# Patient Record
Sex: MALE | Race: WHITE | NOT HISPANIC OR LATINO | Employment: OTHER | URBAN - METROPOLITAN AREA
[De-identification: names, ages, dates, MRNs, and addresses within clinical notes are randomized per-mention and may not be internally consistent; named-entity substitution may affect disease eponyms.]

---

## 2017-06-07 ENCOUNTER — TRANSCRIBE ORDERS (OUTPATIENT)
Dept: ADMINISTRATIVE | Age: 67
End: 2017-06-07

## 2017-06-07 ENCOUNTER — APPOINTMENT (OUTPATIENT)
Dept: LAB | Age: 67
End: 2017-06-07
Payer: MEDICARE

## 2017-06-07 ENCOUNTER — ALLSCRIPTS OFFICE VISIT (OUTPATIENT)
Dept: OTHER | Facility: OTHER | Age: 67
End: 2017-06-07

## 2017-06-07 DIAGNOSIS — Z12.5 ENCOUNTER FOR SCREENING FOR MALIGNANT NEOPLASM OF PROSTATE: ICD-10-CM

## 2017-06-07 DIAGNOSIS — N39.0 URINARY TRACT INFECTION: ICD-10-CM

## 2017-06-07 LAB
BILIRUB UR QL STRIP: NEGATIVE
CLARITY UR: CLEAR
COLOR UR: YELLOW
GLUCOSE UR STRIP-MCNC: ABNORMAL MG/DL
HGB UR QL STRIP.AUTO: NEGATIVE
KETONES UR STRIP-MCNC: NEGATIVE MG/DL
LEUKOCYTE ESTERASE UR QL STRIP: NEGATIVE
NITRITE UR QL STRIP: NEGATIVE
PH UR STRIP.AUTO: 6 [PH] (ref 4.5–8)
PROT UR STRIP-MCNC: NEGATIVE MG/DL
PSA SERPL-MCNC: 0.4 NG/ML (ref 0–4)
SP GR UR STRIP.AUTO: 1.03 (ref 1–1.03)
UROBILINOGEN UR QL STRIP.AUTO: 0.2 E.U./DL

## 2017-06-07 PROCEDURE — 81003 URINALYSIS AUTO W/O SCOPE: CPT

## 2017-06-07 PROCEDURE — G0103 PSA SCREENING: HCPCS

## 2017-06-07 PROCEDURE — 36415 COLL VENOUS BLD VENIPUNCTURE: CPT

## 2017-06-07 PROCEDURE — 87086 URINE CULTURE/COLONY COUNT: CPT

## 2017-06-08 LAB — BACTERIA UR CULT: NORMAL

## 2017-06-20 ENCOUNTER — TRANSCRIBE ORDERS (OUTPATIENT)
Dept: ADMINISTRATIVE | Age: 67
End: 2017-06-20

## 2017-06-20 ENCOUNTER — APPOINTMENT (OUTPATIENT)
Dept: LAB | Age: 67
End: 2017-06-20
Payer: MEDICARE

## 2017-06-20 DIAGNOSIS — I10 ESSENTIAL HYPERTENSION, MALIGNANT: ICD-10-CM

## 2017-06-20 DIAGNOSIS — E08.8 DIABETES MELLITUS DUE TO UNDERLYING CONDITION WITH COMPLICATION, UNSPECIFIED LONG TERM INSULIN USE STATUS: ICD-10-CM

## 2017-06-20 DIAGNOSIS — R53.83 FATIGUE, UNSPECIFIED TYPE: Primary | ICD-10-CM

## 2017-06-20 DIAGNOSIS — R53.83 FATIGUE, UNSPECIFIED TYPE: ICD-10-CM

## 2017-06-20 LAB
ALBUMIN SERPL BCP-MCNC: 4.1 G/DL (ref 3.5–5)
ALP SERPL-CCNC: 57 U/L (ref 46–116)
ALT SERPL W P-5'-P-CCNC: 29 U/L (ref 12–78)
ANION GAP SERPL CALCULATED.3IONS-SCNC: 6 MMOL/L (ref 4–13)
AST SERPL W P-5'-P-CCNC: 21 U/L (ref 5–45)
BASOPHILS # BLD AUTO: 0.01 THOUSANDS/ΜL (ref 0–0.1)
BASOPHILS NFR BLD AUTO: 0 % (ref 0–1)
BILIRUB SERPL-MCNC: 0.66 MG/DL (ref 0.2–1)
BILIRUB UR QL STRIP: NEGATIVE
BUN SERPL-MCNC: 19 MG/DL (ref 5–25)
CALCIUM SERPL-MCNC: 9.2 MG/DL (ref 8.3–10.1)
CHLORIDE SERPL-SCNC: 109 MMOL/L (ref 100–108)
CHOLEST SERPL-MCNC: 138 MG/DL (ref 50–200)
CLARITY UR: CLEAR
CO2 SERPL-SCNC: 27 MMOL/L (ref 21–32)
COLOR UR: YELLOW
CREAT SERPL-MCNC: 1.25 MG/DL (ref 0.6–1.3)
CREAT UR-MCNC: 197 MG/DL
EOSINOPHIL # BLD AUTO: 0.17 THOUSAND/ΜL (ref 0–0.61)
EOSINOPHIL NFR BLD AUTO: 3 % (ref 0–6)
ERYTHROCYTE [DISTWIDTH] IN BLOOD BY AUTOMATED COUNT: 13 % (ref 11.6–15.1)
EST. AVERAGE GLUCOSE BLD GHB EST-MCNC: 120 MG/DL
GFR SERPL CREATININE-BSD FRML MDRD: 57.6 ML/MIN/1.73SQ M
GLUCOSE P FAST SERPL-MCNC: 133 MG/DL (ref 65–99)
GLUCOSE UR STRIP-MCNC: ABNORMAL MG/DL
HBA1C MFR BLD: 5.8 % (ref 4.2–6.3)
HCT VFR BLD AUTO: 43.4 % (ref 36.5–49.3)
HDLC SERPL-MCNC: 30 MG/DL (ref 40–60)
HGB BLD-MCNC: 15.2 G/DL (ref 12–17)
HGB UR QL STRIP.AUTO: NEGATIVE
KETONES UR STRIP-MCNC: NEGATIVE MG/DL
LDLC SERPL CALC-MCNC: 80 MG/DL (ref 0–100)
LEUKOCYTE ESTERASE UR QL STRIP: NEGATIVE
LYMPHOCYTES # BLD AUTO: 1.75 THOUSANDS/ΜL (ref 0.6–4.47)
LYMPHOCYTES NFR BLD AUTO: 30 % (ref 14–44)
MCH RBC QN AUTO: 34.4 PG (ref 26.8–34.3)
MCHC RBC AUTO-ENTMCNC: 35 G/DL (ref 31.4–37.4)
MCV RBC AUTO: 98 FL (ref 82–98)
MICROALBUMIN UR-MCNC: 17.4 MG/L (ref 0–20)
MICROALBUMIN/CREAT 24H UR: 9 MG/G CREATININE (ref 0–30)
MONOCYTES # BLD AUTO: 0.44 THOUSAND/ΜL (ref 0.17–1.22)
MONOCYTES NFR BLD AUTO: 8 % (ref 4–12)
NEUTROPHILS # BLD AUTO: 3.35 THOUSANDS/ΜL (ref 1.85–7.62)
NEUTS SEG NFR BLD AUTO: 59 % (ref 43–75)
NITRITE UR QL STRIP: NEGATIVE
NRBC BLD AUTO-RTO: 0 /100 WBCS
PH UR STRIP.AUTO: 6.5 [PH] (ref 4.5–8)
PLATELET # BLD AUTO: 199 THOUSANDS/UL (ref 149–390)
PMV BLD AUTO: 11.4 FL (ref 8.9–12.7)
POTASSIUM SERPL-SCNC: 4.4 MMOL/L (ref 3.5–5.3)
PROT SERPL-MCNC: 7.1 G/DL (ref 6.4–8.2)
PROT UR STRIP-MCNC: NEGATIVE MG/DL
RBC # BLD AUTO: 4.42 MILLION/UL (ref 3.88–5.62)
SODIUM SERPL-SCNC: 142 MMOL/L (ref 136–145)
SP GR UR STRIP.AUTO: 1.03 (ref 1–1.03)
T3 SERPL-MCNC: 0.8 NG/ML (ref 0.6–1.8)
T4 FREE SERPL-MCNC: 0.91 NG/DL (ref 0.76–1.46)
TRIGL SERPL-MCNC: 142 MG/DL
TSH SERPL DL<=0.05 MIU/L-ACNC: 2.9 UIU/ML (ref 0.36–3.74)
UROBILINOGEN UR QL STRIP.AUTO: 1 E.U./DL
WBC # BLD AUTO: 5.77 THOUSAND/UL (ref 4.31–10.16)

## 2017-06-20 PROCEDURE — 80053 COMPREHEN METABOLIC PANEL: CPT

## 2017-06-20 PROCEDURE — 82043 UR ALBUMIN QUANTITATIVE: CPT | Performed by: INTERNAL MEDICINE

## 2017-06-20 PROCEDURE — 84439 ASSAY OF FREE THYROXINE: CPT

## 2017-06-20 PROCEDURE — 83036 HEMOGLOBIN GLYCOSYLATED A1C: CPT

## 2017-06-20 PROCEDURE — 82570 ASSAY OF URINE CREATININE: CPT | Performed by: INTERNAL MEDICINE

## 2017-06-20 PROCEDURE — 36415 COLL VENOUS BLD VENIPUNCTURE: CPT

## 2017-06-20 PROCEDURE — 85025 COMPLETE CBC W/AUTO DIFF WBC: CPT

## 2017-06-20 PROCEDURE — 84443 ASSAY THYROID STIM HORMONE: CPT

## 2017-06-20 PROCEDURE — 80061 LIPID PANEL: CPT

## 2017-06-20 PROCEDURE — 81003 URINALYSIS AUTO W/O SCOPE: CPT | Performed by: INTERNAL MEDICINE

## 2017-06-20 PROCEDURE — 84480 ASSAY TRIIODOTHYRONINE (T3): CPT

## 2017-07-21 ENCOUNTER — TRANSCRIBE ORDERS (OUTPATIENT)
Dept: ADMINISTRATIVE | Age: 67
End: 2017-07-21

## 2017-07-21 ENCOUNTER — APPOINTMENT (OUTPATIENT)
Dept: LAB | Age: 67
End: 2017-07-21
Payer: MEDICARE

## 2017-07-21 DIAGNOSIS — A69.20 LYME DISEASE, UNSPECIFIED: Primary | ICD-10-CM

## 2017-07-21 DIAGNOSIS — A69.20 LYME DISEASE, UNSPECIFIED: ICD-10-CM

## 2017-07-21 PROCEDURE — 36415 COLL VENOUS BLD VENIPUNCTURE: CPT

## 2017-07-21 PROCEDURE — 87476 LYME DIS DNA AMP PROBE: CPT

## 2017-07-25 LAB — B BURGDOR DNA SPEC QL NAA+PROBE: NEGATIVE

## 2017-10-17 ENCOUNTER — TRANSCRIBE ORDERS (OUTPATIENT)
Dept: ADMINISTRATIVE | Age: 67
End: 2017-10-17

## 2017-10-17 ENCOUNTER — APPOINTMENT (OUTPATIENT)
Dept: LAB | Age: 67
End: 2017-10-17
Payer: MEDICARE

## 2017-10-17 DIAGNOSIS — E13.8 OTHER SPECIFIED DIABETES MELLITUS WITH COMPLICATION, WITHOUT LONG-TERM CURRENT USE OF INSULIN: ICD-10-CM

## 2017-10-17 DIAGNOSIS — E78.49 FAMILIAL COMBINED HYPERLIPIDEMIA: ICD-10-CM

## 2017-10-17 DIAGNOSIS — E13.8 OTHER SPECIFIED DIABETES MELLITUS WITH COMPLICATION, WITHOUT LONG-TERM CURRENT USE OF INSULIN: Primary | ICD-10-CM

## 2017-10-17 LAB
ALBUMIN SERPL BCP-MCNC: 3.8 G/DL (ref 3.5–5)
ALP SERPL-CCNC: 49 U/L (ref 46–116)
ALT SERPL W P-5'-P-CCNC: 31 U/L (ref 12–78)
ANION GAP SERPL CALCULATED.3IONS-SCNC: 7 MMOL/L (ref 4–13)
AST SERPL W P-5'-P-CCNC: 23 U/L (ref 5–45)
BILIRUB SERPL-MCNC: 0.72 MG/DL (ref 0.2–1)
BUN SERPL-MCNC: 23 MG/DL (ref 5–25)
CALCIUM SERPL-MCNC: 9.1 MG/DL (ref 8.3–10.1)
CHLORIDE SERPL-SCNC: 107 MMOL/L (ref 100–108)
CO2 SERPL-SCNC: 25 MMOL/L (ref 21–32)
CREAT SERPL-MCNC: 1.28 MG/DL (ref 0.6–1.3)
EST. AVERAGE GLUCOSE BLD GHB EST-MCNC: 140 MG/DL
GFR SERPL CREATININE-BSD FRML MDRD: 57 ML/MIN/1.73SQ M
GLUCOSE P FAST SERPL-MCNC: 147 MG/DL (ref 65–99)
HBA1C MFR BLD: 6.5 % (ref 4.2–6.3)
POTASSIUM SERPL-SCNC: 4.7 MMOL/L (ref 3.5–5.3)
PROT SERPL-MCNC: 7.3 G/DL (ref 6.4–8.2)
SODIUM SERPL-SCNC: 139 MMOL/L (ref 136–145)
T3 SERPL-MCNC: 0.9 NG/ML (ref 0.6–1.8)
T4 FREE SERPL-MCNC: 0.92 NG/DL (ref 0.76–1.46)
TSH SERPL DL<=0.05 MIU/L-ACNC: 2.55 UIU/ML (ref 0.36–3.74)
URATE SERPL-MCNC: 3.8 MG/DL (ref 4.2–8)

## 2017-10-17 PROCEDURE — 84439 ASSAY OF FREE THYROXINE: CPT

## 2017-10-17 PROCEDURE — 84550 ASSAY OF BLOOD/URIC ACID: CPT

## 2017-10-17 PROCEDURE — 84480 ASSAY TRIIODOTHYRONINE (T3): CPT

## 2017-10-17 PROCEDURE — 80053 COMPREHEN METABOLIC PANEL: CPT

## 2017-10-17 PROCEDURE — 36415 COLL VENOUS BLD VENIPUNCTURE: CPT

## 2017-10-17 PROCEDURE — 83036 HEMOGLOBIN GLYCOSYLATED A1C: CPT

## 2017-10-17 PROCEDURE — 84443 ASSAY THYROID STIM HORMONE: CPT

## 2018-01-14 VITALS
SYSTOLIC BLOOD PRESSURE: 122 MMHG | HEART RATE: 62 BPM | WEIGHT: 178 LBS | DIASTOLIC BLOOD PRESSURE: 68 MMHG | BODY MASS INDEX: 29.66 KG/M2 | HEIGHT: 65 IN

## 2018-02-21 ENCOUNTER — APPOINTMENT (OUTPATIENT)
Dept: LAB | Age: 68
End: 2018-02-21
Payer: MEDICARE

## 2018-02-21 ENCOUNTER — TRANSCRIBE ORDERS (OUTPATIENT)
Dept: ADMINISTRATIVE | Age: 68
End: 2018-02-21

## 2018-02-21 DIAGNOSIS — T73.2XXA FATIGUE DUE TO EXPOSURE, INITIAL ENCOUNTER: ICD-10-CM

## 2018-02-21 DIAGNOSIS — E78.49 FAMILIAL COMBINED HYPERLIPIDEMIA: ICD-10-CM

## 2018-02-21 DIAGNOSIS — E13.8 DIABETES MELLITUS OF OTHER TYPE WITH COMPLICATION, UNSPECIFIED LONG TERM INSULIN USE STATUS: ICD-10-CM

## 2018-02-21 DIAGNOSIS — E13.8 DIABETES MELLITUS OF OTHER TYPE WITH COMPLICATION, UNSPECIFIED LONG TERM INSULIN USE STATUS: Primary | ICD-10-CM

## 2018-02-21 LAB
ALBUMIN SERPL BCP-MCNC: 3.8 G/DL (ref 3.5–5)
ALP SERPL-CCNC: 43 U/L (ref 46–116)
ALT SERPL W P-5'-P-CCNC: 31 U/L (ref 12–78)
ANION GAP SERPL CALCULATED.3IONS-SCNC: 7 MMOL/L (ref 4–13)
AST SERPL W P-5'-P-CCNC: 25 U/L (ref 5–45)
BASOPHILS # BLD AUTO: 0.01 THOUSANDS/ΜL (ref 0–0.1)
BASOPHILS NFR BLD AUTO: 0 % (ref 0–1)
BILIRUB SERPL-MCNC: 0.81 MG/DL (ref 0.2–1)
BUN SERPL-MCNC: 19 MG/DL (ref 5–25)
CALCIUM SERPL-MCNC: 8.7 MG/DL (ref 8.3–10.1)
CHLORIDE SERPL-SCNC: 107 MMOL/L (ref 100–108)
CHOLEST SERPL-MCNC: 111 MG/DL (ref 50–200)
CO2 SERPL-SCNC: 27 MMOL/L (ref 21–32)
CREAT SERPL-MCNC: 1.32 MG/DL (ref 0.6–1.3)
EOSINOPHIL # BLD AUTO: 0.17 THOUSAND/ΜL (ref 0–0.61)
EOSINOPHIL NFR BLD AUTO: 3 % (ref 0–6)
ERYTHROCYTE [DISTWIDTH] IN BLOOD BY AUTOMATED COUNT: 12.5 % (ref 11.6–15.1)
EST. AVERAGE GLUCOSE BLD GHB EST-MCNC: 137 MG/DL
GFR SERPL CREATININE-BSD FRML MDRD: 55 ML/MIN/1.73SQ M
GLUCOSE P FAST SERPL-MCNC: 140 MG/DL (ref 65–99)
HBA1C MFR BLD: 6.4 % (ref 4.2–6.3)
HCT VFR BLD AUTO: 40.9 % (ref 36.5–49.3)
HDLC SERPL-MCNC: 28 MG/DL (ref 40–60)
HGB BLD-MCNC: 14.3 G/DL (ref 12–17)
LDLC SERPL CALC-MCNC: 55 MG/DL (ref 0–100)
LYMPHOCYTES # BLD AUTO: 1.53 THOUSANDS/ΜL (ref 0.6–4.47)
LYMPHOCYTES NFR BLD AUTO: 28 % (ref 14–44)
MCH RBC QN AUTO: 34.2 PG (ref 26.8–34.3)
MCHC RBC AUTO-ENTMCNC: 35 G/DL (ref 31.4–37.4)
MCV RBC AUTO: 98 FL (ref 82–98)
MONOCYTES # BLD AUTO: 0.45 THOUSAND/ΜL (ref 0.17–1.22)
MONOCYTES NFR BLD AUTO: 8 % (ref 4–12)
NEUTROPHILS # BLD AUTO: 3.16 THOUSANDS/ΜL (ref 1.85–7.62)
NEUTS SEG NFR BLD AUTO: 61 % (ref 43–75)
NRBC BLD AUTO-RTO: 0 /100 WBCS
PLATELET # BLD AUTO: 167 THOUSANDS/UL (ref 149–390)
PMV BLD AUTO: 11 FL (ref 8.9–12.7)
POTASSIUM SERPL-SCNC: 4.3 MMOL/L (ref 3.5–5.3)
PROT SERPL-MCNC: 6.7 G/DL (ref 6.4–8.2)
RBC # BLD AUTO: 4.18 MILLION/UL (ref 3.88–5.62)
SODIUM SERPL-SCNC: 141 MMOL/L (ref 136–145)
T3 SERPL-MCNC: 0.9 NG/ML (ref 0.6–1.8)
T4 FREE SERPL-MCNC: 0.87 NG/DL (ref 0.76–1.46)
TRIGL SERPL-MCNC: 138 MG/DL
TSH SERPL DL<=0.05 MIU/L-ACNC: 3.59 UIU/ML (ref 0.36–3.74)
WBC # BLD AUTO: 5.38 THOUSAND/UL (ref 4.31–10.16)

## 2018-02-21 PROCEDURE — 84480 ASSAY TRIIODOTHYRONINE (T3): CPT

## 2018-02-21 PROCEDURE — 80053 COMPREHEN METABOLIC PANEL: CPT

## 2018-02-21 PROCEDURE — 84439 ASSAY OF FREE THYROXINE: CPT

## 2018-02-21 PROCEDURE — 36415 COLL VENOUS BLD VENIPUNCTURE: CPT

## 2018-02-21 PROCEDURE — 84443 ASSAY THYROID STIM HORMONE: CPT

## 2018-02-21 PROCEDURE — 83036 HEMOGLOBIN GLYCOSYLATED A1C: CPT

## 2018-02-21 PROCEDURE — 85025 COMPLETE CBC W/AUTO DIFF WBC: CPT

## 2018-02-21 PROCEDURE — 80061 LIPID PANEL: CPT

## 2018-06-07 DIAGNOSIS — Z12.5 ENCOUNTER FOR SCREENING FOR MALIGNANT NEOPLASM OF PROSTATE: ICD-10-CM

## 2018-06-25 ENCOUNTER — TRANSCRIBE ORDERS (OUTPATIENT)
Dept: ADMINISTRATIVE | Age: 68
End: 2018-06-25

## 2018-06-25 ENCOUNTER — APPOINTMENT (OUTPATIENT)
Dept: LAB | Age: 68
End: 2018-06-25
Payer: MEDICARE

## 2018-06-25 DIAGNOSIS — R53.83 TIREDNESS: ICD-10-CM

## 2018-06-25 DIAGNOSIS — E11.9 DIABETES MELLITUS, STABLE (HCC): ICD-10-CM

## 2018-06-25 DIAGNOSIS — E11.9 DIABETES MELLITUS, STABLE (HCC): Primary | ICD-10-CM

## 2018-06-25 DIAGNOSIS — I10 HYPERTENSION, ESSENTIAL: ICD-10-CM

## 2018-06-25 LAB
ALBUMIN SERPL BCP-MCNC: 3.9 G/DL (ref 3.5–5)
ALP SERPL-CCNC: 48 U/L (ref 46–116)
ALT SERPL W P-5'-P-CCNC: 31 U/L (ref 12–78)
ANION GAP SERPL CALCULATED.3IONS-SCNC: 8 MMOL/L (ref 4–13)
AST SERPL W P-5'-P-CCNC: 25 U/L (ref 5–45)
BASOPHILS # BLD AUTO: 0.03 THOUSANDS/ΜL (ref 0–0.1)
BASOPHILS NFR BLD AUTO: 1 % (ref 0–1)
BILIRUB SERPL-MCNC: 0.66 MG/DL (ref 0.2–1)
BUN SERPL-MCNC: 20 MG/DL (ref 5–25)
CALCIUM SERPL-MCNC: 8.9 MG/DL (ref 8.3–10.1)
CHLORIDE SERPL-SCNC: 107 MMOL/L (ref 100–108)
CHOLEST SERPL-MCNC: 110 MG/DL (ref 50–200)
CO2 SERPL-SCNC: 25 MMOL/L (ref 21–32)
CREAT SERPL-MCNC: 1.22 MG/DL (ref 0.6–1.3)
CREAT UR-MCNC: 152 MG/DL
EOSINOPHIL # BLD AUTO: 0.16 THOUSAND/ΜL (ref 0–0.61)
EOSINOPHIL NFR BLD AUTO: 3 % (ref 0–6)
ERYTHROCYTE [DISTWIDTH] IN BLOOD BY AUTOMATED COUNT: 12 % (ref 11.6–15.1)
EST. AVERAGE GLUCOSE BLD GHB EST-MCNC: 134 MG/DL
GFR SERPL CREATININE-BSD FRML MDRD: 61 ML/MIN/1.73SQ M
GLUCOSE P FAST SERPL-MCNC: 148 MG/DL (ref 65–99)
HBA1C MFR BLD: 6.3 % (ref 4.2–6.3)
HCT VFR BLD AUTO: 41.8 % (ref 36.5–49.3)
HDLC SERPL-MCNC: 29 MG/DL (ref 40–60)
HGB BLD-MCNC: 14.5 G/DL (ref 12–17)
IMM GRANULOCYTES # BLD AUTO: 0.05 THOUSAND/UL (ref 0–0.2)
IMM GRANULOCYTES NFR BLD AUTO: 1 % (ref 0–2)
LDLC SERPL CALC-MCNC: 47 MG/DL (ref 0–100)
LDLC SERPL DIRECT ASSAY-MCNC: 69 MG/DL (ref 0–100)
LYMPHOCYTES # BLD AUTO: 1.43 THOUSANDS/ΜL (ref 0.6–4.47)
LYMPHOCYTES NFR BLD AUTO: 30 % (ref 14–44)
MCH RBC QN AUTO: 34.3 PG (ref 26.8–34.3)
MCHC RBC AUTO-ENTMCNC: 34.7 G/DL (ref 31.4–37.4)
MCV RBC AUTO: 99 FL (ref 82–98)
MICROALBUMIN UR-MCNC: 11.4 MG/L (ref 0–20)
MICROALBUMIN/CREAT 24H UR: 8 MG/G CREATININE (ref 0–30)
MONOCYTES # BLD AUTO: 0.4 THOUSAND/ΜL (ref 0.17–1.22)
MONOCYTES NFR BLD AUTO: 9 % (ref 4–12)
NEUTROPHILS # BLD AUTO: 2.63 THOUSANDS/ΜL (ref 1.85–7.62)
NEUTS SEG NFR BLD AUTO: 56 % (ref 43–75)
NONHDLC SERPL-MCNC: 81 MG/DL
NRBC BLD AUTO-RTO: 0 /100 WBCS
PLATELET # BLD AUTO: 169 THOUSANDS/UL (ref 149–390)
PMV BLD AUTO: 11.3 FL (ref 8.9–12.7)
POTASSIUM SERPL-SCNC: 3.9 MMOL/L (ref 3.5–5.3)
PROT SERPL-MCNC: 6.9 G/DL (ref 6.4–8.2)
RBC # BLD AUTO: 4.23 MILLION/UL (ref 3.88–5.62)
SODIUM SERPL-SCNC: 140 MMOL/L (ref 136–145)
T3 SERPL-MCNC: 0.9 NG/ML (ref 0.6–1.8)
T4 FREE SERPL-MCNC: 0.89 NG/DL (ref 0.76–1.46)
T4 SERPL-MCNC: 7.5 UG/DL (ref 4.7–13.3)
TRIGL SERPL-MCNC: 169 MG/DL
TSH SERPL DL<=0.05 MIU/L-ACNC: 2.83 UIU/ML (ref 0.36–3.74)
WBC # BLD AUTO: 4.7 THOUSAND/UL (ref 4.31–10.16)

## 2018-06-25 PROCEDURE — 84439 ASSAY OF FREE THYROXINE: CPT

## 2018-06-25 PROCEDURE — 83036 HEMOGLOBIN GLYCOSYLATED A1C: CPT

## 2018-06-25 PROCEDURE — 80061 LIPID PANEL: CPT

## 2018-06-25 PROCEDURE — 85025 COMPLETE CBC W/AUTO DIFF WBC: CPT

## 2018-06-25 PROCEDURE — 82570 ASSAY OF URINE CREATININE: CPT | Performed by: INTERNAL MEDICINE

## 2018-06-25 PROCEDURE — 80053 COMPREHEN METABOLIC PANEL: CPT

## 2018-06-25 PROCEDURE — 84443 ASSAY THYROID STIM HORMONE: CPT

## 2018-06-25 PROCEDURE — 82043 UR ALBUMIN QUANTITATIVE: CPT | Performed by: INTERNAL MEDICINE

## 2018-06-25 PROCEDURE — 36415 COLL VENOUS BLD VENIPUNCTURE: CPT

## 2018-06-25 PROCEDURE — 84480 ASSAY TRIIODOTHYRONINE (T3): CPT

## 2019-01-08 ENCOUNTER — TRANSCRIBE ORDERS (OUTPATIENT)
Dept: ADMINISTRATIVE | Facility: HOSPITAL | Age: 69
End: 2019-01-08

## 2019-01-08 DIAGNOSIS — G47.30 SLEEP APNEA, UNSPECIFIED TYPE: Primary | ICD-10-CM

## 2019-02-05 ENCOUNTER — OFFICE VISIT (OUTPATIENT)
Dept: SLEEP CENTER | Facility: CLINIC | Age: 69
End: 2019-02-05
Payer: MEDICARE

## 2019-02-05 VITALS
SYSTOLIC BLOOD PRESSURE: 120 MMHG | DIASTOLIC BLOOD PRESSURE: 80 MMHG | HEIGHT: 66 IN | HEART RATE: 56 BPM | WEIGHT: 184 LBS | BODY MASS INDEX: 29.57 KG/M2

## 2019-02-05 DIAGNOSIS — G47.30 SLEEP APNEA, UNSPECIFIED TYPE: ICD-10-CM

## 2019-02-05 PROCEDURE — 99204 OFFICE O/P NEW MOD 45 MIN: CPT | Performed by: INTERNAL MEDICINE

## 2019-02-05 RX ORDER — LISINOPRIL 5 MG/1
10 TABLET ORAL DAILY
COMMUNITY

## 2019-02-05 RX ORDER — METOPROLOL TARTRATE 50 MG/1
25 TABLET, FILM COATED ORAL EVERY 12 HOURS SCHEDULED
COMMUNITY

## 2019-02-05 RX ORDER — ATORVASTATIN CALCIUM 40 MG/1
40 TABLET, FILM COATED ORAL DAILY
COMMUNITY
End: 2022-05-13 | Stop reason: ALTCHOICE

## 2019-02-05 RX ORDER — RANOLAZINE 500 MG/1
500 TABLET, EXTENDED RELEASE ORAL 2 TIMES DAILY
COMMUNITY

## 2019-02-05 RX ORDER — FENOFIBRIC ACID 135 MG/1
CAPSULE, DELAYED RELEASE ORAL
COMMUNITY

## 2019-02-05 RX ORDER — GLIMEPIRIDE 2 MG/1
TABLET ORAL
Refills: 0 | COMMUNITY
Start: 2019-01-07

## 2019-02-05 RX ORDER — DEXLANSOPRAZOLE 60 MG/1
60 CAPSULE, DELAYED RELEASE ORAL DAILY
COMMUNITY

## 2019-02-05 NOTE — PROGRESS NOTES
Consultation - [address] Eisenberg : 1950  MRN: [id number]      Assessment:  The patient had testing performed on a SNAP device, which is inaccurate with regards to diagnosing sleep apnea and in particular identifying central sleep apnea  The patient was placed on CPAP of 7 cm, based on a nonvalidated calculation  It would be helpful to identify whether not the patient has central events and to identify the severity of his sleep apnea  At that point, the patient could be placed on either AutoPAP with a range of 4 to 20 cm or return for a titration study  The patient does have a reduction in ejection fraction due to a prior myocardial infarction (52%)  Plan:  Diagnostic polysomnography to determine the presence and severity of sleep apnea  I spoke with Jina Iglesias from [facility], who will provide the patient with an [facility], which I hope will not leak  Follow up: After testing    History of Present Illness:   70 y o male with a history of myocardial infarction and hypertension who underwent home sleep test (SNAP), which reported moderate obstructive sleep apnea with frequent central events  The patient also had overnight oximetry which showed frequent saw tooth desaturation  CPAP was calculated at a pressure of 7cm  The patient awakens with shortness of breath  He is having difficulty tolerating his CPAP, both because the pressure feels uncomfortable and the mask leaks  He has a history of asthma, but denies sustained shortness of breath at night  He also has a history of gastroesophageal reflux for which he is on Dexilant  His asthma is only problematic when he has an upper respiratory infection  He complains of difficulty breathing through his nose  He attributes that to a deviated septum        Review of Systems:      Genitourinary none   Cardiology none   Gastrointestinal frequent heartburn/acid reflux   Neurology need to move extremities, forgetfulness, poor concentration or confusion,  and difficulty with memory   Constitutional claustrophobia and fatigue   Integumentary none   Psychiatry none   Musculoskeletal joint pain   Pulmonary shortness of breath with activity and wheezing   ENT ringing in ears   Endocrine none   Hematological none         I have reviewed and updated the review of systems as necessary    Historical Information    Past Medical History:  Hypertension, gastroesophageal reflux, asthma, diabetes, hypercholesterolemia    Family History: Numerous family members have obstructive sleep apnea or snoring      Sleep Schedule: unremarkable    Snoring:    Yes      Witnessed Apnea:    No    Medications/Allergies:    Current Outpatient Prescriptions:     ASPIRIN 81 PO, Take by mouth, Disp: , Rfl:     atorvastatin (LIPITOR) 40 mg tablet, Take 40 mg by mouth daily, Disp: , Rfl:     Choline Fenofibrate (FENOFIBRIC ACID) 135 MG CPDR, Take by mouth, Disp: , Rfl:     dexlansoprazole (DEXILANT) 60 MG capsule, Take 60 mg by mouth daily, Disp: , Rfl:     glimepiride (AMARYL) 2 mg tablet, , Disp: , Rfl: 0    lisinopril (ZESTRIL) 5 mg tablet, Take 5 mg by mouth daily, Disp: , Rfl:     metoprolol tartrate (LOPRESSOR) 50 mg tablet, Take 25 mg by mouth every 12 (twelve) hours, Disp: , Rfl:     ranolazine (RANEXA) 500 mg 12 hr tablet, Take 500 mg by mouth 2 (two) times a day, Disp: , Rfl:     SITagliptin-MetFORMIN HCl ER (JANUMET XR) 100-1000 MG TB24, Take 1 tablet by mouth daily with dinner, Disp: , Rfl:         No notes on file                  Objective:    Vital Signs:   Vitals:    02/05/19 1200   BP: 120/80   Pulse: 56   Weight: 83 5 kg (184 lb)   Height: 5' 5 5" (1 664 m)     Neck Circumference: 40      [place] Sleepiness Scale:  Total score: 15    Physical Exam:    General: Alert, appropriate, cooperative, slightly overweight    Head: NC/AT, no retrognathia    Nose:  Moderate septal deviation, nares partially obstructed, mucosa normal    Throat: Airway lumen narrowed, tongue base thickened, no tonsils visualized    Neck: Normal, no thyromegaly or lymphadenopathy, no JVD    Heart: RR, normal S1 and S2, 2+ murmur left upper sternal border    Chest: Clear bilaterally    Extremity: No clubbing, cyanosis, trace edema    Skin: Warm, dry    Neuro: No motor abnormalities, cranial nerves appear intact    Sleep Study Results:   Await results of polysomnography  PAP Pressure: Currently set at 7 cm  DME Provider:  [address] [place] [address] / Coordination of Care  A description of the counseling / coordination of care: We discussed the pathophysiology of obstructive sleep apnea as well as the possible treatment options  We also discussed the rationale for positive airway pressure therapy              Board Certified Sleep Specialist

## 2019-02-06 ENCOUNTER — TRANSCRIBE ORDERS (OUTPATIENT)
Dept: ADMINISTRATIVE | Age: 69
End: 2019-02-06

## 2019-02-06 ENCOUNTER — APPOINTMENT (OUTPATIENT)
Dept: LAB | Age: 69
End: 2019-02-06
Payer: MEDICARE

## 2019-02-06 DIAGNOSIS — E13.8 DIABETES MELLITUS OF OTHER TYPE WITH COMPLICATION, UNSPECIFIED WHETHER LONG TERM INSULIN USE: ICD-10-CM

## 2019-02-06 DIAGNOSIS — R35.0 URINARY FREQUENCY: Primary | ICD-10-CM

## 2019-02-06 DIAGNOSIS — I10 ESSENTIAL HYPERTENSION, MALIGNANT: ICD-10-CM

## 2019-02-06 DIAGNOSIS — E78.41 ELEVATED LIPOPROTEIN A LEVEL: ICD-10-CM

## 2019-02-06 LAB
ALBUMIN SERPL BCP-MCNC: 3.9 G/DL (ref 3.5–5)
ALP SERPL-CCNC: 52 U/L (ref 46–116)
ALT SERPL W P-5'-P-CCNC: 29 U/L (ref 12–78)
ANION GAP SERPL CALCULATED.3IONS-SCNC: 6 MMOL/L (ref 4–13)
AST SERPL W P-5'-P-CCNC: 25 U/L (ref 5–45)
BACTERIA UR QL AUTO: ABNORMAL /HPF
BASOPHILS # BLD AUTO: 0.03 THOUSANDS/ΜL (ref 0–0.1)
BASOPHILS NFR BLD AUTO: 1 % (ref 0–1)
BILIRUB SERPL-MCNC: 0.74 MG/DL (ref 0.2–1)
BILIRUB UR QL STRIP: ABNORMAL
BUN SERPL-MCNC: 21 MG/DL (ref 5–25)
CALCIUM SERPL-MCNC: 8.9 MG/DL (ref 8.3–10.1)
CHLORIDE SERPL-SCNC: 109 MMOL/L (ref 100–108)
CHOLEST SERPL-MCNC: 109 MG/DL (ref 50–200)
CLARITY UR: CLEAR
CO2 SERPL-SCNC: 27 MMOL/L (ref 21–32)
COLOR UR: ABNORMAL
CREAT SERPL-MCNC: 1.3 MG/DL (ref 0.6–1.3)
CREAT UR-MCNC: 221 MG/DL
EOSINOPHIL # BLD AUTO: 0.16 THOUSAND/ΜL (ref 0–0.61)
EOSINOPHIL NFR BLD AUTO: 3 % (ref 0–6)
ERYTHROCYTE [DISTWIDTH] IN BLOOD BY AUTOMATED COUNT: 12.2 % (ref 11.6–15.1)
EST. AVERAGE GLUCOSE BLD GHB EST-MCNC: 143 MG/DL
GFR SERPL CREATININE-BSD FRML MDRD: 56 ML/MIN/1.73SQ M
GLUCOSE P FAST SERPL-MCNC: 139 MG/DL (ref 65–99)
GLUCOSE UR STRIP-MCNC: ABNORMAL MG/DL
HBA1C MFR BLD: 6.6 % (ref 4.2–6.3)
HCT VFR BLD AUTO: 43.1 % (ref 36.5–49.3)
HDLC SERPL-MCNC: 28 MG/DL (ref 40–60)
HGB BLD-MCNC: 14.6 G/DL (ref 12–17)
HGB UR QL STRIP.AUTO: NEGATIVE
HYALINE CASTS #/AREA URNS LPF: ABNORMAL /LPF
IMM GRANULOCYTES # BLD AUTO: 0.04 THOUSAND/UL (ref 0–0.2)
IMM GRANULOCYTES NFR BLD AUTO: 1 % (ref 0–2)
KETONES UR STRIP-MCNC: NEGATIVE MG/DL
LDLC SERPL CALC-MCNC: 51 MG/DL (ref 0–100)
LEUKOCYTE ESTERASE UR QL STRIP: ABNORMAL
LYMPHOCYTES # BLD AUTO: 1.54 THOUSANDS/ΜL (ref 0.6–4.47)
LYMPHOCYTES NFR BLD AUTO: 30 % (ref 14–44)
MCH RBC QN AUTO: 33.6 PG (ref 26.8–34.3)
MCHC RBC AUTO-ENTMCNC: 33.9 G/DL (ref 31.4–37.4)
MCV RBC AUTO: 99 FL (ref 82–98)
MICROALBUMIN UR-MCNC: 24.4 MG/L (ref 0–20)
MICROALBUMIN/CREAT 24H UR: 11 MG/G CREATININE (ref 0–30)
MONOCYTES # BLD AUTO: 0.45 THOUSAND/ΜL (ref 0.17–1.22)
MONOCYTES NFR BLD AUTO: 9 % (ref 4–12)
NEUTROPHILS # BLD AUTO: 2.89 THOUSANDS/ΜL (ref 1.85–7.62)
NEUTS SEG NFR BLD AUTO: 56 % (ref 43–75)
NITRITE UR QL STRIP: NEGATIVE
NON-SQ EPI CELLS URNS QL MICRO: ABNORMAL /HPF
NONHDLC SERPL-MCNC: 81 MG/DL
NRBC BLD AUTO-RTO: 0 /100 WBCS
PH UR STRIP.AUTO: 6 [PH] (ref 4.5–8)
PLATELET # BLD AUTO: 166 THOUSANDS/UL (ref 149–390)
PMV BLD AUTO: 11 FL (ref 8.9–12.7)
POTASSIUM SERPL-SCNC: 4.4 MMOL/L (ref 3.5–5.3)
PROT SERPL-MCNC: 6.9 G/DL (ref 6.4–8.2)
PROT UR STRIP-MCNC: ABNORMAL MG/DL
RBC # BLD AUTO: 4.34 MILLION/UL (ref 3.88–5.62)
RBC #/AREA URNS AUTO: ABNORMAL /HPF
SODIUM SERPL-SCNC: 142 MMOL/L (ref 136–145)
SP GR UR STRIP.AUTO: 1.03 (ref 1–1.03)
T3 SERPL-MCNC: 0.7 NG/ML (ref 0.6–1.8)
T4 FREE SERPL-MCNC: 0.93 NG/DL (ref 0.76–1.46)
TRIGL SERPL-MCNC: 148 MG/DL
TSH SERPL DL<=0.05 MIU/L-ACNC: 3.8 UIU/ML (ref 0.36–3.74)
UROBILINOGEN UR QL STRIP.AUTO: 1 E.U./DL
WBC # BLD AUTO: 5.11 THOUSAND/UL (ref 4.31–10.16)
WBC #/AREA URNS AUTO: ABNORMAL /HPF

## 2019-02-06 PROCEDURE — 85025 COMPLETE CBC W/AUTO DIFF WBC: CPT

## 2019-02-06 PROCEDURE — 82043 UR ALBUMIN QUANTITATIVE: CPT | Performed by: INTERNAL MEDICINE

## 2019-02-06 PROCEDURE — 84439 ASSAY OF FREE THYROXINE: CPT

## 2019-02-06 PROCEDURE — 83036 HEMOGLOBIN GLYCOSYLATED A1C: CPT

## 2019-02-06 PROCEDURE — 82570 ASSAY OF URINE CREATININE: CPT | Performed by: INTERNAL MEDICINE

## 2019-02-06 PROCEDURE — 80061 LIPID PANEL: CPT

## 2019-02-06 PROCEDURE — 84443 ASSAY THYROID STIM HORMONE: CPT

## 2019-02-06 PROCEDURE — 81001 URINALYSIS AUTO W/SCOPE: CPT | Performed by: INTERNAL MEDICINE

## 2019-02-06 PROCEDURE — 80053 COMPREHEN METABOLIC PANEL: CPT

## 2019-02-06 PROCEDURE — 84480 ASSAY TRIIODOTHYRONINE (T3): CPT

## 2019-02-06 PROCEDURE — 36415 COLL VENOUS BLD VENIPUNCTURE: CPT

## 2019-02-20 ENCOUNTER — APPOINTMENT (OUTPATIENT)
Dept: LAB | Age: 69
End: 2019-02-20
Payer: MEDICARE

## 2019-02-20 DIAGNOSIS — R35.0 URINARY FREQUENCY: ICD-10-CM

## 2019-02-20 LAB
BILIRUB UR QL STRIP: NEGATIVE
CLARITY UR: CLEAR
COLOR UR: YELLOW
GLUCOSE UR STRIP-MCNC: ABNORMAL MG/DL
HGB UR QL STRIP.AUTO: NEGATIVE
KETONES UR STRIP-MCNC: NEGATIVE MG/DL
LEUKOCYTE ESTERASE UR QL STRIP: NEGATIVE
NITRITE UR QL STRIP: NEGATIVE
PH UR STRIP.AUTO: 6.5 [PH] (ref 4.5–8)
PROT UR STRIP-MCNC: NEGATIVE MG/DL
SP GR UR STRIP.AUTO: 1.03 (ref 1–1.03)
UROBILINOGEN UR QL STRIP.AUTO: 1 E.U./DL

## 2019-02-20 PROCEDURE — 81003 URINALYSIS AUTO W/O SCOPE: CPT | Performed by: INTERNAL MEDICINE

## 2019-02-20 PROCEDURE — 87086 URINE CULTURE/COLONY COUNT: CPT

## 2019-02-21 LAB — BACTERIA UR CULT: NORMAL

## 2019-03-13 ENCOUNTER — HOSPITAL ENCOUNTER (OUTPATIENT)
Dept: SLEEP CENTER | Facility: CLINIC | Age: 69
Discharge: HOME | End: 2019-03-13
Payer: MEDICARE

## 2019-03-13 DIAGNOSIS — G47.30 SLEEP APNEA, UNSPECIFIED TYPE: ICD-10-CM

## 2019-03-13 PROCEDURE — 95810 POLYSOM 6/> YRS 4/> PARAM: CPT

## 2019-03-14 DIAGNOSIS — G47.33 OSA (OBSTRUCTIVE SLEEP APNEA): Primary | ICD-10-CM

## 2019-03-14 NOTE — PROGRESS NOTES
Sleep Study Documentation    Pre-Sleep Study       Sleep testing procedure explained to patient:YES    Patient napped prior to study:YES- less than 30 minutes  Napped after 2PM: yes    Caffeine:Dayshift worker after 12PM   Caffeine use:NO    Alcohol:Dayshift workers after 5PM: Alcohol use:NO    Typical day for patient:YES       Study Documentation    Sleep Study Indications: Previously diagnosed MOHIT, hypertension     Diagnostic   Snore:Mild  Supplemental O2: no    Minimum SaO2 80%  Baseline SaO2 96%      EKG abnormalities: yes:  EPOCH example and comments: ARTEMIO's - 272, 530     EEG abnormalities: no    Sleep Study Recorded < 2 hours: N/A    Sleep Study Recorded > 2 hours but incomplete study: N/A    Sleep Study Recorded 6 hours but no sleep obtained: NO    Patient classification: retired       Post-Sleep Study    Medication used at bedtime or during sleep study:NO    Patient reports time it took to fall asleep:greater than 60 minutes    Patient reports waking up during study:3 or more times  Patient reports returning to sleep in greater than 30 minutes  Patient reports sleeping 2 to 4 hours without dreaming  Patient reports sleep during study:worse than usual    Patient rated sleepiness: Somewhat sleepy or tired    PAP treatment:no

## 2019-03-18 ENCOUNTER — TELEPHONE (OUTPATIENT)
Dept: SLEEP CENTER | Facility: CLINIC | Age: 69
End: 2019-03-18

## 2019-03-18 NOTE — TELEPHONE ENCOUNTER
Spoke with patient regarding mild obstructive sleep apnea on study and APAP  He currently had a CPAP thru [facility] that is renting  Rx and data faxed for pressure change  Compliance follow up scheduled 5/8

## 2019-05-08 ENCOUNTER — OFFICE VISIT (OUTPATIENT)
Dept: SLEEP CENTER | Facility: CLINIC | Age: 69
End: 2019-05-08
Payer: MEDICARE

## 2019-05-08 VITALS
BODY MASS INDEX: 30.16 KG/M2 | HEART RATE: 60 BPM | SYSTOLIC BLOOD PRESSURE: 122 MMHG | DIASTOLIC BLOOD PRESSURE: 78 MMHG | WEIGHT: 181 LBS | HEIGHT: 65 IN

## 2019-05-08 DIAGNOSIS — G47.33 OSA (OBSTRUCTIVE SLEEP APNEA): Primary | ICD-10-CM

## 2019-05-08 PROCEDURE — 99213 OFFICE O/P EST LOW 20 MIN: CPT | Performed by: INTERNAL MEDICINE

## 2019-05-22 ENCOUNTER — APPOINTMENT (OUTPATIENT)
Dept: LAB | Age: 69
End: 2019-05-22
Payer: MEDICARE

## 2019-05-22 ENCOUNTER — TRANSCRIBE ORDERS (OUTPATIENT)
Dept: ADMINISTRATIVE | Age: 69
End: 2019-05-22

## 2019-05-22 DIAGNOSIS — E78.41 ELEVATED LIPOPROTEIN A LEVEL: Primary | ICD-10-CM

## 2019-05-22 DIAGNOSIS — E78.41 ELEVATED LIPOPROTEIN A LEVEL: ICD-10-CM

## 2019-05-22 DIAGNOSIS — E13.8 DIABETES MELLITUS OF OTHER TYPE WITH COMPLICATION, UNSPECIFIED WHETHER LONG TERM INSULIN USE: ICD-10-CM

## 2019-05-22 LAB
ALBUMIN SERPL BCP-MCNC: 3.8 G/DL (ref 3.5–5)
ALP SERPL-CCNC: 41 U/L (ref 46–116)
ALT SERPL W P-5'-P-CCNC: 24 U/L (ref 12–78)
ANION GAP SERPL CALCULATED.3IONS-SCNC: 5 MMOL/L (ref 4–13)
AST SERPL W P-5'-P-CCNC: 22 U/L (ref 5–45)
BILIRUB SERPL-MCNC: 0.87 MG/DL (ref 0.2–1)
BUN SERPL-MCNC: 20 MG/DL (ref 5–25)
CALCIUM SERPL-MCNC: 8.4 MG/DL (ref 8.3–10.1)
CHLORIDE SERPL-SCNC: 108 MMOL/L (ref 100–108)
CO2 SERPL-SCNC: 27 MMOL/L (ref 21–32)
CREAT SERPL-MCNC: 1.38 MG/DL (ref 0.6–1.3)
EST. AVERAGE GLUCOSE BLD GHB EST-MCNC: 134 MG/DL
GFR SERPL CREATININE-BSD FRML MDRD: 52 ML/MIN/1.73SQ M
GLUCOSE P FAST SERPL-MCNC: 92 MG/DL (ref 65–99)
HBA1C MFR BLD: 6.3 % (ref 4.2–6.3)
POTASSIUM SERPL-SCNC: 4 MMOL/L (ref 3.5–5.3)
PROT SERPL-MCNC: 6.9 G/DL (ref 6.4–8.2)
SODIUM SERPL-SCNC: 140 MMOL/L (ref 136–145)
T3 SERPL-MCNC: 0.8 NG/ML (ref 0.6–1.8)
T4 FREE SERPL-MCNC: 0.85 NG/DL (ref 0.76–1.46)
TSH SERPL DL<=0.05 MIU/L-ACNC: 3.58 UIU/ML (ref 0.36–3.74)

## 2019-05-22 PROCEDURE — 84480 ASSAY TRIIODOTHYRONINE (T3): CPT

## 2019-05-22 PROCEDURE — 36415 COLL VENOUS BLD VENIPUNCTURE: CPT

## 2019-05-22 PROCEDURE — 84443 ASSAY THYROID STIM HORMONE: CPT

## 2019-05-22 PROCEDURE — 83036 HEMOGLOBIN GLYCOSYLATED A1C: CPT

## 2019-05-22 PROCEDURE — 80053 COMPREHEN METABOLIC PANEL: CPT

## 2019-05-22 PROCEDURE — 84439 ASSAY OF FREE THYROXINE: CPT

## 2019-10-04 ENCOUNTER — TRANSCRIBE ORDERS (OUTPATIENT)
Dept: ADMINISTRATIVE | Age: 69
End: 2019-10-04

## 2019-10-04 ENCOUNTER — APPOINTMENT (OUTPATIENT)
Dept: LAB | Age: 69
End: 2019-10-04
Payer: MEDICARE

## 2019-10-04 DIAGNOSIS — R35.0 URINARY FREQUENCY: ICD-10-CM

## 2019-10-04 DIAGNOSIS — R35.0 URINARY FREQUENCY: Primary | ICD-10-CM

## 2019-10-04 DIAGNOSIS — E08.00 DIABETES MELLITUS DUE TO UNDERLYING CONDITION WITH HYPEROSMOLARITY WITHOUT COMA, UNSPECIFIED WHETHER LONG TERM INSULIN USE (HCC): ICD-10-CM

## 2019-10-04 LAB
ALBUMIN SERPL BCP-MCNC: 3.7 G/DL (ref 3.5–5)
ALP SERPL-CCNC: 44 U/L (ref 46–116)
ALT SERPL W P-5'-P-CCNC: 27 U/L (ref 12–78)
ANION GAP SERPL CALCULATED.3IONS-SCNC: 9 MMOL/L (ref 4–13)
AST SERPL W P-5'-P-CCNC: 25 U/L (ref 5–45)
BILIRUB SERPL-MCNC: 0.75 MG/DL (ref 0.2–1)
BUN SERPL-MCNC: 18 MG/DL (ref 5–25)
CALCIUM SERPL-MCNC: 9.3 MG/DL (ref 8.3–10.1)
CHLORIDE SERPL-SCNC: 109 MMOL/L (ref 100–108)
CO2 SERPL-SCNC: 25 MMOL/L (ref 21–32)
CREAT SERPL-MCNC: 1.25 MG/DL (ref 0.6–1.3)
EST. AVERAGE GLUCOSE BLD GHB EST-MCNC: 137 MG/DL
GFR SERPL CREATININE-BSD FRML MDRD: 58 ML/MIN/1.73SQ M
GLUCOSE P FAST SERPL-MCNC: 105 MG/DL (ref 65–99)
HBA1C MFR BLD: 6.4 % (ref 4.2–6.3)
POTASSIUM SERPL-SCNC: 4.1 MMOL/L (ref 3.5–5.3)
PROT SERPL-MCNC: 6.9 G/DL (ref 6.4–8.2)
PSA SERPL-MCNC: 0.6 NG/ML (ref 0–4)
SODIUM SERPL-SCNC: 143 MMOL/L (ref 136–145)

## 2019-10-04 PROCEDURE — 84153 ASSAY OF PSA TOTAL: CPT

## 2019-10-04 PROCEDURE — 80053 COMPREHEN METABOLIC PANEL: CPT

## 2019-10-04 PROCEDURE — 36415 COLL VENOUS BLD VENIPUNCTURE: CPT

## 2019-10-04 PROCEDURE — 83036 HEMOGLOBIN GLYCOSYLATED A1C: CPT

## 2019-10-23 ENCOUNTER — APPOINTMENT (OUTPATIENT)
Dept: LAB | Age: 69
End: 2019-10-23
Payer: MEDICARE

## 2019-10-23 ENCOUNTER — TRANSCRIBE ORDERS (OUTPATIENT)
Dept: ADMINISTRATIVE | Age: 69
End: 2019-10-23

## 2019-10-23 DIAGNOSIS — I25.118 CORONARY ARTERY DISEASE INVOLVING NATIVE CORONARY ARTERY OF NATIVE HEART WITH OTHER FORM OF ANGINA PECTORIS (HCC): ICD-10-CM

## 2019-10-23 DIAGNOSIS — E78.2 MIXED HYPERLIPIDEMIA: ICD-10-CM

## 2019-10-23 DIAGNOSIS — Z95.5 HISTORY OF HEART ARTERY STENT: ICD-10-CM

## 2019-10-23 DIAGNOSIS — I25.118 CORONARY ARTERY DISEASE INVOLVING NATIVE CORONARY ARTERY OF NATIVE HEART WITH OTHER FORM OF ANGINA PECTORIS (HCC): Primary | ICD-10-CM

## 2019-10-23 LAB
CHOLEST SERPL-MCNC: 128 MG/DL (ref 50–200)
HDLC SERPL-MCNC: 27 MG/DL
LDLC SERPL CALC-MCNC: 71 MG/DL (ref 0–100)
NONHDLC SERPL-MCNC: 101 MG/DL
TRIGL SERPL-MCNC: 148 MG/DL

## 2019-10-23 PROCEDURE — 36415 COLL VENOUS BLD VENIPUNCTURE: CPT

## 2019-10-23 PROCEDURE — 80061 LIPID PANEL: CPT

## 2020-02-04 ENCOUNTER — APPOINTMENT (OUTPATIENT)
Dept: LAB | Age: 70
End: 2020-02-04
Payer: MEDICARE

## 2020-02-04 ENCOUNTER — TRANSCRIBE ORDERS (OUTPATIENT)
Dept: ADMINISTRATIVE | Age: 70
End: 2020-02-04

## 2020-02-04 DIAGNOSIS — Z79.01 LONG TERM (CURRENT) USE OF ANTICOAGULANTS: ICD-10-CM

## 2020-02-04 DIAGNOSIS — E13.69 OTHER SPECIFIED DIABETES MELLITUS WITH OTHER SPECIFIED COMPLICATION, UNSPECIFIED WHETHER LONG TERM INSULIN USE (HCC): ICD-10-CM

## 2020-02-04 DIAGNOSIS — Z12.5 SPECIAL SCREENING FOR MALIGNANT NEOPLASM OF PROSTATE: ICD-10-CM

## 2020-02-04 DIAGNOSIS — E78.41 ELEVATED LIPOPROTEIN A LEVEL: ICD-10-CM

## 2020-02-04 DIAGNOSIS — Z79.01 LONG TERM (CURRENT) USE OF ANTICOAGULANTS: Primary | ICD-10-CM

## 2020-02-04 DIAGNOSIS — I10 ESSENTIAL HYPERTENSION, MALIGNANT: ICD-10-CM

## 2020-02-04 LAB
ALBUMIN SERPL BCP-MCNC: 3.7 G/DL (ref 3.5–5)
ALP SERPL-CCNC: 43 U/L (ref 46–116)
ALT SERPL W P-5'-P-CCNC: 34 U/L (ref 12–78)
ANION GAP SERPL CALCULATED.3IONS-SCNC: 4 MMOL/L (ref 4–13)
AST SERPL W P-5'-P-CCNC: 28 U/L (ref 5–45)
BASOPHILS # BLD AUTO: 0.04 THOUSANDS/ΜL (ref 0–0.1)
BASOPHILS NFR BLD AUTO: 1 % (ref 0–1)
BILIRUB SERPL-MCNC: 0.87 MG/DL (ref 0.2–1)
BUN SERPL-MCNC: 19 MG/DL (ref 5–25)
CALCIUM SERPL-MCNC: 9 MG/DL (ref 8.3–10.1)
CHLORIDE SERPL-SCNC: 110 MMOL/L (ref 100–108)
CHOLEST SERPL-MCNC: 121 MG/DL (ref 50–200)
CO2 SERPL-SCNC: 24 MMOL/L (ref 21–32)
CREAT SERPL-MCNC: 1.22 MG/DL (ref 0.6–1.3)
EOSINOPHIL # BLD AUTO: 0.13 THOUSAND/ΜL (ref 0–0.61)
EOSINOPHIL NFR BLD AUTO: 2 % (ref 0–6)
ERYTHROCYTE [DISTWIDTH] IN BLOOD BY AUTOMATED COUNT: 12.2 % (ref 11.6–15.1)
GFR SERPL CREATININE-BSD FRML MDRD: 60 ML/MIN/1.73SQ M
GLUCOSE P FAST SERPL-MCNC: 169 MG/DL (ref 65–99)
HCT VFR BLD AUTO: 42.1 % (ref 36.5–49.3)
HDLC SERPL-MCNC: 27 MG/DL
HGB BLD-MCNC: 14.2 G/DL (ref 12–17)
IMM GRANULOCYTES # BLD AUTO: 0.06 THOUSAND/UL (ref 0–0.2)
IMM GRANULOCYTES NFR BLD AUTO: 1 % (ref 0–2)
LDLC SERPL CALC-MCNC: 61 MG/DL (ref 0–100)
LYMPHOCYTES # BLD AUTO: 1.49 THOUSANDS/ΜL (ref 0.6–4.47)
LYMPHOCYTES NFR BLD AUTO: 28 % (ref 14–44)
MCH RBC QN AUTO: 33.6 PG (ref 26.8–34.3)
MCHC RBC AUTO-ENTMCNC: 33.7 G/DL (ref 31.4–37.4)
MCV RBC AUTO: 100 FL (ref 82–98)
MONOCYTES # BLD AUTO: 0.4 THOUSAND/ΜL (ref 0.17–1.22)
MONOCYTES NFR BLD AUTO: 8 % (ref 4–12)
NEUTROPHILS # BLD AUTO: 3.21 THOUSANDS/ΜL (ref 1.85–7.62)
NEUTS SEG NFR BLD AUTO: 60 % (ref 43–75)
NONHDLC SERPL-MCNC: 94 MG/DL
NRBC BLD AUTO-RTO: 0 /100 WBCS
PLATELET # BLD AUTO: 170 THOUSANDS/UL (ref 149–390)
PMV BLD AUTO: 11.8 FL (ref 8.9–12.7)
POTASSIUM SERPL-SCNC: 4.3 MMOL/L (ref 3.5–5.3)
PROT SERPL-MCNC: 6.9 G/DL (ref 6.4–8.2)
RBC # BLD AUTO: 4.22 MILLION/UL (ref 3.88–5.62)
SODIUM SERPL-SCNC: 138 MMOL/L (ref 136–145)
T3 SERPL-MCNC: 1 NG/ML (ref 0.6–1.8)
TRIGL SERPL-MCNC: 167 MG/DL
TSH SERPL DL<=0.05 MIU/L-ACNC: 2.57 UIU/ML (ref 0.36–3.74)
WBC # BLD AUTO: 5.33 THOUSAND/UL (ref 4.31–10.16)

## 2020-02-04 PROCEDURE — 80061 LIPID PANEL: CPT

## 2020-02-04 PROCEDURE — 84480 ASSAY TRIIODOTHYRONINE (T3): CPT

## 2020-02-04 PROCEDURE — 80053 COMPREHEN METABOLIC PANEL: CPT

## 2020-02-04 PROCEDURE — 36415 COLL VENOUS BLD VENIPUNCTURE: CPT

## 2020-02-04 PROCEDURE — 84443 ASSAY THYROID STIM HORMONE: CPT

## 2020-02-04 PROCEDURE — 85025 COMPLETE CBC W/AUTO DIFF WBC: CPT

## 2020-02-11 ENCOUNTER — TRANSCRIBE ORDERS (OUTPATIENT)
Dept: ADMINISTRATIVE | Age: 70
End: 2020-02-11

## 2020-02-11 ENCOUNTER — TRANSCRIBE ORDERS (OUTPATIENT)
Dept: LAB | Facility: HOSPITAL | Age: 70
End: 2020-02-11

## 2020-02-11 ENCOUNTER — APPOINTMENT (OUTPATIENT)
Dept: LAB | Age: 70
End: 2020-02-11
Payer: MEDICARE

## 2020-02-11 DIAGNOSIS — E13.69 OTHER SPECIFIED DIABETES MELLITUS WITH OTHER SPECIFIED COMPLICATION, UNSPECIFIED WHETHER LONG TERM INSULIN USE (HCC): ICD-10-CM

## 2020-02-11 DIAGNOSIS — E11.9 TYPE 2 DIABETES MELLITUS WITHOUT COMPLICATION, WITHOUT LONG-TERM CURRENT USE OF INSULIN (HCC): Primary | ICD-10-CM

## 2020-02-11 DIAGNOSIS — E11.9 TYPE 2 DIABETES MELLITUS WITHOUT COMPLICATION, WITHOUT LONG-TERM CURRENT USE OF INSULIN (HCC): ICD-10-CM

## 2020-02-11 DIAGNOSIS — E13.69 OTHER SPECIFIED DIABETES MELLITUS WITH OTHER SPECIFIED COMPLICATION, UNSPECIFIED WHETHER LONG TERM INSULIN USE (HCC): Primary | ICD-10-CM

## 2020-02-11 LAB
EST. AVERAGE GLUCOSE BLD GHB EST-MCNC: 148 MG/DL
HBA1C MFR BLD: 6.8 %

## 2020-02-11 PROCEDURE — 36415 COLL VENOUS BLD VENIPUNCTURE: CPT

## 2020-02-11 PROCEDURE — 83036 HEMOGLOBIN GLYCOSYLATED A1C: CPT

## 2020-05-12 ENCOUNTER — TELEMEDICINE (OUTPATIENT)
Dept: SLEEP CENTER | Facility: CLINIC | Age: 70
End: 2020-05-12
Payer: MEDICARE

## 2020-05-12 DIAGNOSIS — G47.33 OSA (OBSTRUCTIVE SLEEP APNEA): Primary | ICD-10-CM

## 2020-05-12 PROCEDURE — 99441 PR PHYS/QHP TELEPHONE EVALUATION 5-10 MIN: CPT | Performed by: INTERNAL MEDICINE

## 2020-05-13 ENCOUNTER — TELEPHONE (OUTPATIENT)
Dept: SLEEP CENTER | Facility: CLINIC | Age: 70
End: 2020-05-13

## 2020-06-12 ENCOUNTER — APPOINTMENT (OUTPATIENT)
Dept: LAB | Age: 70
End: 2020-06-12
Payer: MEDICARE

## 2020-06-12 ENCOUNTER — TRANSCRIBE ORDERS (OUTPATIENT)
Dept: ADMINISTRATIVE | Age: 70
End: 2020-06-12

## 2020-06-12 DIAGNOSIS — F32.A FATIGUE DUE TO DEPRESSION: ICD-10-CM

## 2020-06-12 DIAGNOSIS — R53.83 FATIGUE DUE TO DEPRESSION: ICD-10-CM

## 2020-06-12 DIAGNOSIS — R05.9 COUGH: ICD-10-CM

## 2020-06-12 DIAGNOSIS — E08.00 DIABETES MELLITUS DUE TO UNDERLYING CONDITION WITH HYPEROSMOLARITY WITHOUT COMA, WITHOUT LONG-TERM CURRENT USE OF INSULIN (HCC): Primary | ICD-10-CM

## 2020-06-12 DIAGNOSIS — E08.00 DIABETES MELLITUS DUE TO UNDERLYING CONDITION WITH HYPEROSMOLARITY WITHOUT COMA, WITHOUT LONG-TERM CURRENT USE OF INSULIN (HCC): ICD-10-CM

## 2020-06-12 LAB
ALBUMIN SERPL BCP-MCNC: 3.9 G/DL (ref 3.5–5)
ALP SERPL-CCNC: 61 U/L (ref 46–116)
ALT SERPL W P-5'-P-CCNC: 31 U/L (ref 12–78)
ANION GAP SERPL CALCULATED.3IONS-SCNC: 4 MMOL/L (ref 4–13)
AST SERPL W P-5'-P-CCNC: 21 U/L (ref 5–45)
BASOPHILS # BLD AUTO: 0.02 THOUSANDS/ΜL (ref 0–0.1)
BASOPHILS NFR BLD AUTO: 1 % (ref 0–1)
BILIRUB SERPL-MCNC: 0.58 MG/DL (ref 0.2–1)
BUN SERPL-MCNC: 25 MG/DL (ref 5–25)
CALCIUM SERPL-MCNC: 9.3 MG/DL (ref 8.3–10.1)
CHLORIDE SERPL-SCNC: 109 MMOL/L (ref 100–108)
CO2 SERPL-SCNC: 25 MMOL/L (ref 21–32)
CREAT SERPL-MCNC: 1.33 MG/DL (ref 0.6–1.3)
EOSINOPHIL # BLD AUTO: 0.12 THOUSAND/ΜL (ref 0–0.61)
EOSINOPHIL NFR BLD AUTO: 3 % (ref 0–6)
ERYTHROCYTE [DISTWIDTH] IN BLOOD BY AUTOMATED COUNT: 12.5 % (ref 11.6–15.1)
EST. AVERAGE GLUCOSE BLD GHB EST-MCNC: 166 MG/DL
GFR SERPL CREATININE-BSD FRML MDRD: 54 ML/MIN/1.73SQ M
GLUCOSE P FAST SERPL-MCNC: 152 MG/DL (ref 65–99)
HBA1C MFR BLD: 7.4 %
HCT VFR BLD AUTO: 38.5 % (ref 36.5–49.3)
HGB BLD-MCNC: 13.4 G/DL (ref 12–17)
IMM GRANULOCYTES # BLD AUTO: 0.06 THOUSAND/UL (ref 0–0.2)
IMM GRANULOCYTES NFR BLD AUTO: 1 % (ref 0–2)
LYMPHOCYTES # BLD AUTO: 1.32 THOUSANDS/ΜL (ref 0.6–4.47)
LYMPHOCYTES NFR BLD AUTO: 30 % (ref 14–44)
MCH RBC QN AUTO: 34.4 PG (ref 26.8–34.3)
MCHC RBC AUTO-ENTMCNC: 34.8 G/DL (ref 31.4–37.4)
MCV RBC AUTO: 99 FL (ref 82–98)
MONOCYTES # BLD AUTO: 0.36 THOUSAND/ΜL (ref 0.17–1.22)
MONOCYTES NFR BLD AUTO: 8 % (ref 4–12)
NEUTROPHILS # BLD AUTO: 2.5 THOUSANDS/ΜL (ref 1.85–7.62)
NEUTS SEG NFR BLD AUTO: 57 % (ref 43–75)
NRBC BLD AUTO-RTO: 0 /100 WBCS
PLATELET # BLD AUTO: 171 THOUSANDS/UL (ref 149–390)
PMV BLD AUTO: 11 FL (ref 8.9–12.7)
POTASSIUM SERPL-SCNC: 4.4 MMOL/L (ref 3.5–5.3)
PROT SERPL-MCNC: 6.8 G/DL (ref 6.4–8.2)
RBC # BLD AUTO: 3.9 MILLION/UL (ref 3.88–5.62)
SODIUM SERPL-SCNC: 138 MMOL/L (ref 136–145)
WBC # BLD AUTO: 4.38 THOUSAND/UL (ref 4.31–10.16)

## 2020-06-12 PROCEDURE — 83036 HEMOGLOBIN GLYCOSYLATED A1C: CPT

## 2020-06-12 PROCEDURE — 85025 COMPLETE CBC W/AUTO DIFF WBC: CPT

## 2020-06-12 PROCEDURE — 36415 COLL VENOUS BLD VENIPUNCTURE: CPT

## 2020-06-12 PROCEDURE — 80053 COMPREHEN METABOLIC PANEL: CPT

## 2020-06-22 ENCOUNTER — APPOINTMENT (OUTPATIENT)
Dept: LAB | Age: 70
End: 2020-06-22
Payer: MEDICARE

## 2020-06-22 DIAGNOSIS — R05.9 COUGH: ICD-10-CM

## 2020-06-22 PROCEDURE — 86769 SARS-COV-2 COVID-19 ANTIBODY: CPT

## 2020-06-22 PROCEDURE — 36415 COLL VENOUS BLD VENIPUNCTURE: CPT

## 2020-06-24 LAB — SARS-COV-2 IGG SERPL QL IA: NEGATIVE

## 2020-08-31 DIAGNOSIS — J45.20 INTERMITTENT ASTHMA WITHOUT COMPLICATION, UNSPECIFIED ASTHMA SEVERITY: Primary | ICD-10-CM

## 2020-09-16 ENCOUNTER — TRANSCRIBE ORDERS (OUTPATIENT)
Dept: ADMINISTRATIVE | Age: 70
End: 2020-09-16

## 2020-09-16 ENCOUNTER — APPOINTMENT (OUTPATIENT)
Dept: LAB | Age: 70
End: 2020-09-16
Payer: MEDICARE

## 2020-09-16 DIAGNOSIS — E78.41 ELEVATED LIPOPROTEIN A LEVEL: ICD-10-CM

## 2020-09-16 DIAGNOSIS — I10 ESSENTIAL HYPERTENSION, MALIGNANT: ICD-10-CM

## 2020-09-16 DIAGNOSIS — E11.9 TYPE 2 DIABETES MELLITUS WITHOUT COMPLICATION, WITHOUT LONG-TERM CURRENT USE OF INSULIN (HCC): Primary | ICD-10-CM

## 2020-09-16 DIAGNOSIS — E11.9 TYPE 2 DIABETES MELLITUS WITHOUT COMPLICATION, WITHOUT LONG-TERM CURRENT USE OF INSULIN (HCC): ICD-10-CM

## 2020-09-16 LAB
ALBUMIN SERPL BCP-MCNC: 3.8 G/DL (ref 3.5–5)
ALP SERPL-CCNC: 45 U/L (ref 46–116)
ALT SERPL W P-5'-P-CCNC: 30 U/L (ref 12–78)
ANION GAP SERPL CALCULATED.3IONS-SCNC: 7 MMOL/L (ref 4–13)
AST SERPL W P-5'-P-CCNC: 26 U/L (ref 5–45)
BASOPHILS # BLD AUTO: 0.04 THOUSANDS/ΜL (ref 0–0.1)
BASOPHILS NFR BLD AUTO: 1 % (ref 0–1)
BILIRUB SERPL-MCNC: 0.83 MG/DL (ref 0.2–1)
BUN SERPL-MCNC: 19 MG/DL (ref 5–25)
CALCIUM SERPL-MCNC: 8.9 MG/DL (ref 8.3–10.1)
CHLORIDE SERPL-SCNC: 110 MMOL/L (ref 100–108)
CHOLEST SERPL-MCNC: 122 MG/DL (ref 50–200)
CO2 SERPL-SCNC: 24 MMOL/L (ref 21–32)
CREAT SERPL-MCNC: 1.25 MG/DL (ref 0.6–1.3)
CREAT UR-MCNC: 102 MG/DL
EOSINOPHIL # BLD AUTO: 0.16 THOUSAND/ΜL (ref 0–0.61)
EOSINOPHIL NFR BLD AUTO: 3 % (ref 0–6)
ERYTHROCYTE [DISTWIDTH] IN BLOOD BY AUTOMATED COUNT: 12 % (ref 11.6–15.1)
EST. AVERAGE GLUCOSE BLD GHB EST-MCNC: 143 MG/DL
GFR SERPL CREATININE-BSD FRML MDRD: 58 ML/MIN/1.73SQ M
GLUCOSE P FAST SERPL-MCNC: 124 MG/DL (ref 65–99)
HBA1C MFR BLD: 6.6 %
HCT VFR BLD AUTO: 41.1 % (ref 36.5–49.3)
HDLC SERPL-MCNC: 27 MG/DL
HGB BLD-MCNC: 14.4 G/DL (ref 12–17)
IMM GRANULOCYTES # BLD AUTO: 0.07 THOUSAND/UL (ref 0–0.2)
IMM GRANULOCYTES NFR BLD AUTO: 1 % (ref 0–2)
LDLC SERPL CALC-MCNC: 57 MG/DL (ref 0–100)
LYMPHOCYTES # BLD AUTO: 1.52 THOUSANDS/ΜL (ref 0.6–4.47)
LYMPHOCYTES NFR BLD AUTO: 29 % (ref 14–44)
MCH RBC QN AUTO: 35 PG (ref 26.8–34.3)
MCHC RBC AUTO-ENTMCNC: 35 G/DL (ref 31.4–37.4)
MCV RBC AUTO: 100 FL (ref 82–98)
MICROALBUMIN UR-MCNC: 8.4 MG/L (ref 0–20)
MICROALBUMIN/CREAT 24H UR: 8 MG/G CREATININE (ref 0–30)
MONOCYTES # BLD AUTO: 0.45 THOUSAND/ΜL (ref 0.17–1.22)
MONOCYTES NFR BLD AUTO: 9 % (ref 4–12)
NEUTROPHILS # BLD AUTO: 2.95 THOUSANDS/ΜL (ref 1.85–7.62)
NEUTS SEG NFR BLD AUTO: 57 % (ref 43–75)
NONHDLC SERPL-MCNC: 95 MG/DL
NRBC BLD AUTO-RTO: 0 /100 WBCS
PLATELET # BLD AUTO: 190 THOUSANDS/UL (ref 149–390)
PMV BLD AUTO: 10.8 FL (ref 8.9–12.7)
POTASSIUM SERPL-SCNC: 4.2 MMOL/L (ref 3.5–5.3)
PROT SERPL-MCNC: 6.9 G/DL (ref 6.4–8.2)
RBC # BLD AUTO: 4.12 MILLION/UL (ref 3.88–5.62)
SODIUM SERPL-SCNC: 141 MMOL/L (ref 136–145)
T3 SERPL-MCNC: 1 NG/ML (ref 0.6–1.8)
T4 FREE SERPL-MCNC: 0.91 NG/DL (ref 0.76–1.46)
TRIGL SERPL-MCNC: 192 MG/DL
TSH SERPL DL<=0.05 MIU/L-ACNC: 4.96 UIU/ML (ref 0.36–3.74)
WBC # BLD AUTO: 5.19 THOUSAND/UL (ref 4.31–10.16)

## 2020-09-16 PROCEDURE — 82570 ASSAY OF URINE CREATININE: CPT | Performed by: INTERNAL MEDICINE

## 2020-09-16 PROCEDURE — 80053 COMPREHEN METABOLIC PANEL: CPT

## 2020-09-16 PROCEDURE — 84439 ASSAY OF FREE THYROXINE: CPT

## 2020-09-16 PROCEDURE — 82043 UR ALBUMIN QUANTITATIVE: CPT | Performed by: INTERNAL MEDICINE

## 2020-09-16 PROCEDURE — 80061 LIPID PANEL: CPT

## 2020-09-16 PROCEDURE — 36415 COLL VENOUS BLD VENIPUNCTURE: CPT

## 2020-09-16 PROCEDURE — 84443 ASSAY THYROID STIM HORMONE: CPT

## 2020-09-16 PROCEDURE — 84480 ASSAY TRIIODOTHYRONINE (T3): CPT

## 2020-09-16 PROCEDURE — 85025 COMPLETE CBC W/AUTO DIFF WBC: CPT

## 2020-09-16 PROCEDURE — 83036 HEMOGLOBIN GLYCOSYLATED A1C: CPT

## 2021-01-06 ENCOUNTER — TRANSCRIBE ORDERS (OUTPATIENT)
Dept: ADMINISTRATIVE | Age: 71
End: 2021-01-06

## 2021-01-06 ENCOUNTER — LAB (OUTPATIENT)
Dept: LAB | Age: 71
End: 2021-01-06
Payer: MEDICARE

## 2021-01-06 DIAGNOSIS — E11.9 TYPE 2 DIABETES MELLITUS WITHOUT COMPLICATION, WITHOUT LONG-TERM CURRENT USE OF INSULIN (HCC): ICD-10-CM

## 2021-01-06 DIAGNOSIS — I10 ESSENTIAL HYPERTENSION, MALIGNANT: ICD-10-CM

## 2021-01-06 DIAGNOSIS — I10 ESSENTIAL HYPERTENSION, MALIGNANT: Primary | ICD-10-CM

## 2021-01-06 LAB
ALBUMIN SERPL BCP-MCNC: 4 G/DL (ref 3.5–5)
ALP SERPL-CCNC: 64 U/L (ref 46–116)
ALT SERPL W P-5'-P-CCNC: 42 U/L (ref 12–78)
ANION GAP SERPL CALCULATED.3IONS-SCNC: 1 MMOL/L (ref 4–13)
AST SERPL W P-5'-P-CCNC: 30 U/L (ref 5–45)
BASOPHILS # BLD AUTO: 0.03 THOUSANDS/ΜL (ref 0–0.1)
BASOPHILS NFR BLD AUTO: 1 % (ref 0–1)
BILIRUB SERPL-MCNC: 0.78 MG/DL (ref 0.2–1)
BUN SERPL-MCNC: 18 MG/DL (ref 5–25)
CALCIUM SERPL-MCNC: 9.7 MG/DL (ref 8.3–10.1)
CHLORIDE SERPL-SCNC: 110 MMOL/L (ref 100–108)
CO2 SERPL-SCNC: 30 MMOL/L (ref 21–32)
CREAT SERPL-MCNC: 1.3 MG/DL (ref 0.6–1.3)
EOSINOPHIL # BLD AUTO: 0.18 THOUSAND/ΜL (ref 0–0.61)
EOSINOPHIL NFR BLD AUTO: 4 % (ref 0–6)
ERYTHROCYTE [DISTWIDTH] IN BLOOD BY AUTOMATED COUNT: 12.2 % (ref 11.6–15.1)
EST. AVERAGE GLUCOSE BLD GHB EST-MCNC: 154 MG/DL
GFR SERPL CREATININE-BSD FRML MDRD: 55 ML/MIN/1.73SQ M
GLUCOSE P FAST SERPL-MCNC: 152 MG/DL (ref 65–99)
HBA1C MFR BLD: 7 %
HCT VFR BLD AUTO: 42.8 % (ref 36.5–49.3)
HGB BLD-MCNC: 14.8 G/DL (ref 12–17)
IMM GRANULOCYTES # BLD AUTO: 0.06 THOUSAND/UL (ref 0–0.2)
IMM GRANULOCYTES NFR BLD AUTO: 1 % (ref 0–2)
LYMPHOCYTES # BLD AUTO: 1.53 THOUSANDS/ΜL (ref 0.6–4.47)
LYMPHOCYTES NFR BLD AUTO: 30 % (ref 14–44)
MCH RBC QN AUTO: 34.1 PG (ref 26.8–34.3)
MCHC RBC AUTO-ENTMCNC: 34.6 G/DL (ref 31.4–37.4)
MCV RBC AUTO: 99 FL (ref 82–98)
MONOCYTES # BLD AUTO: 0.4 THOUSAND/ΜL (ref 0.17–1.22)
MONOCYTES NFR BLD AUTO: 8 % (ref 4–12)
NEUTROPHILS # BLD AUTO: 2.86 THOUSANDS/ΜL (ref 1.85–7.62)
NEUTS SEG NFR BLD AUTO: 56 % (ref 43–75)
NRBC BLD AUTO-RTO: 0 /100 WBCS
PLATELET # BLD AUTO: 175 THOUSANDS/UL (ref 149–390)
PMV BLD AUTO: 11.2 FL (ref 8.9–12.7)
POTASSIUM SERPL-SCNC: 4.1 MMOL/L (ref 3.5–5.3)
PROT SERPL-MCNC: 7.2 G/DL (ref 6.4–8.2)
PSA SERPL-MCNC: 0.5 NG/ML (ref 0–4)
RBC # BLD AUTO: 4.34 MILLION/UL (ref 3.88–5.62)
SODIUM SERPL-SCNC: 141 MMOL/L (ref 136–145)
T3 SERPL-MCNC: 0.7 NG/ML (ref 0.6–1.8)
T4 FREE SERPL-MCNC: 0.97 NG/DL (ref 0.76–1.46)
TSH SERPL DL<=0.05 MIU/L-ACNC: 5.86 UIU/ML (ref 0.36–3.74)
WBC # BLD AUTO: 5.06 THOUSAND/UL (ref 4.31–10.16)

## 2021-01-06 PROCEDURE — 84439 ASSAY OF FREE THYROXINE: CPT

## 2021-01-06 PROCEDURE — 83036 HEMOGLOBIN GLYCOSYLATED A1C: CPT

## 2021-01-06 PROCEDURE — 80053 COMPREHEN METABOLIC PANEL: CPT

## 2021-01-06 PROCEDURE — 84153 ASSAY OF PSA TOTAL: CPT

## 2021-01-06 PROCEDURE — 84443 ASSAY THYROID STIM HORMONE: CPT

## 2021-01-06 PROCEDURE — 36415 COLL VENOUS BLD VENIPUNCTURE: CPT

## 2021-01-06 PROCEDURE — 84480 ASSAY TRIIODOTHYRONINE (T3): CPT

## 2021-01-06 PROCEDURE — 85025 COMPLETE CBC W/AUTO DIFF WBC: CPT

## 2021-01-17 ENCOUNTER — IMMUNIZATIONS (OUTPATIENT)
Dept: FAMILY MEDICINE CLINIC | Facility: HOSPITAL | Age: 71
End: 2021-01-17

## 2021-01-17 DIAGNOSIS — Z23 ENCOUNTER FOR IMMUNIZATION: Primary | ICD-10-CM

## 2021-01-17 PROCEDURE — 0001A SARS-COV-2 / COVID-19 MRNA VACCINE (PFIZER-BIONTECH) 30 MCG: CPT

## 2021-01-17 PROCEDURE — 91300 SARS-COV-2 / COVID-19 MRNA VACCINE (PFIZER-BIONTECH) 30 MCG: CPT

## 2021-02-05 ENCOUNTER — IMMUNIZATIONS (OUTPATIENT)
Dept: FAMILY MEDICINE CLINIC | Facility: HOSPITAL | Age: 71
End: 2021-02-05

## 2021-02-05 DIAGNOSIS — Z23 ENCOUNTER FOR IMMUNIZATION: Primary | ICD-10-CM

## 2021-02-05 PROCEDURE — 91300 SARS-COV-2 / COVID-19 MRNA VACCINE (PFIZER-BIONTECH) 30 MCG: CPT

## 2021-02-05 PROCEDURE — 0002A SARS-COV-2 / COVID-19 MRNA VACCINE (PFIZER-BIONTECH) 30 MCG: CPT

## 2021-05-05 ENCOUNTER — TRANSCRIBE ORDERS (OUTPATIENT)
Dept: ADMINISTRATIVE | Age: 71
End: 2021-05-05

## 2021-05-05 ENCOUNTER — APPOINTMENT (OUTPATIENT)
Dept: LAB | Age: 71
End: 2021-05-05
Payer: MEDICARE

## 2021-05-05 DIAGNOSIS — R53.83 FATIGUE, UNSPECIFIED TYPE: ICD-10-CM

## 2021-05-05 DIAGNOSIS — E13.69 OTHER SPECIFIED DIABETES MELLITUS WITH OTHER SPECIFIED COMPLICATION, UNSPECIFIED WHETHER LONG TERM INSULIN USE (HCC): Primary | ICD-10-CM

## 2021-05-05 DIAGNOSIS — E78.5 HYPERLIPIDEMIA, UNSPECIFIED HYPERLIPIDEMIA TYPE: ICD-10-CM

## 2021-05-05 DIAGNOSIS — I10 ESSENTIAL HYPERTENSION, MALIGNANT: ICD-10-CM

## 2021-05-05 DIAGNOSIS — E13.69 OTHER SPECIFIED DIABETES MELLITUS WITH OTHER SPECIFIED COMPLICATION, UNSPECIFIED WHETHER LONG TERM INSULIN USE (HCC): ICD-10-CM

## 2021-05-05 LAB
ALBUMIN SERPL BCP-MCNC: 3.8 G/DL (ref 3.5–5)
ALP SERPL-CCNC: 48 U/L (ref 46–116)
ALT SERPL W P-5'-P-CCNC: 31 U/L (ref 12–78)
ANION GAP SERPL CALCULATED.3IONS-SCNC: 5 MMOL/L (ref 4–13)
AST SERPL W P-5'-P-CCNC: 27 U/L (ref 5–45)
BASOPHILS # BLD AUTO: 0.03 THOUSANDS/ΜL (ref 0–0.1)
BASOPHILS NFR BLD AUTO: 1 % (ref 0–1)
BILIRUB SERPL-MCNC: 0.7 MG/DL (ref 0.2–1)
BUN SERPL-MCNC: 21 MG/DL (ref 5–25)
CALCIUM SERPL-MCNC: 9.7 MG/DL (ref 8.3–10.1)
CHLORIDE SERPL-SCNC: 108 MMOL/L (ref 100–108)
CHOLEST SERPL-MCNC: 106 MG/DL (ref 50–200)
CO2 SERPL-SCNC: 28 MMOL/L (ref 21–32)
CREAT SERPL-MCNC: 1.26 MG/DL (ref 0.6–1.3)
CREAT UR-MCNC: 185 MG/DL
EOSINOPHIL # BLD AUTO: 0.12 THOUSAND/ΜL (ref 0–0.61)
EOSINOPHIL NFR BLD AUTO: 3 % (ref 0–6)
ERYTHROCYTE [DISTWIDTH] IN BLOOD BY AUTOMATED COUNT: 12.3 % (ref 11.6–15.1)
EST. AVERAGE GLUCOSE BLD GHB EST-MCNC: 137 MG/DL
GFR SERPL CREATININE-BSD FRML MDRD: 57 ML/MIN/1.73SQ M
GLUCOSE P FAST SERPL-MCNC: 127 MG/DL (ref 65–99)
HBA1C MFR BLD: 6.4 %
HCT VFR BLD AUTO: 40.8 % (ref 36.5–49.3)
HDLC SERPL-MCNC: 27 MG/DL
HGB BLD-MCNC: 14 G/DL (ref 12–17)
IMM GRANULOCYTES # BLD AUTO: 0.05 THOUSAND/UL (ref 0–0.2)
IMM GRANULOCYTES NFR BLD AUTO: 1 % (ref 0–2)
LDLC SERPL CALC-MCNC: 48 MG/DL (ref 0–100)
LYMPHOCYTES # BLD AUTO: 1.54 THOUSANDS/ΜL (ref 0.6–4.47)
LYMPHOCYTES NFR BLD AUTO: 34 % (ref 14–44)
MCH RBC QN AUTO: 34.3 PG (ref 26.8–34.3)
MCHC RBC AUTO-ENTMCNC: 34.3 G/DL (ref 31.4–37.4)
MCV RBC AUTO: 100 FL (ref 82–98)
MICROALBUMIN UR-MCNC: 14.9 MG/L (ref 0–20)
MICROALBUMIN/CREAT 24H UR: 8 MG/G CREATININE (ref 0–30)
MONOCYTES # BLD AUTO: 0.45 THOUSAND/ΜL (ref 0.17–1.22)
MONOCYTES NFR BLD AUTO: 10 % (ref 4–12)
NEUTROPHILS # BLD AUTO: 2.4 THOUSANDS/ΜL (ref 1.85–7.62)
NEUTS SEG NFR BLD AUTO: 51 % (ref 43–75)
NONHDLC SERPL-MCNC: 79 MG/DL
NRBC BLD AUTO-RTO: 0 /100 WBCS
PLATELET # BLD AUTO: 173 THOUSANDS/UL (ref 149–390)
PMV BLD AUTO: 11.3 FL (ref 8.9–12.7)
POTASSIUM SERPL-SCNC: 4.3 MMOL/L (ref 3.5–5.3)
PROT SERPL-MCNC: 6.7 G/DL (ref 6.4–8.2)
RBC # BLD AUTO: 4.08 MILLION/UL (ref 3.88–5.62)
SODIUM SERPL-SCNC: 141 MMOL/L (ref 136–145)
T3 SERPL-MCNC: 1 NG/ML (ref 0.6–1.8)
T4 FREE SERPL-MCNC: 0.91 NG/DL (ref 0.76–1.46)
TRIGL SERPL-MCNC: 155 MG/DL
TSH SERPL DL<=0.05 MIU/L-ACNC: 3.71 UIU/ML (ref 0.36–3.74)
WBC # BLD AUTO: 4.59 THOUSAND/UL (ref 4.31–10.16)

## 2021-05-05 PROCEDURE — 36415 COLL VENOUS BLD VENIPUNCTURE: CPT

## 2021-05-05 PROCEDURE — 85025 COMPLETE CBC W/AUTO DIFF WBC: CPT

## 2021-05-05 PROCEDURE — 83036 HEMOGLOBIN GLYCOSYLATED A1C: CPT

## 2021-05-05 PROCEDURE — 84439 ASSAY OF FREE THYROXINE: CPT

## 2021-05-05 PROCEDURE — 84480 ASSAY TRIIODOTHYRONINE (T3): CPT

## 2021-05-05 PROCEDURE — 80061 LIPID PANEL: CPT

## 2021-05-05 PROCEDURE — 80053 COMPREHEN METABOLIC PANEL: CPT

## 2021-05-05 PROCEDURE — 84443 ASSAY THYROID STIM HORMONE: CPT

## 2021-05-05 PROCEDURE — 82043 UR ALBUMIN QUANTITATIVE: CPT | Performed by: INTERNAL MEDICINE

## 2021-05-05 PROCEDURE — 82570 ASSAY OF URINE CREATININE: CPT | Performed by: INTERNAL MEDICINE

## 2021-05-11 ENCOUNTER — OFFICE VISIT (OUTPATIENT)
Dept: SLEEP CENTER | Facility: CLINIC | Age: 71
End: 2021-05-11
Payer: MEDICARE

## 2021-05-11 VITALS
BODY MASS INDEX: 30.32 KG/M2 | WEIGHT: 182 LBS | SYSTOLIC BLOOD PRESSURE: 120 MMHG | HEIGHT: 65 IN | DIASTOLIC BLOOD PRESSURE: 80 MMHG

## 2021-05-11 DIAGNOSIS — G47.33 OSA (OBSTRUCTIVE SLEEP APNEA): Primary | ICD-10-CM

## 2021-05-11 PROCEDURE — 99213 OFFICE O/P EST LOW 20 MIN: CPT | Performed by: INTERNAL MEDICINE

## 2021-05-11 RX ORDER — BLOOD SUGAR DIAGNOSTIC
STRIP MISCELLANEOUS
COMMUNITY
Start: 2021-05-07

## 2021-05-11 RX ORDER — AMLODIPINE BESYLATE 2.5 MG/1
TABLET ORAL
COMMUNITY
Start: 2021-01-27

## 2021-05-11 RX ORDER — LISINOPRIL 10 MG/1
TABLET ORAL
COMMUNITY
Start: 2021-03-27

## 2021-05-11 NOTE — PROGRESS NOTES
Progress Note - Sleep Center   Thuy Styles LRL:[birth year] MRN: [id number]      Reason for Visit:  69 y o male here for annual follow-up    Assessment:  Doing well on current therapy of APAP 4 to 20 cm for  Previously diagnosed obstructive sleep apnea  The patient requested that his ramp be discontinued  Otherwise everything seems satisfactory  Plan:  Continue same    Follow up: One year    History of Present Illness:  History of MOHIT on PAP therapy  Fully compliant and deriving benefit  Review of Systems      Genitourinary none   Cardiology none   Gastrointestinal none   Neurology forgetfulness, poor concentration or confusion,  and difficulty with memory   Constitutional none   Integumentary none   Psychiatry none   Musculoskeletal none   Pulmonary shortness of breath with activity   ENT none   Endocrine none   Hematological none           I have reviewed and updated the review of systems as necessary      Historical Information    Past Medical History: No past medical history on file  Past Surgical History: No past surgical history on file      Social History:   Social History     Socioeconomic History    Marital status: /Civil Union     Spouse name: None    Number of children: None    Years of education: None    Highest education level: None   Occupational History    None   Social Needs    Financial resource strain: None    Food insecurity     Worry: None     Inability: None    Transportation needs     Medical: None     Non-medical: None   Tobacco Use    Smoking status: Never Smoker    Smokeless tobacco: Never Used   Substance and Sexual Activity    Alcohol use: Yes     Comment: occasional    Drug use: No    Sexual activity: None   Lifestyle    Physical activity     Days per week: None     Minutes per session: None    Stress: None   Relationships    Social connections     Talks on phone: None     Gets together: None     Attends [employer] service: None     Active member of club or organization: None     Attends meetings of clubs or organizations: None     Relationship status: None    Intimate partner violence     Fear of current or ex partner: None     Emotionally abused: None     Physically abused: None     Forced sexual activity: None   Other Topics Concern    None   Social History Narrative    None       Family History: No family history on file  Medications/Allergies:      Current Outpatient Medications:     Accu-Chek Emily Plus test strip, , Disp: , Rfl:     amLODIPine (NORVASC) 2 5 mg tablet, take 1 tablet by mouth twice a day, Disp: , Rfl:     ASPIRIN 81 PO, Take by mouth, Disp: , Rfl:     atorvastatin (LIPITOR) 40 mg tablet, Take 40 mg by mouth daily, Disp: , Rfl:     Choline Fenofibrate (FENOFIBRIC ACID) 135 MG CPDR, Take by mouth, Disp: , Rfl:     dexlansoprazole (DEXILANT) 60 MG capsule, Take 60 mg by mouth daily, Disp: , Rfl:     glimepiride (AMARYL) 2 mg tablet, , Disp: , Rfl: 0    lisinopril (ZESTRIL) 10 mg tablet, , Disp: , Rfl:     lisinopril (ZESTRIL) 5 mg tablet, Take 10 mg by mouth daily , Disp: , Rfl:     metoprolol tartrate (LOPRESSOR) 50 mg tablet, Take 25 mg by mouth every 12 (twelve) hours, Disp: , Rfl:     ranolazine (RANEXA) 500 mg 12 hr tablet, Take 500 mg by mouth 2 (two) times a day, Disp: , Rfl:     SITagliptin-MetFORMIN HCl ER (JANUMET XR) 100-1000 MG TB24, Take 1 tablet by mouth daily with dinner, Disp: , Rfl:           Objective      Vital Signs:   Vitals:    05/11/21 1343   BP: 120/80     [place] Sleepiness Scale:  Total score: 14        Physical Exam:    General: Alert, appropriate, cooperative, overweight    Head: NC/AT    Skin: Warm, dry    Neuro: No motor abnormalities, cranial nerves appear intact    Extremity: No clubbing, cyanosis      DME Provider:  [address] / Coordination of Care   I have spent  15 minutes with the patient today in which greater than 50% of this time was spent in counseling/coordination of care regarding: equipment and compliance  Board Certified Sleep Specialist    Portions of the record may have been created with voice recognition software  Occasional wrong word or "sound a like" substitutions may have occurred due to the inherent limitations of voice recognition software  Read the chart carefully and recognize, using context, where substitutions have occurred

## 2021-05-12 ENCOUNTER — TELEPHONE (OUTPATIENT)
Dept: SLEEP CENTER | Facility: CLINIC | Age: 71
End: 2021-05-12

## 2021-11-01 ENCOUNTER — APPOINTMENT (OUTPATIENT)
Dept: LAB | Age: 71
End: 2021-11-01
Payer: MEDICARE

## 2021-11-01 DIAGNOSIS — E11.9 TYPE 2 DIABETES MELLITUS WITHOUT COMPLICATION, UNSPECIFIED WHETHER LONG TERM INSULIN USE (HCC): ICD-10-CM

## 2021-11-01 DIAGNOSIS — E78.5 HYPERLIPIDEMIA, UNSPECIFIED HYPERLIPIDEMIA TYPE: ICD-10-CM

## 2021-11-01 DIAGNOSIS — R53.83 FATIGUE, UNSPECIFIED TYPE: ICD-10-CM

## 2021-11-01 LAB
ALBUMIN SERPL BCP-MCNC: 3.7 G/DL (ref 3.5–5)
ALP SERPL-CCNC: 58 U/L (ref 46–116)
ALT SERPL W P-5'-P-CCNC: 28 U/L (ref 12–78)
ANION GAP SERPL CALCULATED.3IONS-SCNC: 1 MMOL/L (ref 4–13)
AST SERPL W P-5'-P-CCNC: 23 U/L (ref 5–45)
BASOPHILS # BLD AUTO: 0.03 THOUSANDS/ΜL (ref 0–0.1)
BASOPHILS NFR BLD AUTO: 1 % (ref 0–1)
BILIRUB SERPL-MCNC: 0.6 MG/DL (ref 0.2–1)
BUN SERPL-MCNC: 18 MG/DL (ref 5–25)
CALCIUM SERPL-MCNC: 9.9 MG/DL (ref 8.3–10.1)
CHLORIDE SERPL-SCNC: 108 MMOL/L (ref 100–108)
CO2 SERPL-SCNC: 29 MMOL/L (ref 21–32)
CREAT SERPL-MCNC: 1.36 MG/DL (ref 0.6–1.3)
EOSINOPHIL # BLD AUTO: 0.14 THOUSAND/ΜL (ref 0–0.61)
EOSINOPHIL NFR BLD AUTO: 3 % (ref 0–6)
ERYTHROCYTE [DISTWIDTH] IN BLOOD BY AUTOMATED COUNT: 12.3 % (ref 11.6–15.1)
EST. AVERAGE GLUCOSE BLD GHB EST-MCNC: 123 MG/DL
GFR SERPL CREATININE-BSD FRML MDRD: 52 ML/MIN/1.73SQ M
GLUCOSE P FAST SERPL-MCNC: 137 MG/DL (ref 65–99)
HBA1C MFR BLD: 5.9 %
HCT VFR BLD AUTO: 42.3 % (ref 36.5–49.3)
HGB BLD-MCNC: 14.2 G/DL (ref 12–17)
IMM GRANULOCYTES # BLD AUTO: 0.04 THOUSAND/UL (ref 0–0.2)
IMM GRANULOCYTES NFR BLD AUTO: 1 % (ref 0–2)
LYMPHOCYTES # BLD AUTO: 1.61 THOUSANDS/ΜL (ref 0.6–4.47)
LYMPHOCYTES NFR BLD AUTO: 29 % (ref 14–44)
MCH RBC QN AUTO: 34.1 PG (ref 26.8–34.3)
MCHC RBC AUTO-ENTMCNC: 33.6 G/DL (ref 31.4–37.4)
MCV RBC AUTO: 101 FL (ref 82–98)
MONOCYTES # BLD AUTO: 0.47 THOUSAND/ΜL (ref 0.17–1.22)
MONOCYTES NFR BLD AUTO: 8 % (ref 4–12)
NEUTROPHILS # BLD AUTO: 3.3 THOUSANDS/ΜL (ref 1.85–7.62)
NEUTS SEG NFR BLD AUTO: 58 % (ref 43–75)
NRBC BLD AUTO-RTO: 0 /100 WBCS
PLATELET # BLD AUTO: 177 THOUSANDS/UL (ref 149–390)
PMV BLD AUTO: 11.5 FL (ref 8.9–12.7)
POTASSIUM SERPL-SCNC: 4.2 MMOL/L (ref 3.5–5.3)
PROT SERPL-MCNC: 7.1 G/DL (ref 6.4–8.2)
RBC # BLD AUTO: 4.17 MILLION/UL (ref 3.88–5.62)
SODIUM SERPL-SCNC: 138 MMOL/L (ref 136–145)
WBC # BLD AUTO: 5.59 THOUSAND/UL (ref 4.31–10.16)

## 2021-11-01 PROCEDURE — 83036 HEMOGLOBIN GLYCOSYLATED A1C: CPT

## 2021-11-01 PROCEDURE — 36415 COLL VENOUS BLD VENIPUNCTURE: CPT

## 2021-11-01 PROCEDURE — 80053 COMPREHEN METABOLIC PANEL: CPT

## 2021-11-01 PROCEDURE — 85025 COMPLETE CBC W/AUTO DIFF WBC: CPT

## 2022-03-23 ENCOUNTER — TELEPHONE (OUTPATIENT)
Dept: UROLOGY | Facility: MEDICAL CENTER | Age: 72
End: 2022-03-23

## 2022-03-23 NOTE — TELEPHONE ENCOUNTER
Received call from patient   Patient states he is having trouble with urine flow for the last three months  Patient states that he does not feel like he is emptying  Patient states he had a procedure with dr Julee Cadena in 2007  Green light laser fulgeration of the prostate  Patient states he was following urological health with pcp  30 minute appt 4/15/22 given to reestablish care with practice  Requested patient to contact pcp for psa   Last one done on 1/6/21

## 2022-03-24 ENCOUNTER — APPOINTMENT (OUTPATIENT)
Dept: LAB | Age: 72
End: 2022-03-24
Payer: MEDICARE

## 2022-03-24 DIAGNOSIS — R35.0 URINARY FREQUENCY: ICD-10-CM

## 2022-03-24 LAB — PSA SERPL-MCNC: 0.6 NG/ML (ref 0–4)

## 2022-03-24 PROCEDURE — 84153 ASSAY OF PSA TOTAL: CPT

## 2022-04-02 ENCOUNTER — APPOINTMENT (OUTPATIENT)
Dept: LAB | Age: 72
End: 2022-04-02
Payer: MEDICARE

## 2022-04-02 DIAGNOSIS — Z95.5 STENTED CORONARY ARTERY: ICD-10-CM

## 2022-04-02 DIAGNOSIS — E78.2 MIXED HYPERLIPIDEMIA: ICD-10-CM

## 2022-04-02 DIAGNOSIS — I10 ESSENTIAL HYPERTENSION, MALIGNANT: ICD-10-CM

## 2022-04-02 DIAGNOSIS — I25.2 OLD MYOCARDIAL INFARCTION: ICD-10-CM

## 2022-04-02 LAB
25(OH)D3 SERPL-MCNC: 64.3 NG/ML (ref 30–100)
ALBUMIN SERPL BCP-MCNC: 3.9 G/DL (ref 3.5–5)
ALP SERPL-CCNC: 64 U/L (ref 46–116)
ALT SERPL W P-5'-P-CCNC: 36 U/L (ref 12–78)
ANION GAP SERPL CALCULATED.3IONS-SCNC: 4 MMOL/L (ref 4–13)
AST SERPL W P-5'-P-CCNC: 33 U/L (ref 5–45)
BILIRUB SERPL-MCNC: 0.7 MG/DL (ref 0.2–1)
BUN SERPL-MCNC: 18 MG/DL (ref 5–25)
CALCIUM SERPL-MCNC: 9.3 MG/DL (ref 8.3–10.1)
CHLORIDE SERPL-SCNC: 109 MMOL/L (ref 100–108)
CHOLEST SERPL-MCNC: 119 MG/DL
CO2 SERPL-SCNC: 27 MMOL/L (ref 21–32)
CREAT SERPL-MCNC: 1.35 MG/DL (ref 0.6–1.3)
GFR SERPL CREATININE-BSD FRML MDRD: 52 ML/MIN/1.73SQ M
GLUCOSE P FAST SERPL-MCNC: 184 MG/DL (ref 65–99)
HDLC SERPL-MCNC: 26 MG/DL
LDLC SERPL CALC-MCNC: 51 MG/DL (ref 0–100)
NONHDLC SERPL-MCNC: 93 MG/DL
POTASSIUM SERPL-SCNC: 4.3 MMOL/L (ref 3.5–5.3)
PROT SERPL-MCNC: 6.8 G/DL (ref 6.4–8.2)
SODIUM SERPL-SCNC: 140 MMOL/L (ref 136–145)
TRIGL SERPL-MCNC: 209 MG/DL

## 2022-04-02 PROCEDURE — 36415 COLL VENOUS BLD VENIPUNCTURE: CPT

## 2022-04-02 PROCEDURE — 82306 VITAMIN D 25 HYDROXY: CPT

## 2022-04-02 PROCEDURE — 80061 LIPID PANEL: CPT

## 2022-04-02 PROCEDURE — 80053 COMPREHEN METABOLIC PANEL: CPT

## 2022-04-15 ENCOUNTER — OFFICE VISIT (OUTPATIENT)
Dept: UROLOGY | Facility: AMBULATORY SURGERY CENTER | Age: 72
End: 2022-04-15
Payer: MEDICARE

## 2022-04-15 VITALS
SYSTOLIC BLOOD PRESSURE: 120 MMHG | OXYGEN SATURATION: 97 % | BODY MASS INDEX: 30.99 KG/M2 | WEIGHT: 186 LBS | DIASTOLIC BLOOD PRESSURE: 86 MMHG | HEART RATE: 48 BPM | HEIGHT: 65 IN

## 2022-04-15 DIAGNOSIS — R39.12 WEAK URINARY STREAM: Primary | ICD-10-CM

## 2022-04-15 LAB — POST-VOID RESIDUAL VOLUME, ML POC: 370 ML

## 2022-04-15 PROCEDURE — 99203 OFFICE O/P NEW LOW 30 MIN: CPT | Performed by: NURSE PRACTITIONER

## 2022-04-15 PROCEDURE — 51798 US URINE CAPACITY MEASURE: CPT | Performed by: NURSE PRACTITIONER

## 2022-04-15 RX ORDER — TAMSULOSIN HYDROCHLORIDE 0.4 MG/1
0.4 CAPSULE ORAL
Qty: 30 CAPSULE | Refills: 3 | Status: SHIPPED | OUTPATIENT
Start: 2022-04-15

## 2022-04-15 NOTE — PROGRESS NOTES
04/15/22    Renata [facility]   1950   [id number]     Assessment  1 BPH with LUTS s/p green light laser (2007)    Discussion/Plan  1 BPH with LUTS s/p green light laser (2007)        Patient did not wish to double void for repeat PVR    Refused catheterization in the office    AUA 12   Trial Tamsulosin 0 4 mg daily - mechanism of action and side effects reviewed   Double void, encouraged hydration with water 48-64 oz daily    Consider cystoscopy for surgical discussion     Patient will start Tamsulosin and return in 6 weeks to review efficacy with AUA/PVR  Michelle Jimenez is a 66year old male who presents in follow up to re-establish care  He was previously known to Dr Pushpa Merchant and last seen in 2017  He has history of BPH s/p green light laser photo vaporization in 2007 by Dr Bela Arriola  He was experiencing frequent UTI and high PVR  Since that time he has been doing relatively well with minimal lower urinary tract symptoms  He states that he voids with an adequate urinary stream and he empties to completion  He states his last PSA is from April 2016 and he reports it 0 3  He was recently referred by his family physician after 3 lower urinary tract infections after starting Januvia for his diabetes  He has since discontinued the Januvia and he is maintained on Janumet XR and glimepiride  No family history of prostate, kidney or bladder cancer  He denies dysuria, gross hematuria or incontinence  He does not report sensation of incomplete bladder emptying       Component      Latest Ref Rng & Units 6/7/2017 10/4/2019 1/6/2021 3/24/2022          10:37 AM  8:02 AM  8:43 AM 10:35 AM   PSA, Total      0 0 - 4 0 ng/mL 0 4 0 6 0 5 0 6     Review of Systems - History obtained from chart review and the patient  General ROS: negative  Psychological ROS: negative  Endocrine ROS: negative  Respiratory ROS: no cough, shortness of breath, or wheezing  Cardiovascular ROS: no chest pain or dyspnea on exertion  Gastrointestinal ROS: no abdominal pain, change in bowel habits, or black or bloody stools  Genito-Urinary ROS: positive for - change in urinary stream  Musculoskeletal ROS: negative  Neurological ROS: no TIA or stroke symptoms  Dermatological ROS: negative     Objective  Physical Exam  Vitals and nursing note reviewed  Constitutional:       General: He is awake  He is not in acute distress  Appearance: Normal appearance  He is well-developed, well-groomed and normal weight  He is not ill-appearing, toxic-appearing or diaphoretic  Pulmonary:      Effort: Pulmonary effort is normal    Abdominal:      Tenderness: There is no right CVA tenderness or left CVA tenderness  Musculoskeletal:         General: Normal range of motion  Cervical back: Normal range of motion and neck supple  Skin:     General: Skin is warm  Neurological:      General: No focal deficit present  Mental Status: He is alert and oriented to person, place, and time  Mental status is at baseline  Psychiatric:         Attention and Perception: Attention normal          Mood and Affect: Mood normal          Speech: Speech normal          Behavior: Behavior normal  Behavior is cooperative  Thought Content: Thought content normal          Cognition and Memory: Cognition normal          Judgment: Judgment normal        AUA SYMPTOM SCORE      Most Recent Value   AUA SYMPTOM SCORE    How often have you had a sensation of not emptying your bladder completely after you finished urinating? 0   How often have you had to urinate again less than two hours after you finished urinating? 2   How often have you found you stopped and started again several times when you urinate? 3   How often have you found it difficult to postpone urination? 0   How often have you had a weak urinary stream? 5   How often have you had to push or strain to begin urination?  2   How many times did you most typically get up to urinate from the time you went to bed at night until the time you got up in the morning? 0   Quality of Life: If you were to spend the rest of your life with your urinary condition just the way it is now, how would you feel about that? 5   AUA SYMPTOM SCORE 12              SAMY Wright     I have spent 15 minutes with Patient  today in which greater than 50% of this time was spent in counseling/coordination of care regarding Risks and benefits of tx options, Intructions for management, Patient and family education and Importance of tx compliance

## 2022-05-05 ENCOUNTER — APPOINTMENT (OUTPATIENT)
Dept: LAB | Age: 72
End: 2022-05-05
Payer: MEDICARE

## 2022-05-05 DIAGNOSIS — R35.0 URINARY FREQUENCY: ICD-10-CM

## 2022-05-05 DIAGNOSIS — E13.69 OTHER SPECIFIED DIABETES MELLITUS WITH OTHER SPECIFIED COMPLICATION, UNSPECIFIED WHETHER LONG TERM INSULIN USE (HCC): ICD-10-CM

## 2022-05-05 DIAGNOSIS — R39.0 EXTRAVASATION OF URINE: ICD-10-CM

## 2022-05-05 LAB
ALBUMIN SERPL BCP-MCNC: 3.7 G/DL (ref 3.5–5)
ALP SERPL-CCNC: 58 U/L (ref 46–116)
ALT SERPL W P-5'-P-CCNC: 30 U/L (ref 12–78)
ANION GAP SERPL CALCULATED.3IONS-SCNC: 6 MMOL/L (ref 4–13)
AST SERPL W P-5'-P-CCNC: 36 U/L (ref 5–45)
BASOPHILS # BLD AUTO: 0.03 THOUSANDS/ΜL (ref 0–0.1)
BASOPHILS NFR BLD AUTO: 1 % (ref 0–1)
BILIRUB SERPL-MCNC: 0.61 MG/DL (ref 0.2–1)
BUN SERPL-MCNC: 16 MG/DL (ref 5–25)
CALCIUM SERPL-MCNC: 9 MG/DL (ref 8.3–10.1)
CHLORIDE SERPL-SCNC: 109 MMOL/L (ref 100–108)
CHOLEST SERPL-MCNC: 111 MG/DL
CO2 SERPL-SCNC: 25 MMOL/L (ref 21–32)
CREAT SERPL-MCNC: 1.22 MG/DL (ref 0.6–1.3)
EOSINOPHIL # BLD AUTO: 0.12 THOUSAND/ΜL (ref 0–0.61)
EOSINOPHIL NFR BLD AUTO: 3 % (ref 0–6)
ERYTHROCYTE [DISTWIDTH] IN BLOOD BY AUTOMATED COUNT: 12.2 % (ref 11.6–15.1)
EST. AVERAGE GLUCOSE BLD GHB EST-MCNC: 154 MG/DL
GFR SERPL CREATININE-BSD FRML MDRD: 58 ML/MIN/1.73SQ M
GLUCOSE P FAST SERPL-MCNC: 191 MG/DL (ref 65–99)
HBA1C MFR BLD: 7 %
HCT VFR BLD AUTO: 41.6 % (ref 36.5–49.3)
HDLC SERPL-MCNC: 19 MG/DL
HGB BLD-MCNC: 14.4 G/DL (ref 12–17)
IMM GRANULOCYTES # BLD AUTO: 0.03 THOUSAND/UL (ref 0–0.2)
IMM GRANULOCYTES NFR BLD AUTO: 1 % (ref 0–2)
LDLC SERPL CALC-MCNC: 57 MG/DL (ref 0–100)
LYMPHOCYTES # BLD AUTO: 1.42 THOUSANDS/ΜL (ref 0.6–4.47)
LYMPHOCYTES NFR BLD AUTO: 29 % (ref 14–44)
MCH RBC QN AUTO: 34.2 PG (ref 26.8–34.3)
MCHC RBC AUTO-ENTMCNC: 34.6 G/DL (ref 31.4–37.4)
MCV RBC AUTO: 99 FL (ref 82–98)
MONOCYTES # BLD AUTO: 0.37 THOUSAND/ΜL (ref 0.17–1.22)
MONOCYTES NFR BLD AUTO: 8 % (ref 4–12)
NEUTROPHILS # BLD AUTO: 2.89 THOUSANDS/ΜL (ref 1.85–7.62)
NEUTS SEG NFR BLD AUTO: 58 % (ref 43–75)
NONHDLC SERPL-MCNC: 92 MG/DL
NRBC BLD AUTO-RTO: 0 /100 WBCS
PLATELET # BLD AUTO: 142 THOUSANDS/UL (ref 149–390)
PMV BLD AUTO: 12 FL (ref 8.9–12.7)
POTASSIUM SERPL-SCNC: 4.5 MMOL/L (ref 3.5–5.3)
PROT SERPL-MCNC: 6.5 G/DL (ref 6.4–8.2)
PSA SERPL-MCNC: 0.6 NG/ML (ref 0–4)
RBC # BLD AUTO: 4.21 MILLION/UL (ref 3.88–5.62)
SODIUM SERPL-SCNC: 140 MMOL/L (ref 136–145)
T3 SERPL-MCNC: 0.9 NG/ML (ref 0.6–1.8)
T4 FREE SERPL-MCNC: 0.94 NG/DL (ref 0.76–1.46)
TRIGL SERPL-MCNC: 177 MG/DL
TSH SERPL DL<=0.05 MIU/L-ACNC: 4.33 UIU/ML (ref 0.45–4.5)
WBC # BLD AUTO: 4.86 THOUSAND/UL (ref 4.31–10.16)

## 2022-05-05 PROCEDURE — 80061 LIPID PANEL: CPT

## 2022-05-05 PROCEDURE — 84480 ASSAY TRIIODOTHYRONINE (T3): CPT

## 2022-05-05 PROCEDURE — 36415 COLL VENOUS BLD VENIPUNCTURE: CPT

## 2022-05-05 PROCEDURE — 84153 ASSAY OF PSA TOTAL: CPT

## 2022-05-05 PROCEDURE — 85025 COMPLETE CBC W/AUTO DIFF WBC: CPT

## 2022-05-05 PROCEDURE — 83036 HEMOGLOBIN GLYCOSYLATED A1C: CPT

## 2022-05-05 PROCEDURE — 84443 ASSAY THYROID STIM HORMONE: CPT

## 2022-05-05 PROCEDURE — 80053 COMPREHEN METABOLIC PANEL: CPT

## 2022-05-05 PROCEDURE — 84439 ASSAY OF FREE THYROXINE: CPT

## 2022-05-13 ENCOUNTER — TELEPHONE (OUTPATIENT)
Dept: SLEEP CENTER | Facility: CLINIC | Age: 72
End: 2022-05-13

## 2022-05-13 ENCOUNTER — OFFICE VISIT (OUTPATIENT)
Dept: SLEEP CENTER | Facility: CLINIC | Age: 72
End: 2022-05-13
Payer: MEDICARE

## 2022-05-13 VITALS
BODY MASS INDEX: 30.72 KG/M2 | SYSTOLIC BLOOD PRESSURE: 116 MMHG | WEIGHT: 184.4 LBS | HEIGHT: 65 IN | OXYGEN SATURATION: 95 % | DIASTOLIC BLOOD PRESSURE: 70 MMHG | HEART RATE: 48 BPM

## 2022-05-13 DIAGNOSIS — G47.33 OSA (OBSTRUCTIVE SLEEP APNEA): Primary | ICD-10-CM

## 2022-05-13 PROCEDURE — 99214 OFFICE O/P EST MOD 30 MIN: CPT | Performed by: INTERNAL MEDICINE

## 2022-05-13 RX ORDER — ROSUVASTATIN CALCIUM 20 MG/1
20 TABLET, COATED ORAL DAILY
COMMUNITY
Start: 2022-05-11

## 2022-05-13 RX ORDER — DIPHENOXYLATE HYDROCHLORIDE AND ATROPINE SULFATE 2.5; .025 MG/1; MG/1
1 TABLET ORAL DAILY
COMMUNITY

## 2022-05-13 NOTE — PROGRESS NOTES
Progress Note - Sleep Center   Compa Maguire GMB:[birth year] MRN: [id number]      Reason for Visit:  66 y o male here for annual follow-up    Assessment:  The patient feels less refreshed on his current settings of APAP 4 to 20 cm for previously diagnosed obstructive sleep apnea  Prior testing and prior prescription are not available  The patient had been using a different company  I recommended that the patient have a titration study, which he declined  In the meantime, based on prior compliance data, I will change his pressure to 8 to 12 cm  If he is still having difficulty, I will have him undergo a titration study, to which he agrees  He will bring his machine back this afternoon to have the pressure adjusted and to download his compliance information  Plan:  Pressure adjusted to APAP 8 to 12 cm based on prior compliance data  His current data will be reviewed  If there is still a question about the best pressure, I will have him get a positive airway pressure titration study  Follow up: One year    History of Present Illness:  History of MOHIT on PAP therapy  Fully compliant and deriving benefit  Review of Systems      Genitourinary none   Cardiology none   Gastrointestinal frequent heartburn/acid reflux   Neurology balance problems   Constitutional fatigue   Integumentary none   Psychiatry none   Musculoskeletal none   Pulmonary shortness of breath with activity and difficulty breathing when lying flat    ENT ringing in ears   Endocrine none   Hematological none       I have reviewed and updated the review of systems as necessary      Historical Information    Past Medical History: No past medical history on file  Past Surgical History: No past surgical history on file      Social History:   Social History     Socioeconomic History    Marital status: /Civil Union     Spouse name: None    Number of children: None    Years of education: None    Highest education level: None Occupational History    None   Tobacco Use    Smoking status: Never Smoker    Smokeless tobacco: Never Used   Substance and Sexual Activity    Alcohol use: Yes     Comment: occasional    Drug use: No    Sexual activity: None   Other Topics Concern    None   Social History Narrative    None     Social Determinants of Health     Financial Resource Strain: Not on file   Food Insecurity: Not on file   Transportation Needs: Not on file   Physical Activity: Not on file   Stress: Not on file   Social Connections: Not on file   Intimate Partner Violence: Not on file   Housing Stability: Not on file       Family History: No family history on file      Medications/Allergies:      Current Outpatient Medications:     Accu-Chek Emily Plus test strip, , Disp: , Rfl:     amLODIPine (NORVASC) 2 5 mg tablet, take 1 tablet by mouth twice a day, Disp: , Rfl:     ASPIRIN 81 PO, Take by mouth, Disp: , Rfl:     Choline Fenofibrate (FENOFIBRIC ACID) 135 MG CPDR, Take by mouth, Disp: , Rfl:     dexlansoprazole (DEXILANT) 60 MG capsule, Take 60 mg by mouth daily, Disp: , Rfl:     glimepiride (AMARYL) 2 mg tablet, , Disp: , Rfl: 0    lisinopril (ZESTRIL) 5 mg tablet, Take 10 mg by mouth daily , Disp: , Rfl:     metoprolol tartrate (LOPRESSOR) 50 mg tablet, Take 25 mg by mouth every 12 (twelve) hours, Disp: , Rfl:     multivitamin (THERAGRAN) TABS, Take 1 tablet by mouth in the morning , Disp: , Rfl:     rosuvastatin (CRESTOR) 20 MG tablet, Take 20 mg by mouth in the morning , Disp: , Rfl:     SITagliptin-metFORMIN HCl -1000 MG TB24, Take 1 tablet by mouth daily with dinner, Disp: , Rfl:     tamsulosin (FLOMAX) 0 4 mg, Take 1 capsule (0 4 mg total) by mouth daily with dinner, Disp: 30 capsule, Rfl: 3    lisinopril (ZESTRIL) 10 mg tablet, , Disp: , Rfl:     ranolazine (RANEXA) 500 mg 12 hr tablet, Take 500 mg by mouth 2 (two) times a day (Patient not taking: Reported on 5/13/2022), Disp: , Rfl: Objective      Vital Signs:   Vitals:    05/13/22 0950   BP: 116/70   Pulse: (!) 48   SpO2: 95%     [place] Sleepiness Scale: Total score: 16        Physical Exam:    General: Alert, appropriate, cooperative, overweight    Head: NC/AT    Skin: Warm, dry    Neuro: No motor abnormalities, cranial nerves appear intact    Extremity: No clubbing, cyanosis      DME Provider: [facility]        Counseling / Coordination of Care   I have spent 15 minutes with the patient today in which greater than 50% of this time was spent in counseling/coordination of care regarding: equipment and compliance  Board Certified Sleep Specialist    Portions of the record may have been created with voice recognition software  Occasional wrong word or "sound a like" substitutions may have occurred due to the inherent limitations of voice recognition software  Read the chart carefully and recognize, using context, where substitutions have occurred

## 2022-05-16 ENCOUNTER — TELEPHONE (OUTPATIENT)
Dept: SLEEP CENTER | Facility: CLINIC | Age: 72
End: 2022-05-16

## 2022-05-24 LAB
DME PARACHUTE DELIVERY DATE ACTUAL: NORMAL
DME PARACHUTE DELIVERY DATE REQUESTED: NORMAL
DME PARACHUTE DELIVERY NOTE: NORMAL
DME PARACHUTE ITEM DESCRIPTION: NORMAL
DME PARACHUTE ORDER STATUS: NORMAL
DME PARACHUTE SUPPLIER NAME: NORMAL
DME PARACHUTE SUPPLIER PHONE: NORMAL

## 2022-05-26 ENCOUNTER — OFFICE VISIT (OUTPATIENT)
Dept: UROLOGY | Facility: AMBULATORY SURGERY CENTER | Age: 72
End: 2022-05-26
Payer: MEDICARE

## 2022-05-26 VITALS
DIASTOLIC BLOOD PRESSURE: 72 MMHG | HEIGHT: 65 IN | WEIGHT: 184 LBS | BODY MASS INDEX: 30.66 KG/M2 | SYSTOLIC BLOOD PRESSURE: 122 MMHG | HEART RATE: 70 BPM

## 2022-05-26 DIAGNOSIS — R39.12 WEAK URINARY STREAM: Primary | ICD-10-CM

## 2022-05-26 DIAGNOSIS — R39.12 BENIGN PROSTATIC HYPERPLASIA WITH WEAK URINARY STREAM: ICD-10-CM

## 2022-05-26 DIAGNOSIS — N40.1 BENIGN PROSTATIC HYPERPLASIA WITH WEAK URINARY STREAM: ICD-10-CM

## 2022-05-26 LAB — POST-VOID RESIDUAL VOLUME, ML POC: 202 ML

## 2022-05-26 PROCEDURE — 99213 OFFICE O/P EST LOW 20 MIN: CPT | Performed by: NURSE PRACTITIONER

## 2022-05-26 PROCEDURE — 51798 US URINE CAPACITY MEASURE: CPT | Performed by: NURSE PRACTITIONER

## 2022-05-26 RX ORDER — FINASTERIDE 5 MG/1
5 TABLET, FILM COATED ORAL DAILY
Qty: 90 TABLET | Refills: 3 | Status: SHIPPED | OUTPATIENT
Start: 2022-05-26

## 2022-05-26 NOTE — PROGRESS NOTES
05/26/22    Rosanna Hooks   1950   [id number]     Assessment  1 BPH with LUTS s/p green light laser (2007)     Discussion/Plan  1 BPH with LUTS s/p green light laser (2007)                             AUA 14              Continue Tamsulosin 0 4 mg daily   Begin Finasteride 5 mg daily               Double voiding, encouraged hydration with water 48-64 oz daily               Cystoscopy recommended for surgical discussion - patient will consider      Patient will start Finasteride, continue Tamsulosin and return in 8 weeks to review efficacy with AUA/PVR  Ami Montes is a 66year old male who presents in follow up to evaluate urinary pattern  He was previously known to Dr Delfin Zavaleta and last seen in 2017  He has history of BPH s/p green light laser photo vaporization in 2007 by Dr Toya Vaughan  He was experiencing frequent UTI and high post-void residuals  He was recently referred by his family physician after 3 lower urinary tract infections after starting Januvia for his diabetes  He has since discontinued the Januvia and he is maintained on Janumet XR and glimepiride  No family history of prostate, kidney or bladder cancer  He denies dysuria, gross hematuria or incontinence  He was started on Tamsulosin at last appointment  He had PVR of 370 and did not have sensation of retention  He refused catheterization  He returns for PVR  He is a vague historian  He states he has not noticed much improvement with medication       Component      Latest Ref Rng & Units 4/15/2022 5/26/2022           1:39 PM 10:35 AM   POST-VOID RESIDUAL VOLUME, ML POC      mL 370 202     Review of Systems - History obtained from chart review and the patient  General ROS: negative  Psychological ROS: negative  Endocrine ROS: negative  Respiratory ROS: no cough, shortness of breath, or wheezing  Cardiovascular ROS: no chest pain or dyspnea on exertion  Gastrointestinal ROS: no abdominal pain, change in bowel habits, or black or bloody stools  Genito-Urinary ROS: positive for - change in urinary stream and urinary frequency/urgency  Musculoskeletal ROS: negative  Neurological ROS: no TIA or stroke symptoms  Dermatological ROS: negative       Objective  Physical Exam  Vitals and nursing note reviewed  Constitutional:       General: He is awake  He is not in acute distress  Appearance: Normal appearance  He is well-developed, well-groomed and normal weight  He is not ill-appearing, toxic-appearing or diaphoretic  Pulmonary:      Effort: Pulmonary effort is normal    Abdominal:      Tenderness: There is no right CVA tenderness or left CVA tenderness  Musculoskeletal:         General: Normal range of motion  Cervical back: Normal range of motion and neck supple  Skin:     General: Skin is warm  Neurological:      General: No focal deficit present  Mental Status: He is alert and oriented to person, place, and time  Mental status is at baseline  Psychiatric:         Attention and Perception: Attention normal          Mood and Affect: Mood normal  Affect is flat  Speech: Speech normal          Behavior: Behavior normal  Behavior is cooperative  Thought Content: Thought content normal          Cognition and Memory: Cognition normal          Judgment: Judgment normal        AUA SYMPTOM SCORE    Flowsheet Row Most Recent Value   AUA SYMPTOM SCORE    How often have you had a sensation of not emptying your bladder completely after you finished urinating? 3   How often have you had to urinate again less than two hours after you finished urinating? 1   How often have you found you stopped and started again several times when you urinate? 3   How often have you found it difficult to postpone urination? 0   How often have you had a weak urinary stream? 5   How often have you had to push or strain to begin urination?  0   How many times did you most typically get up to urinate from the time you went to bed at night until the time you got up in the morning? 2   Quality of Life: If you were to spend the rest of your life with your urinary condition just the way it is now, how would you feel about that? 3   AUA SYMPTOM SCORE 14            SAMY Schmitt     I have spent 15 minutes with Patient  today in which greater than 50% of this time was spent in counseling/coordination of care regarding Risks and benefits of tx options

## 2022-06-08 ENCOUNTER — APPOINTMENT (OUTPATIENT)
Dept: LAB | Age: 72
End: 2022-06-08
Payer: MEDICARE

## 2022-06-08 DIAGNOSIS — Z79.01 LONG TERM (CURRENT) USE OF ANTICOAGULANTS: ICD-10-CM

## 2022-06-08 DIAGNOSIS — Z12.5 PROSTATE CANCER SCREENING: ICD-10-CM

## 2022-06-08 DIAGNOSIS — E11.9 TYPE 2 DIABETES MELLITUS WITHOUT COMPLICATION, UNSPECIFIED WHETHER LONG TERM INSULIN USE (HCC): ICD-10-CM

## 2022-06-08 LAB
ALBUMIN SERPL BCP-MCNC: 3.8 G/DL (ref 3.5–5)
ALP SERPL-CCNC: 48 U/L (ref 46–116)
ALT SERPL W P-5'-P-CCNC: 27 U/L (ref 12–78)
ANION GAP SERPL CALCULATED.3IONS-SCNC: 9 MMOL/L (ref 4–13)
AST SERPL W P-5'-P-CCNC: 24 U/L (ref 5–45)
BILIRUB SERPL-MCNC: 0.56 MG/DL (ref 0.2–1)
BUN SERPL-MCNC: 22 MG/DL (ref 5–25)
CALCIUM SERPL-MCNC: 9.5 MG/DL (ref 8.3–10.1)
CHLORIDE SERPL-SCNC: 110 MMOL/L (ref 100–108)
CO2 SERPL-SCNC: 23 MMOL/L (ref 21–32)
CREAT SERPL-MCNC: 1.25 MG/DL (ref 0.6–1.3)
GFR SERPL CREATININE-BSD FRML MDRD: 57 ML/MIN/1.73SQ M
GLUCOSE P FAST SERPL-MCNC: 128 MG/DL (ref 65–99)
INR PPP: 1.14 (ref 0.84–1.19)
POTASSIUM SERPL-SCNC: 4 MMOL/L (ref 3.5–5.3)
PROT SERPL-MCNC: 7.1 G/DL (ref 6.4–8.2)
PROTHROMBIN TIME: 14.1 SECONDS (ref 11.6–14.5)
PSA SERPL-MCNC: 0.3 NG/ML (ref 0–4)
SODIUM SERPL-SCNC: 142 MMOL/L (ref 136–145)

## 2022-06-08 PROCEDURE — G0103 PSA SCREENING: HCPCS

## 2022-06-08 PROCEDURE — 80053 COMPREHEN METABOLIC PANEL: CPT

## 2022-06-08 PROCEDURE — 85610 PROTHROMBIN TIME: CPT

## 2022-06-08 PROCEDURE — 36415 COLL VENOUS BLD VENIPUNCTURE: CPT

## 2022-07-27 ENCOUNTER — OFFICE VISIT (OUTPATIENT)
Dept: UROLOGY | Facility: AMBULATORY SURGERY CENTER | Age: 72
End: 2022-07-27
Payer: MEDICARE

## 2022-07-27 VITALS
HEART RATE: 55 BPM | BODY MASS INDEX: 29.62 KG/M2 | DIASTOLIC BLOOD PRESSURE: 72 MMHG | SYSTOLIC BLOOD PRESSURE: 118 MMHG | OXYGEN SATURATION: 96 % | WEIGHT: 178 LBS

## 2022-07-27 DIAGNOSIS — N40.1 BENIGN PROSTATIC HYPERPLASIA WITH WEAK URINARY STREAM: Primary | ICD-10-CM

## 2022-07-27 DIAGNOSIS — R39.12 BENIGN PROSTATIC HYPERPLASIA WITH WEAK URINARY STREAM: Primary | ICD-10-CM

## 2022-07-27 DIAGNOSIS — R39.12 WEAK URINARY STREAM: ICD-10-CM

## 2022-07-27 LAB — POST-VOID RESIDUAL VOLUME, ML POC: 296 ML

## 2022-07-27 PROCEDURE — 51798 US URINE CAPACITY MEASURE: CPT | Performed by: NURSE PRACTITIONER

## 2022-07-27 PROCEDURE — 99213 OFFICE O/P EST LOW 20 MIN: CPT | Performed by: NURSE PRACTITIONER

## 2022-07-27 RX ORDER — FLUTICASONE FUROATE 100 UG/1
POWDER RESPIRATORY (INHALATION)
COMMUNITY

## 2022-07-27 NOTE — PROGRESS NOTES
07/27/22    [facility]   [birth year]   [id number]     Assessment  1 BPH with LUTS s/p green light laser (2007)     Discussion/Plan  1 BPH with LUTS s/p green light laser (2007)                 AUA 14              Continue Tamsulosin 0 4 mg daily  Recommended increasing dose of medication  Patient refused  Continue Finasteride 5 mg daily               Double voiding, encouraged hydration with water 48-64 oz daily               Cystoscopy recommended for surgical discussion  Patient refuses cystoscopy  Patient will schedule follow up with Dr Green Blizzard to discuss hoLEP procedure  Kadi Reese is a 65 year old male who presents in follow up to evaluate PVR and LUTS  He was previously known to Dr Eileen Gray  He has history of BPH s/p green light laser photo vaporization in 2007 by Dr Domitila Stewart  He was told this may need to be repeated in 15-20 years  He was experiencing frequent UTI and high post-void residuals  He was referred by his family physician after 3 lower urinary tract infections after starting Januvia for his diabetes  He has since discontinued the Januvia and he is maintained on Janumet XR and glimepiride  No family history of prostate, kidney or bladder cancer  He denies dysuria, gross hematuria or incontinence  He was started on Tamsulosin at last appointment  He had PVR of 370 and did not have sensation of retention  He refused catheterization  He returns for PVR  He is a vague historian  He states he has not noticed much improvement with medication  His PVR remains high       Review of Systems - History obtained from chart review and the patient  General ROS: negative  Psychological ROS: negative  Endocrine ROS: negative  Respiratory ROS: no cough, shortness of breath, or wheezing  Cardiovascular ROS: no chest pain or dyspnea on exertion  Gastrointestinal ROS: no abdominal pain, change in bowel habits, or black or bloody stools  Genito-Urinary ROS: positive for - frequency  Musculoskeletal ROS: negative  Neurological ROS: negative  Dermatological ROS: negative       Objective  Physical Exam  Vitals and nursing note reviewed  Constitutional:       General: He is awake  He is not in acute distress  Appearance: Normal appearance  He is well-developed, well-groomed and normal weight  He is not ill-appearing, toxic-appearing or diaphoretic  Pulmonary:      Effort: Pulmonary effort is normal    Abdominal:      Tenderness: There is no right CVA tenderness or left CVA tenderness  Musculoskeletal:         General: Normal range of motion  Cervical back: Normal range of motion and neck supple  Skin:     General: Skin is warm  Neurological:      General: No focal deficit present  Mental Status: He is alert and oriented to person, place, and time  Mental status is at baseline  Psychiatric:         Attention and Perception: Attention normal          Mood and Affect: Mood normal  Affect is flat  Speech: Speech normal          Behavior: Behavior normal  Behavior is cooperative  Thought Content: Thought content normal          Cognition and Memory: Cognition normal          Judgment: Judgment normal              SAMY Laughlin     I have spent 15 minutes with Patient  today in which greater than 50% of this time was spent in counseling/coordination of care regarding Diagnostic results, Risks and benefits of tx options, Intructions for management, Patient and family education, Importance of tx compliance and Risk factor reductions

## 2022-08-11 ENCOUNTER — OFFICE VISIT (OUTPATIENT)
Dept: UROLOGY | Facility: AMBULATORY SURGERY CENTER | Age: 72
End: 2022-08-11
Payer: MEDICARE

## 2022-08-11 VITALS
SYSTOLIC BLOOD PRESSURE: 126 MMHG | HEIGHT: 65 IN | HEART RATE: 51 BPM | BODY MASS INDEX: 29.66 KG/M2 | WEIGHT: 178 LBS | DIASTOLIC BLOOD PRESSURE: 76 MMHG

## 2022-08-11 DIAGNOSIS — R39.12 BENIGN PROSTATIC HYPERPLASIA WITH WEAK URINARY STREAM: Primary | ICD-10-CM

## 2022-08-11 DIAGNOSIS — R39.12 WEAK URINARY STREAM: ICD-10-CM

## 2022-08-11 DIAGNOSIS — N40.1 BENIGN PROSTATIC HYPERPLASIA WITH WEAK URINARY STREAM: Primary | ICD-10-CM

## 2022-08-11 LAB — POST-VOID RESIDUAL VOLUME, ML POC: 592 ML

## 2022-08-11 PROCEDURE — 51798 US URINE CAPACITY MEASURE: CPT | Performed by: UROLOGY

## 2022-08-11 PROCEDURE — 99214 OFFICE O/P EST MOD 30 MIN: CPT | Performed by: UROLOGY

## 2022-08-11 RX ORDER — ALBUTEROL SULFATE 90 UG/1
AEROSOL, METERED RESPIRATORY (INHALATION)
COMMUNITY
Start: 2022-07-07

## 2022-08-11 RX ORDER — GLIMEPIRIDE 4 MG/1
4 TABLET ORAL 2 TIMES DAILY
COMMUNITY
Start: 2022-07-15

## 2022-08-11 RX ORDER — BUDESONIDE AND FORMOTEROL FUMARATE DIHYDRATE 160; 4.5 UG/1; UG/1
2 AEROSOL RESPIRATORY (INHALATION) 2 TIMES DAILY
COMMUNITY
Start: 2022-07-07

## 2022-08-11 NOTE — PROGRESS NOTES
Assessment/Plan:    Benign prostatic hyperplasia with weak urinary stream  The patient has a history of GreenLight laser and now with recurrent urinary symptoms as well as very elevated postvoid residuals  I am concerned about renal dysfunction from poor bladder emptying  The patient refuses catheter or CIC teaching  He is amenable to getting ultrasound to evaluate for hydronephrosis  Regarding treatment I think he likely needs a repeat outlet surgery  I recommend cystoscopy and transrectal ultrasound in workup for this but the patient refuses cystoscopy (I explained the risks including not finding important pathology such as a stricture)  He is amenable to getting transrectal ultrasound  He would prefer to have a repeat GreenLight laser surgery done  I will refer him to Dr Moncho Alston for this  Subjective:      Patient ID: Chaitanya Junior is a 66 y o  male  YOGI Doan is a 65 year old male with distant history of GreenLight laser now with elevated PVR and LUTS        He has history of BPH s/p green light laser photo vaporization in 2007 by Dr Antonio Chapa  this was done at the time for issues with recurrent UTIs and elevated postvoid residual   The patient apparently did well for quite some time but then began to have issues with UTI and voiding issues again  He voiced issues of a weaker stream and frequency  He denies dysuria, gross hematuria or incontinence  He was started on Tamsulosin at last appointment which he thinks has helped his voiding  At prior visit his PVR was 370 and did not have sensation of retention  Today his PVR was 590 cc  He refused catheterization at prior appointment as well as today  His renal function is stable with a creatinine of 1 2 which is his baseline  He denies flank pain  Past medical history significant for diabetes and cardiac disease with myocardial infarction and stent placement in 2016    PSA is low and stable      Surgical history significant for GreenLight laser as well as appendectomy and Mohs surgery  He is not on blood thinners other than aspirin 81mg  History reviewed  No pertinent surgical history  History reviewed  No pertinent past medical history  Review of Systems   Constitutional: Negative for chills and fever  HENT: Negative for ear pain and sore throat  Eyes: Negative for pain and visual disturbance  Respiratory: Negative for cough and shortness of breath  Cardiovascular: Negative for chest pain and palpitations  Gastrointestinal: Negative for abdominal pain and vomiting  Genitourinary: Positive for decreased urine volume, difficulty urinating and frequency  Negative for dysuria and hematuria  Musculoskeletal: Negative for arthralgias and back pain  Skin: Negative for color change and rash  Neurological: Negative for seizures and syncope  All other systems reviewed and are negative  Objective:      /76   Pulse (!) 51   Ht 5' 5" (1 651 m)   Wt 80 7 kg (178 lb)   BMI 29 62 kg/m²     Lab Results   Component Value Date    PSA 0 3 06/08/2022    PSA 0 6 05/05/2022    PSA 0 6 03/24/2022    PSA 0 5 01/06/2021    PSA 0 6 10/04/2019    PSA 0 4 06/07/2017          Physical Exam  Vitals reviewed  Constitutional:       Appearance: Normal appearance  He is normal weight  HENT:      Head: Normocephalic and atraumatic  Eyes:      Pupils: Pupils are equal, round, and reactive to light  Abdominal:      General: Abdomen is flat  Genitourinary:     Prostate: Normal       Comments: DAX 25g T1c  Neurological:      General: No focal deficit present  Mental Status: He is alert and oriented to person, place, and time  Psychiatric:         Mood and Affect: Mood normal          Thought Content:  Thought content normal            Orders  Orders Placed This Encounter   Procedures    US kidney and bladder with pvr     Standing Status:   Future     Standing Expiration Date:   8/11/2026 Scheduling Instructions:      "Prep required if being scheduled in conjunction with other studies, refer to those examination's Preps first before scheduling  Patient must drink 24 oz of water 60 minutes before your scheduled appointment time  This test requires you to have a FULL bladder  Please do not urinate 1 hour before your appointment time  Patient is not to urinate until their Ultrasound is completed  Bladder filling is a key part of this study and needsto be full for accurate imaging during this exam             Patients who have difficulty holding their urine may arrive 30 minutes in advance and finish drinking the required water before the scheduled test time  Again, do not empty the bladder until instructed to do so when your test is completed  You may take all of your medications for this test             Pediatric Preps-birth to 12 years             Infants and toddlers to 1 year of age- no drinking prep or restrictions             Toddlers (33 year old)- just give liquids , any clear liquid or juice, and hour prior to the exam             3year old and older- drink liquids (approx  16 to 24 oz and no soda) 30 minutes before, try to not let the child void until the test is completed             Please bring your insurance cards, a form of photo ID and a list of your medications with you  Arrive 15 minutes prior to your appointment time inorder to register  If you need to have lab work or a urinalysis, please do this AFTER your ultrasound  "     Order Specific Question:   Is a Renal Artery Doppler also being requested in addition to the Kidney/Renal ultrasound ? Answer:   No     Order Specific Question:   If you require a vascular assessment for a Renal/Kidney Transplant please order [id number] Renal Transplant Duplex study     Answer:   pt with poor emptying   please assess for hydronephrosis and check PVR    POCT Measure PVR

## 2022-08-11 NOTE — ASSESSMENT & PLAN NOTE
The patient has a history of GreenLight laser and now with recurrent urinary symptoms as well as very elevated postvoid residuals  I am concerned about renal dysfunction from poor bladder emptying  The patient refuses catheter or CIC teaching  He is amenable to getting ultrasound to evaluate for hydronephrosis  Regarding treatment I think he likely needs a repeat outlet surgery  I recommend cystoscopy and transrectal ultrasound in workup for this but the patient refuses cystoscopy (I explained the risks including not finding important pathology such as a stricture)  He is amenable to getting transrectal ultrasound  He would prefer to have a repeat GreenLight laser surgery done  I will refer him to   [facility] for this

## 2022-08-11 NOTE — Clinical Note
This patient would like a repeat GreenLight  He had GreenLight 15 years ago and now has very elevated postvoid residuals of 300-600cc (refuses CIC or catheter even cystoscopy)  I was going to TRUS him to make sure his prostate is a reasonable size although it feels small on DAX  Im also getting an US to look for hydro but his GFR is stable  He is scheduled for TRUS with me in 6 weeks (somehow that's my only availability even though I said he can be overbooked) and then to see you to discuss GreenLight but if you don't feel strongly about TRUS or can do it yourself his appt with you can probably be pushed up

## 2022-08-19 ENCOUNTER — HOSPITAL ENCOUNTER (OUTPATIENT)
Dept: ULTRASOUND IMAGING | Facility: HOSPITAL | Age: 72
Discharge: HOME | End: 2022-08-19
Attending: UROLOGY
Payer: MEDICARE

## 2022-08-19 DIAGNOSIS — N40.1 BENIGN PROSTATIC HYPERPLASIA WITH WEAK URINARY STREAM: ICD-10-CM

## 2022-08-19 DIAGNOSIS — R39.12 BENIGN PROSTATIC HYPERPLASIA WITH WEAK URINARY STREAM: ICD-10-CM

## 2022-08-19 DIAGNOSIS — R39.12 WEAK URINARY STREAM: ICD-10-CM

## 2022-08-19 PROCEDURE — 76770 US EXAM ABDO BACK WALL COMP: CPT

## 2022-08-30 ENCOUNTER — APPOINTMENT (OUTPATIENT)
Dept: LAB | Age: 72
End: 2022-08-30
Payer: MEDICARE

## 2022-08-30 DIAGNOSIS — E08.00 DIABETES MELLITUS DUE TO UNDERLYING CONDITION WITH HYPEROSMOLARITY WITHOUT COMA, WITHOUT LONG-TERM CURRENT USE OF INSULIN (HCC): ICD-10-CM

## 2022-08-30 DIAGNOSIS — E78.49 OTHER HYPERLIPIDEMIA: ICD-10-CM

## 2022-08-30 DIAGNOSIS — R53.83 OTHER FATIGUE: ICD-10-CM

## 2022-08-30 LAB
ALBUMIN SERPL BCP-MCNC: 3.7 G/DL (ref 3.5–5)
ALP SERPL-CCNC: 49 U/L (ref 46–116)
ALT SERPL W P-5'-P-CCNC: 30 U/L (ref 12–78)
ANION GAP SERPL CALCULATED.3IONS-SCNC: 6 MMOL/L (ref 4–13)
AST SERPL W P-5'-P-CCNC: 29 U/L (ref 5–45)
BASOPHILS # BLD AUTO: 0.04 THOUSANDS/ΜL (ref 0–0.1)
BASOPHILS NFR BLD AUTO: 1 % (ref 0–1)
BILIRUB SERPL-MCNC: 0.52 MG/DL (ref 0.2–1)
BUN SERPL-MCNC: 18 MG/DL (ref 5–25)
CALCIUM SERPL-MCNC: 9.3 MG/DL (ref 8.3–10.1)
CHLORIDE SERPL-SCNC: 111 MMOL/L (ref 96–108)
CHOLEST SERPL-MCNC: 97 MG/DL
CO2 SERPL-SCNC: 25 MMOL/L (ref 21–32)
CREAT SERPL-MCNC: 1.34 MG/DL (ref 0.6–1.3)
EOSINOPHIL # BLD AUTO: 0.2 THOUSAND/ΜL (ref 0–0.61)
EOSINOPHIL NFR BLD AUTO: 4 % (ref 0–6)
ERYTHROCYTE [DISTWIDTH] IN BLOOD BY AUTOMATED COUNT: 12.9 % (ref 11.6–15.1)
EST. AVERAGE GLUCOSE BLD GHB EST-MCNC: 131 MG/DL
GFR SERPL CREATININE-BSD FRML MDRD: 52 ML/MIN/1.73SQ M
GLUCOSE P FAST SERPL-MCNC: 138 MG/DL (ref 65–99)
HBA1C MFR BLD: 6.2 %
HCT VFR BLD AUTO: 39.7 % (ref 36.5–49.3)
HDLC SERPL-MCNC: 35 MG/DL
HGB BLD-MCNC: 13.8 G/DL (ref 12–17)
IMM GRANULOCYTES # BLD AUTO: 0.06 THOUSAND/UL (ref 0–0.2)
IMM GRANULOCYTES NFR BLD AUTO: 1 % (ref 0–2)
LDLC SERPL CALC-MCNC: 37 MG/DL (ref 0–100)
LYMPHOCYTES # BLD AUTO: 1.46 THOUSANDS/ΜL (ref 0.6–4.47)
LYMPHOCYTES NFR BLD AUTO: 31 % (ref 14–44)
MCH RBC QN AUTO: 34.1 PG (ref 26.8–34.3)
MCHC RBC AUTO-ENTMCNC: 34.8 G/DL (ref 31.4–37.4)
MCV RBC AUTO: 98 FL (ref 82–98)
MONOCYTES # BLD AUTO: 0.39 THOUSAND/ΜL (ref 0.17–1.22)
MONOCYTES NFR BLD AUTO: 8 % (ref 4–12)
NEUTROPHILS # BLD AUTO: 2.54 THOUSANDS/ΜL (ref 1.85–7.62)
NEUTS SEG NFR BLD AUTO: 55 % (ref 43–75)
NONHDLC SERPL-MCNC: 62 MG/DL
NRBC BLD AUTO-RTO: 0 /100 WBCS
PLATELET # BLD AUTO: 161 THOUSANDS/UL (ref 149–390)
PMV BLD AUTO: 11.5 FL (ref 8.9–12.7)
POTASSIUM SERPL-SCNC: 4.3 MMOL/L (ref 3.5–5.3)
PROT SERPL-MCNC: 7.2 G/DL (ref 6.4–8.4)
RBC # BLD AUTO: 4.05 MILLION/UL (ref 3.88–5.62)
SODIUM SERPL-SCNC: 142 MMOL/L (ref 135–147)
T4 FREE SERPL-MCNC: 0.93 NG/DL (ref 0.76–1.46)
TRIGL SERPL-MCNC: 126 MG/DL
TSH SERPL DL<=0.05 MIU/L-ACNC: 3.74 UIU/ML (ref 0.45–4.5)
WBC # BLD AUTO: 4.69 THOUSAND/UL (ref 4.31–10.16)

## 2022-08-30 PROCEDURE — 84443 ASSAY THYROID STIM HORMONE: CPT

## 2022-08-30 PROCEDURE — 80061 LIPID PANEL: CPT

## 2022-08-30 PROCEDURE — 85025 COMPLETE CBC W/AUTO DIFF WBC: CPT

## 2022-08-30 PROCEDURE — 84480 ASSAY TRIIODOTHYRONINE (T3): CPT

## 2022-08-30 PROCEDURE — 84439 ASSAY OF FREE THYROXINE: CPT

## 2022-08-30 PROCEDURE — 36415 COLL VENOUS BLD VENIPUNCTURE: CPT

## 2022-08-30 PROCEDURE — 83036 HEMOGLOBIN GLYCOSYLATED A1C: CPT

## 2022-08-30 PROCEDURE — 80053 COMPREHEN METABOLIC PANEL: CPT

## 2022-08-31 LAB — T3 SERPL-MCNC: 0.8 NG/ML (ref 0.6–1.8)

## 2022-09-29 ENCOUNTER — PROCEDURE VISIT (OUTPATIENT)
Dept: UROLOGY | Facility: AMBULATORY SURGERY CENTER | Age: 72
End: 2022-09-29

## 2022-09-29 VITALS
OXYGEN SATURATION: 99 % | SYSTOLIC BLOOD PRESSURE: 142 MMHG | BODY MASS INDEX: 29.82 KG/M2 | HEART RATE: 50 BPM | HEIGHT: 65 IN | WEIGHT: 179 LBS | DIASTOLIC BLOOD PRESSURE: 78 MMHG

## 2022-09-29 DIAGNOSIS — R39.12 BENIGN PROSTATIC HYPERPLASIA WITH WEAK URINARY STREAM: Primary | ICD-10-CM

## 2022-09-29 DIAGNOSIS — N40.1 BENIGN PROSTATIC HYPERPLASIA WITH WEAK URINARY STREAM: Primary | ICD-10-CM

## 2022-09-29 NOTE — Clinical Note
This pt had distant greenlight  Now with very elevated PVRs and obstructive sx and wants greenlight again ( I emailed you about him earlier)  Refuses cysto  TRUS volume not impressive at 30cc  We discussed possibility of urethral stricture being present    He sees you on Oct 4th

## 2022-09-29 NOTE — PROGRESS NOTES
Assessment/Plan:    Benign prostatic hyperplasia with weak urinary stream  The patient has a history of GreenLight laser and now with recurrent urinary symptoms as well as very elevated postvoid residuals  Fortunately RBUS shows no hydro and renal fxn is stable on blood work  The patient refuses catheter or CIC teaching  He also refuses cystoscopy  TRUS showed 30 gram prostate which at least raises the possibility of stricture rather than prostatic regrowth as etiology for his voiding issues but again the patient refuses cystoscopy to evaluate  We have previously discussed he likely needs a repeat outlet surgery  He would prefer to have a repeat GreenLight laser surgery done and is scheduled to see Dr Colan Landau on Oct 4th 2022  If he is is booked for [facility] surgery I would recommend that equipment be available for DVIU in the event urethral strictures discovered  The patient denied discussed this possibility  Subjective:      Patient ID: Claudeen Moris is a 67 y o  male  YOGI Bateman is a 65 year old male with distant history of GreenLight laser now with elevated PVR and LUTS          He has history of BPH s/p green light laser photo vaporization in 2007 by Dr Clem Love This was done at the time for issues with recurrent UTIs and elevated postvoid residual   The patient apparently did well for quite some time but then began to have issues with UTI and voiding issues again        He voiced issues of a weaker stream and frequency  He denies dysuria, gross hematuria or incontinence  He was started on Tamsulosin which he thinks has helped his voiding         PVRs in 2022 have been in range of 200 to 592cc with average of 400cc  He has refused catheterizations  Also refused cystoscopy  He was amenable TRUS to measure prsotate size which was 30g      His renal function is stable with a creatinine of 1 3 which is his baseline  He denies flank pain   RBUS Aug 19 2022 did not show any evidence of hydronephrosis but had PVR of 440cc        Past medical history significant for diabetes and cardiac disease with myocardial infarction and stent placement in 2016     PSA is low and stable      Surgical history significant for GreenLight laser as well as appendectomy and Mohs surgery      He is not on blood thinners other than aspirin 81mg  History reviewed  No pertinent surgical history  History reviewed  No pertinent past medical history  AUA SYMPTOM SCORE    Flowsheet Row Most Recent Value   AUA SYMPTOM SCORE    How often have you had a sensation of not emptying your bladder completely after you finished urinating? 2 (P)     How often have you had to urinate again less than two hours after you finished urinating? 2 (P)     How often have you found you stopped and started again several times when you urinate? 2 (P)     How often have you found it difficult to postpone urination? 1 (P)     How often have you had a weak urinary stream? 5 (P)     How often have you had to push or strain to begin urination? 1 (P)     How many times did you most typically get up to urinate from the time you went to bed at night until the time you got up in the morning? 2 (P)     Quality of Life: If you were to spend the rest of your life with your urinary condition just the way it is now, how would you feel about that? 4 (P)     AUA SYMPTOM SCORE 15 (P)            Review of Systems   Constitutional: Negative for chills and fever  HENT: Negative for ear pain and sore throat  Eyes: Negative for pain and visual disturbance  Respiratory: Negative for cough and shortness of breath  Cardiovascular: Negative for chest pain and palpitations  Gastrointestinal: Negative for abdominal pain and vomiting  Genitourinary: Positive for difficulty urinating  Negative for dysuria and hematuria  Musculoskeletal: Negative for arthralgias and back pain  Skin: Negative for color change and rash     Neurological: Negative for seizures and syncope  All other systems reviewed and are negative  Objective:      /78 (BP Location: Left arm, Patient Position: Sitting, Cuff Size: Adult)   Pulse (!) 50   Ht 5' 5" (1 651 m)   Wt 81 2 kg (179 lb)   SpO2 99%   BMI 29 79 kg/m²     Lab Results   Component Value Date    PSA 0 3 06/08/2022    PSA 0 6 05/05/2022    PSA 0 6 03/24/2022    PSA 0 5 01/06/2021    PSA 0 6 10/04/2019    PSA 0 4 06/07/2017          Physical Exam  Vitals reviewed  Constitutional:       Appearance: Normal appearance  He is normal weight  HENT:      Head: Normocephalic and atraumatic  Eyes:      Pupils: Pupils are equal, round, and reactive to light  Abdominal:      General: Abdomen is flat  Neurological:      General: No focal deficit present  Mental Status: He is alert and oriented to person, place, and time  Psychiatric:         Mood and Affect: Mood normal          Thought Content: Thought content normal              RENAL ULTRASOUND WITH PVR     INDICATION:   R39 12: Poor urinary stream  N40 1: Benign prostatic hyperplasia with lower urinary tract symptoms  R39 12: Poor urinary stream      COMPARISON: None     TECHNIQUE:   Ultrasound of the retroperitoneum was performed with a curvilinear transducer utilizing volumetric sweeps and still imaging techniques       FINDINGS:     KIDNEYS:  Symmetric and normal size  Right kidney:  10 8 x 6 0 x 5 6 cm  Volume 190 7 mL  Left kidney:  11 3 x 7 0 x 4 6 cm  Volume 190 9 mL     Right kidney  Normal echogenicity and contour  No mass is identified  There is a 2 4 x 1 7 x 1 8 cm mid pole simple cyst seen  Additional 1 2 x 1 3 x 1 2 cm cyst in the medial mid to lower pole is also seen  No hydronephrosis  No shadowing calculi  No perinephric fluid collections      Left kidney  Normal echogenicity and contour  No mass is identified   There is a 0 8 x 0 7 x 0 8 cm simple cyst in the midpole and 0 5 x 0 6 x 0 4 cm simple cyst in the lower pole seen  No hydronephrosis  No shadowing calculi  No perinephric fluid collections      URETERS:  Nonvisualized      BLADDER:   Normally distended  No focal thickening or mass lesions  Bilateral ureteral jets detected  Prevoid: 819 7   No significant post void volume  Measured post void volume in mL: 443  3        IMPRESSION:     A few small bilateral renal cysts are seen  No focal mass, nephrolithiasis, or hydronephrosis bilaterally  Significant (443 mL) post void residual bladder volume noted  Biopsy prostate     Date/Time 9/29/2022 10:45 AM     Performed by  Dian Peraza MD     Authorized by Dian Peraza MD      Universal Protocol   Consent: Written consent obtained  Consent given by: patient  Time out: Immediately prior to procedure a "time out" was called to verify the correct patient, procedure, equipment, support staff and site/side marked as required  Patient understanding: patient states understanding of the procedure being performed  Patient consent: the patient's understanding of the procedure matches consent given  Procedure consent: procedure consent matches procedure scheduled  Relevant documents: relevant documents present and verified  Patient identity confirmed: verbally with patient        Local anesthesia used: no     Anesthesia   Local anesthesia used: no     Sedation   Patient sedated: no        Specimen: no   Procedure Details   Procedure Notes: The patient was placed in left lateral decubitus position  A digital rectal examination was performed  The prostate did not have any nodules  An ultrasound probe was introduced into the rectum  The prostate was evaluated and measurements made showing the prostate to be  30 cc in size    Patient tolerance: patient tolerated the procedure well with no immediate complications             Orders  Orders Placed This Encounter   Procedures    Biopsy prostate     This order was created via procedure documentation

## 2022-09-29 NOTE — ASSESSMENT & PLAN NOTE
The patient has a history of GreenLight laser and now with recurrent urinary symptoms as well as very elevated postvoid residuals  Fortunately RBUS shows no hydro and renal fxn is stable on blood work  The patient refuses catheter or CIC teaching  He also refuses cystoscopy  TRUS showed 30 gram prostate which at least raises the possibility of stricture rather than prostatic regrowth as etiology for his voiding issues but again the patient refuses cystoscopy to evaluate  We have previously discussed he likely needs a repeat outlet surgery  He would prefer to have a repeat GreenLight laser surgery done and is scheduled to see Dr Benito Shafer on Oct 4th 2022  If he is is booked for [facility] surgery I would recommend that equipment be available for DVIU in the event urethral strictures discovered  The patient denied discussed this possibility

## 2022-10-04 ENCOUNTER — OFFICE VISIT (OUTPATIENT)
Dept: UROLOGY | Facility: MEDICAL CENTER | Age: 72
End: 2022-10-04
Payer: MEDICARE

## 2022-10-04 VITALS
SYSTOLIC BLOOD PRESSURE: 118 MMHG | HEART RATE: 55 BPM | DIASTOLIC BLOOD PRESSURE: 76 MMHG | WEIGHT: 180 LBS | OXYGEN SATURATION: 99 % | HEIGHT: 65 IN | BODY MASS INDEX: 29.99 KG/M2

## 2022-10-04 DIAGNOSIS — N18.30 STAGE 3 CHRONIC KIDNEY DISEASE, UNSPECIFIED WHETHER STAGE 3A OR 3B CKD (HCC): ICD-10-CM

## 2022-10-04 DIAGNOSIS — I25.2 HISTORY OF ACUTE ANTERIOR WALL MI: ICD-10-CM

## 2022-10-04 DIAGNOSIS — R39.12 BENIGN PROSTATIC HYPERPLASIA WITH WEAK URINARY STREAM: Primary | ICD-10-CM

## 2022-10-04 DIAGNOSIS — E11.9 TYPE 2 DIABETES MELLITUS WITHOUT COMPLICATION, WITHOUT LONG-TERM CURRENT USE OF INSULIN (HCC): ICD-10-CM

## 2022-10-04 DIAGNOSIS — Z95.5 HISTORY OF CORONARY ARTERY STENT PLACEMENT: ICD-10-CM

## 2022-10-04 DIAGNOSIS — I10 HYPERTENSION, ACCELERATED: ICD-10-CM

## 2022-10-04 DIAGNOSIS — N40.1 BENIGN PROSTATIC HYPERPLASIA WITH WEAK URINARY STREAM: Primary | ICD-10-CM

## 2022-10-04 PROBLEM — I25.10 CORONARY ARTERY DISEASE INVOLVING NATIVE CORONARY ARTERY OF NATIVE HEART WITHOUT ANGINA PECTORIS: Status: ACTIVE | Noted: 2019-10-18

## 2022-10-04 PROBLEM — E78.2 MIXED HYPERLIPIDEMIA: Status: ACTIVE | Noted: 2019-10-18

## 2022-10-04 PROCEDURE — 99214 OFFICE O/P EST MOD 30 MIN: CPT | Performed by: UROLOGY

## 2022-10-04 NOTE — ASSESSMENT & PLAN NOTE
AUA symptom score is 18  He has failed medical therapy  He is now 15 years post GreenLight laser  He declines outpatient cystoscopy for further diagnosis  He does wish to proceed with repeat photoselective vaporization of the prostate with the high-powered GreenLight laser  The procedure was described  Risks were discussed and consent form signed  He understands that other pathology including stricture or decompensated bladder are possibilities  No guarantee regarding improvement was made

## 2022-10-04 NOTE — PROGRESS NOTES
Assessment/Plan:    Benign prostatic hyperplasia with weak urinary stream  AUA symptom score is 18  He has failed medical therapy  He is now 15 years post GreenLight laser  He declines outpatient cystoscopy for further diagnosis  He does wish to proceed with repeat photoselective vaporization of the prostate with the high-powered GreenLight laser  The procedure was described  Risks were discussed and consent form signed  He understands that other pathology including stricture or decompensated bladder are possibilities  No guarantee regarding improvement was made  Diagnoses and all orders for this visit:    Benign prostatic hyperplasia with weak urinary stream  -     Case request operating room: TRANSURETHRAL RESECTION OF PROSTATE W/ LASER; Standing  -     EKG 12 lead; Future  -     Urine culture; Future  -     Case request operating room: TRANSURETHRAL RESECTION OF PROSTATE W/ LASER    Stage 3 chronic kidney disease, unspecified whether stage 3a or 3b CKD (HCC)    History of acute anterior wall MI    History of coronary artery stent placement    Hypertension, accelerated    Type 2 diabetes mellitus without complication, without long-term current use of insulin ([facility] [address] )    Other orders  -     Diet NPO; Sips with meds; Standing  -     Place sequential compression device; Standing          Subjective:      Patient ID: Mala Rodriguez is a 67 y o  male  Benign Prostatic Hypertrophy  This is a chronic problem  The current episode started more than 1 year ago  The problem has been rapidly worsening since onset  Irritative symptoms include frequency  Irritative symptoms do not include nocturia or urgency  Obstructive symptoms include dribbling, incomplete emptying, an intermittent stream, a slower stream and a weak stream  Obstructive symptoms do not include straining  Associated symptoms include hesitancy  Pertinent negatives include no chills, dysuria, genital pain or hematuria  AUA score is 8-19   He is not sexually active  Nothing aggravates the symptoms  Past treatments include finasteride and tamsulosin  The treatment provided no relief (He underwent photoselective vaporization the prostate with the GreenLight laser in 2007  )  The GreenLight laser worked very well for the last 15 years but over the last 6 months his symptoms have been worsening  He is interested in repeat photoselective vaporization the prostate with GreenLight laser  The following portions of the patient's history were reviewed and updated as appropriate: allergies, current medications, past family history, past medical history, past social history, past surgical history and problem list     Review of Systems   Constitutional: Negative for chills, diaphoresis, fatigue and fever  HENT: Negative  Eyes: Negative  Respiratory: Positive for shortness of breath  Cardiovascular: Negative  Negative for chest pain and leg swelling  Gastrointestinal: Negative  Endocrine: Negative  Genitourinary: Positive for frequency, hesitancy and incomplete emptying  Negative for dysuria, hematuria, nocturia and urgency  See HPI   Musculoskeletal: Negative  Skin: Negative  Allergic/Immunologic: Negative  Neurological: Negative  Hematological: Negative  Psychiatric/Behavioral: Negative          AUA SYMPTOM SCORE    Flowsheet Row Most Recent Value   AUA SYMPTOM SCORE    How often have you had a sensation of not emptying your bladder completely after you finished urinating? 3 (P)     How often have you had to urinate again less than two hours after you finished urinating? 3 (P)     How often have you found you stopped and started again several times when you urinate? 2 (P)     How often have you found it difficult to postpone urination? 1 (P)     How often have you had a weak urinary stream? 5 (P)     How often have you had to push or strain to begin urination? 3 (P)     How many times did you most typically get up to urinate from the time you went to bed at night until the time you got up in the morning? 1 (P)     Quality of Life: If you were to spend the rest of your life with your urinary condition just the way it is now, how would you feel about that? 5 (P)     AUA SYMPTOM SCORE 18 (P)         Objective:      /76   Pulse 55   Ht 5' 5" (1 651 m)   Wt 81 6 kg (180 lb)   SpO2 99%   BMI 29 95 kg/m²          Physical Exam  Constitutional:       General: He is not in acute distress  Appearance: Normal appearance  He is well-developed and normal weight  He is not ill-appearing, toxic-appearing or diaphoretic  HENT:      Head: Normocephalic and atraumatic  Eyes:      General: No scleral icterus  Conjunctiva/sclera: Conjunctivae normal    Cardiovascular:      Rate and Rhythm: Normal rate  Pulmonary:      Effort: Pulmonary effort is normal    Abdominal:      General: There is no distension  Palpations: There is no mass  Tenderness: There is no abdominal tenderness  There is no right CVA tenderness, left CVA tenderness, guarding or rebound  Hernia: No hernia is present  Comments: No hernia   Genitourinary:     Penis: Normal        Testes: Normal    Musculoskeletal:      Cervical back: Neck supple  Skin:     General: Skin is warm and dry  Neurological:      General: No focal deficit present  Mental Status: He is alert and oriented to person, place, and time  Psychiatric:         Mood and Affect: Mood normal          Behavior: Behavior normal          Thought Content:  Thought content normal          Judgment: Judgment normal

## 2022-10-06 ENCOUNTER — TELEPHONE (OUTPATIENT)
Dept: UROLOGY | Facility: MEDICAL CENTER | Age: 72
End: 2022-10-06

## 2022-10-06 NOTE — TELEPHONE ENCOUNTER
Called and left message for patient to give office a call back to schedule surgery  Office number left in message

## 2022-11-28 ENCOUNTER — APPOINTMENT (OUTPATIENT)
Dept: LAB | Age: 72
End: 2022-11-28

## 2022-11-28 ENCOUNTER — OFFICE VISIT (OUTPATIENT)
Dept: LAB | Age: 72
End: 2022-11-28

## 2022-11-28 DIAGNOSIS — Z01.812 PRE-OPERATIVE LABORATORY EXAMINATION: ICD-10-CM

## 2022-11-28 DIAGNOSIS — R39.12 BENIGN PROSTATIC HYPERPLASIA WITH WEAK URINARY STREAM: ICD-10-CM

## 2022-11-28 DIAGNOSIS — N40.1 BENIGN PROSTATIC HYPERPLASIA WITH WEAK URINARY STREAM: ICD-10-CM

## 2022-11-28 LAB
ALBUMIN SERPL BCP-MCNC: 4 G/DL (ref 3.5–5)
ALP SERPL-CCNC: 54 U/L (ref 46–116)
ALT SERPL W P-5'-P-CCNC: 28 U/L (ref 12–78)
ANION GAP SERPL CALCULATED.3IONS-SCNC: 5 MMOL/L (ref 4–13)
AST SERPL W P-5'-P-CCNC: 26 U/L (ref 5–45)
BASOPHILS # BLD AUTO: 0.03 THOUSANDS/ÂΜL (ref 0–0.1)
BASOPHILS NFR BLD AUTO: 1 % (ref 0–1)
BILIRUB SERPL-MCNC: 0.77 MG/DL (ref 0.2–1)
BUN SERPL-MCNC: 20 MG/DL (ref 5–25)
CALCIUM SERPL-MCNC: 10 MG/DL (ref 8.3–10.1)
CHLORIDE SERPL-SCNC: 109 MMOL/L (ref 96–108)
CO2 SERPL-SCNC: 26 MMOL/L (ref 21–32)
CREAT SERPL-MCNC: 1.23 MG/DL (ref 0.6–1.3)
EOSINOPHIL # BLD AUTO: 0.2 THOUSAND/ÂΜL (ref 0–0.61)
EOSINOPHIL NFR BLD AUTO: 4 % (ref 0–6)
ERYTHROCYTE [DISTWIDTH] IN BLOOD BY AUTOMATED COUNT: 12.4 % (ref 11.6–15.1)
GFR SERPL CREATININE-BSD FRML MDRD: 57 ML/MIN/1.73SQ M
GLUCOSE P FAST SERPL-MCNC: 140 MG/DL (ref 65–99)
HCT VFR BLD AUTO: 43.6 % (ref 36.5–49.3)
HGB BLD-MCNC: 15.2 G/DL (ref 12–17)
IMM GRANULOCYTES # BLD AUTO: 0.05 THOUSAND/UL (ref 0–0.2)
IMM GRANULOCYTES NFR BLD AUTO: 1 % (ref 0–2)
LYMPHOCYTES # BLD AUTO: 1.72 THOUSANDS/ÂΜL (ref 0.6–4.47)
LYMPHOCYTES NFR BLD AUTO: 30 % (ref 14–44)
MCH RBC QN AUTO: 34.2 PG (ref 26.8–34.3)
MCHC RBC AUTO-ENTMCNC: 34.9 G/DL (ref 31.4–37.4)
MCV RBC AUTO: 98 FL (ref 82–98)
MONOCYTES # BLD AUTO: 0.52 THOUSAND/ÂΜL (ref 0.17–1.22)
MONOCYTES NFR BLD AUTO: 9 % (ref 4–12)
NEUTROPHILS # BLD AUTO: 3.16 THOUSANDS/ÂΜL (ref 1.85–7.62)
NEUTS SEG NFR BLD AUTO: 55 % (ref 43–75)
NRBC BLD AUTO-RTO: 0 /100 WBCS
PLATELET # BLD AUTO: 172 THOUSANDS/UL (ref 149–390)
PMV BLD AUTO: 12 FL (ref 8.9–12.7)
POTASSIUM SERPL-SCNC: 4.5 MMOL/L (ref 3.5–5.3)
PROT SERPL-MCNC: 7.1 G/DL (ref 6.4–8.4)
RBC # BLD AUTO: 4.45 MILLION/UL (ref 3.88–5.62)
SODIUM SERPL-SCNC: 140 MMOL/L (ref 135–147)
WBC # BLD AUTO: 5.68 THOUSAND/UL (ref 4.31–10.16)

## 2022-11-29 LAB
ATRIAL RATE: 56 BPM
BACTERIA UR CULT: ABNORMAL
P AXIS: 36 DEGREES
PR INTERVAL: 194 MS
QRS AXIS: -42 DEGREES
QRSD INTERVAL: 114 MS
QT INTERVAL: 468 MS
QTC INTERVAL: 451 MS
T WAVE AXIS: 136 DEGREES
VENTRICULAR RATE: 56 BPM

## 2022-12-01 ENCOUNTER — ANESTHESIA EVENT (OUTPATIENT)
Dept: PERIOP | Facility: HOSPITAL | Age: 72
End: 2022-12-01

## 2022-12-07 RX ORDER — GLIMEPIRIDE 2 MG/1
2 TABLET ORAL
COMMUNITY
Start: 2022-11-10

## 2022-12-07 NOTE — PRE-PROCEDURE INSTRUCTIONS
Pre-Surgery Instructions:   Medication Instructions   • albuterol (PROVENTIL HFA,VENTOLIN HFA) 90 mcg/act inhaler Uses PRN- OK to take day of surgery   • amLODIPine (NORVASC) 2 5 mg tablet Take day of surgery  • ASPIRIN 81 PO Instructions provided by MD stopped   • budesonide-formoterol (SYMBICORT) 160-4 5 mcg/act inhaler Uses PRN- OK to take day of surgery   • Cholecalciferol (VITAMIN D3 PO) Stop taking   • Choline Fenofibrate (FENOFIBRIC ACID) 135 MG CPDR Take day of surgery  • dexlansoprazole (DEXILANT) 60 MG capsule Take day of surgery  • fluticasone (Arnuity Ellipta) 100 MCG/ACT AEPB inhaler Uses PRN- OK to take day of surgery   • glimepiride (AMARYL) 2 mg tablet Hold day of surgery  • lisinopril (ZESTRIL) 10 mg tablet Hold day of surgery  • Menaquinone-7 (VITAMIN K2 PO) Stop taking   • metoprolol tartrate (LOPRESSOR) 50 mg tablet Take day of surgery  • multivitamin (THERAGRAN) TABS Stop taking   • rosuvastatin (CRESTOR) 20 MG tablet Take day of surgery  • Semaglutide (Rybelsus) 7 MG TABS Hold day of surgery  • SITagliptin-metFORMIN HCl -1000 MG TB24 Hold day of surgery  Have you had / have a sore throat? No  Have you had / have a cough less than 1 week? No  Have you had / have a fever greater than 100 0 - 100  4? No  Are you experiencing any shortness of breath? No    Review with patient via phone medications and showering instructions  Instructed to avoid all OTC Vit/Supp prior to surgery and to avoid NSAIDs 3 days prior to surgery per anesthesia instructions  Tylenol ok to take prn  [facility] call with surgery schedule time, nothing eat or drink after midnight  Verbalized understanding

## 2022-12-09 NOTE — H&P
Assessment/Plan:  Benign prostatic hyperplasia with weak urinary stream  AUA symptom score is 18  He has failed medical therapy  He is now 15 years post GreenLight laser  He declines outpatient cystoscopy for further diagnosis  Prostate is 30 g on ultrasound  He does wish to proceed with repeat photoselective vaporization of the prostate with the high-powered GreenLight laser  The procedure was described  Risks were discussed and consent form signed  He understands that other pathology including stricture or decompensated bladder are possibilities  No guarantee regarding improvement was made  Diagnoses and all orders for this visit:    Benign prostatic hyperplasia with weak urinary stream  -     Case request operating room: TRANSURETHRAL RESECTION OF PROSTATE W/ LASER; Standing  -     EKG 12 lead; Future  -     Urine culture; Future  -     Case request operating room: TRANSURETHRAL RESECTION OF PROSTATE W/ LASER    Stage 3 chronic kidney disease, unspecified whether stage 3a or 3b CKD (HCC)    History of acute anterior wall MI    History of coronary artery stent placement    Hypertension, accelerated    Type 2 diabetes mellitus without complication, without long-term current use of insulin ([address] )    Other orders  -     Diet NPO; Sips with meds; Standing  -     Place sequential compression device; Standing          Subjective:      Patient ID: Cydnjes Davis is a 67 y o  male  Benign Prostatic Hypertrophy  This is a chronic problem  The current episode started more than 1 year ago  The problem has been rapidly worsening since onset  Irritative symptoms include frequency  Irritative symptoms do not include nocturia or urgency  Obstructive symptoms include dribbling, incomplete emptying, an intermittent stream, a slower stream and a weak stream  Obstructive symptoms do not include straining  Associated symptoms include hesitancy  Pertinent negatives include no chills, dysuria, genital pain or hematuria  AUA score is 8-19  He is not sexually active  Nothing aggravates the symptoms  Past treatments include finasteride and tamsulosin  The treatment provided no relief (He underwent photoselective vaporization the prostate with the GreenLight laser in 2007  )  The GreenLight laser worked very well for the last 15 years but over the last 6 months his symptoms have been worsening  He is interested in repeat photoselective vaporization the prostate with GreenLight laser  The following portions of the patient's history were reviewed and updated as appropriate: allergies, current medications, past family history, past medical history, past social history, past surgical history and problem list     Review of Systems   Constitutional: Negative for chills, diaphoresis, fatigue and fever  HENT: Negative  Eyes: Negative  Respiratory: Positive for shortness of breath  Cardiovascular: Negative  Negative for chest pain and leg swelling  Gastrointestinal: Negative  Endocrine: Negative  Genitourinary: Positive for frequency, hesitancy and incomplete emptying  Negative for dysuria, hematuria, nocturia and urgency  See HPI   Musculoskeletal: Negative  Skin: Negative  Allergic/Immunologic: Negative  Neurological: Negative  Hematological: Negative  Psychiatric/Behavioral: Negative          AUA SYMPTOM SCORE    Flowsheet Row Most Recent Value   AUA SYMPTOM SCORE    How often have you had a sensation of not emptying your bladder completely after you finished urinating? 3 (P)     How often have you had to urinate again less than two hours after you finished urinating? 3 (P)     How often have you found you stopped and started again several times when you urinate? 2 (P)     How often have you found it difficult to postpone urination? 1 (P)     How often have you had a weak urinary stream? 5 (P)     How often have you had to push or strain to begin urination? 3 (P)     How many times did you most typically get up to urinate from the time you went to bed at night until the time you got up in the morning? 1 (P)     Quality of Life: If you were to spend the rest of your life with your urinary condition just the way it is now, how would you feel about that? 5 (P)     AUA SYMPTOM SCORE 18 (P)         Objective:      /78   Pulse 57   Temp 98 6 °F (37 °C) (Temporal)   Resp 16   SpO2 97%          Physical Exam  Constitutional:       General: He is not in acute distress  Appearance: Normal appearance  He is well-developed and normal weight  He is not ill-appearing, toxic-appearing or diaphoretic  HENT:      Head: Normocephalic and atraumatic  Eyes:      General: No scleral icterus  Conjunctiva/sclera: Conjunctivae normal    Cardiovascular:      Rate and Rhythm: Normal rate  Pulmonary:      Effort: Pulmonary effort is normal    Abdominal:      General: There is no distension  Palpations: There is no mass  Tenderness: There is no abdominal tenderness  There is no right CVA tenderness, left CVA tenderness, guarding or rebound  Hernia: No hernia is present  Comments: No hernia   Genitourinary:     Penis: Normal        Testes: Normal    Musculoskeletal:      Cervical back: Neck supple  Skin:     General: Skin is warm and dry  Neurological:      General: No focal deficit present  Mental Status: He is alert and oriented to person, place, and time  Psychiatric:         Mood and Affect: Mood normal          Behavior: Behavior normal          Thought Content:  Thought content normal          Judgment: Judgment normal

## 2022-12-12 ENCOUNTER — HOSPITAL ENCOUNTER (OUTPATIENT)
Facility: HOSPITAL | Age: 72
Setting detail: OUTPATIENT SURGERY
Discharge: HOME | End: 2022-12-12
Attending: UROLOGY | Admitting: UROLOGY

## 2022-12-12 ENCOUNTER — TELEPHONE (OUTPATIENT)
Dept: UROLOGY | Facility: MEDICAL CENTER | Age: 72
End: 2022-12-12

## 2022-12-12 ENCOUNTER — ANESTHESIA (OUTPATIENT)
Dept: PERIOP | Facility: HOSPITAL | Age: 72
End: 2022-12-12

## 2022-12-12 VITALS
DIASTOLIC BLOOD PRESSURE: 78 MMHG | OXYGEN SATURATION: 94 % | RESPIRATION RATE: 16 BRPM | SYSTOLIC BLOOD PRESSURE: 139 MMHG | TEMPERATURE: 98.9 F | HEART RATE: 68 BPM

## 2022-12-12 DIAGNOSIS — R39.12 BENIGN PROSTATIC HYPERPLASIA WITH WEAK URINARY STREAM: Primary | ICD-10-CM

## 2022-12-12 DIAGNOSIS — N40.1 BENIGN PROSTATIC HYPERPLASIA WITH WEAK URINARY STREAM: Primary | ICD-10-CM

## 2022-12-12 LAB
GLUCOSE SERPL-MCNC: 120 MG/DL (ref 65–140)
GLUCOSE SERPL-MCNC: 124 MG/DL (ref 65–140)

## 2022-12-12 RX ORDER — METOCLOPRAMIDE HYDROCHLORIDE 5 MG/ML
10 INJECTION INTRAMUSCULAR; INTRAVENOUS ONCE AS NEEDED
Status: DISCONTINUED | OUTPATIENT
Start: 2022-12-12 | End: 2022-12-12 | Stop reason: HOSPADM

## 2022-12-12 RX ORDER — FENTANYL CITRATE 50 UG/ML
INJECTION, SOLUTION INTRAMUSCULAR; INTRAVENOUS AS NEEDED
Status: DISCONTINUED | OUTPATIENT
Start: 2022-12-12 | End: 2022-12-12

## 2022-12-12 RX ORDER — SODIUM CHLORIDE 9 MG/ML
INJECTION, SOLUTION INTRAVENOUS AS NEEDED
Status: DISCONTINUED | OUTPATIENT
Start: 2022-12-12 | End: 2022-12-12 | Stop reason: HOSPADM

## 2022-12-12 RX ORDER — PHENAZOPYRIDINE HYDROCHLORIDE 100 MG/1
200 TABLET, FILM COATED ORAL
Status: DISCONTINUED | OUTPATIENT
Start: 2022-12-12 | End: 2022-12-12 | Stop reason: HOSPADM

## 2022-12-12 RX ORDER — OXYCODONE HYDROCHLORIDE 5 MG/1
5 TABLET ORAL EVERY 4 HOURS PRN
Status: DISCONTINUED | OUTPATIENT
Start: 2022-12-12 | End: 2022-12-12 | Stop reason: HOSPADM

## 2022-12-12 RX ORDER — CEFAZOLIN SODIUM 2 G/50ML
2000 SOLUTION INTRAVENOUS ONCE
Status: COMPLETED | OUTPATIENT
Start: 2022-12-12 | End: 2022-12-12

## 2022-12-12 RX ORDER — PROPOFOL 10 MG/ML
INJECTION, EMULSION INTRAVENOUS AS NEEDED
Status: DISCONTINUED | OUTPATIENT
Start: 2022-12-12 | End: 2022-12-12

## 2022-12-12 RX ORDER — ONDANSETRON 2 MG/ML
INJECTION INTRAMUSCULAR; INTRAVENOUS AS NEEDED
Status: DISCONTINUED | OUTPATIENT
Start: 2022-12-12 | End: 2022-12-12

## 2022-12-12 RX ORDER — CEPHALEXIN 500 MG/1
500 CAPSULE ORAL EVERY 12 HOURS SCHEDULED
Qty: 10 CAPSULE | Refills: 0 | Status: SHIPPED | OUTPATIENT
Start: 2022-12-12 | End: 2022-12-17

## 2022-12-12 RX ORDER — EPHEDRINE SULFATE 50 MG/ML
INJECTION INTRAVENOUS AS NEEDED
Status: DISCONTINUED | OUTPATIENT
Start: 2022-12-12 | End: 2022-12-12

## 2022-12-12 RX ORDER — ACETAMINOPHEN 325 MG/1
650 TABLET ORAL EVERY 6 HOURS PRN
Status: DISCONTINUED | OUTPATIENT
Start: 2022-12-12 | End: 2022-12-12 | Stop reason: HOSPADM

## 2022-12-12 RX ORDER — ONDANSETRON 2 MG/ML
4 INJECTION INTRAMUSCULAR; INTRAVENOUS ONCE AS NEEDED
Status: DISCONTINUED | OUTPATIENT
Start: 2022-12-12 | End: 2022-12-12 | Stop reason: HOSPADM

## 2022-12-12 RX ORDER — FUROSEMIDE 10 MG/ML
INJECTION INTRAMUSCULAR; INTRAVENOUS AS NEEDED
Status: DISCONTINUED | OUTPATIENT
Start: 2022-12-12 | End: 2022-12-12

## 2022-12-12 RX ORDER — SODIUM CHLORIDE, SODIUM LACTATE, POTASSIUM CHLORIDE, CALCIUM CHLORIDE 600; 310; 30; 20 MG/100ML; MG/100ML; MG/100ML; MG/100ML
100 INJECTION, SOLUTION INTRAVENOUS CONTINUOUS
Status: DISCONTINUED | OUTPATIENT
Start: 2022-12-12 | End: 2022-12-12 | Stop reason: HOSPADM

## 2022-12-12 RX ORDER — HYDROMORPHONE HCL/PF 1 MG/ML
0.25 SYRINGE (ML) INJECTION
Status: DISCONTINUED | OUTPATIENT
Start: 2022-12-12 | End: 2022-12-12 | Stop reason: HOSPADM

## 2022-12-12 RX ORDER — PHENAZOPYRIDINE HYDROCHLORIDE 200 MG/1
200 TABLET, FILM COATED ORAL 3 TIMES DAILY PRN
Qty: 10 TABLET | Refills: 0 | Status: SHIPPED | OUTPATIENT
Start: 2022-12-12

## 2022-12-12 RX ORDER — LIDOCAINE HYDROCHLORIDE 10 MG/ML
INJECTION, SOLUTION EPIDURAL; INFILTRATION; INTRACAUDAL; PERINEURAL AS NEEDED
Status: DISCONTINUED | OUTPATIENT
Start: 2022-12-12 | End: 2022-12-12

## 2022-12-12 RX ORDER — MIDAZOLAM HYDROCHLORIDE 2 MG/2ML
INJECTION, SOLUTION INTRAMUSCULAR; INTRAVENOUS AS NEEDED
Status: DISCONTINUED | OUTPATIENT
Start: 2022-12-12 | End: 2022-12-12

## 2022-12-12 RX ORDER — FENTANYL CITRATE/PF 50 MCG/ML
50 SYRINGE (ML) INJECTION
Status: DISCONTINUED | OUTPATIENT
Start: 2022-12-12 | End: 2022-12-12 | Stop reason: HOSPADM

## 2022-12-12 RX ORDER — ONDANSETRON 2 MG/ML
4 INJECTION INTRAMUSCULAR; INTRAVENOUS EVERY 6 HOURS PRN
Status: DISCONTINUED | OUTPATIENT
Start: 2022-12-12 | End: 2022-12-12 | Stop reason: HOSPADM

## 2022-12-12 RX ORDER — ATROPA BELLADONNA AND OPIUM 16.2; 3 MG/1; MG/1
1 SUPPOSITORY RECTAL EVERY 6 HOURS PRN
Status: DISCONTINUED | OUTPATIENT
Start: 2022-12-12 | End: 2022-12-12 | Stop reason: HOSPADM

## 2022-12-12 RX ADMIN — EPHEDRINE SULFATE 10 MG: 50 INJECTION INTRAVENOUS at 13:38

## 2022-12-12 RX ADMIN — FENTANYL CITRATE 25 MCG: 50 INJECTION, SOLUTION INTRAMUSCULAR; INTRAVENOUS at 13:33

## 2022-12-12 RX ADMIN — CEFAZOLIN SODIUM 2000 MG: 2 SOLUTION INTRAVENOUS at 13:12

## 2022-12-12 RX ADMIN — SODIUM CHLORIDE, SODIUM LACTATE, POTASSIUM CHLORIDE, AND CALCIUM CHLORIDE: .6; .31; .03; .02 INJECTION, SOLUTION INTRAVENOUS at 13:00

## 2022-12-12 RX ADMIN — ACETAMINOPHEN 650 MG: 325 TABLET ORAL at 16:17

## 2022-12-12 RX ADMIN — FENTANYL CITRATE 25 MCG: 50 INJECTION, SOLUTION INTRAMUSCULAR; INTRAVENOUS at 13:29

## 2022-12-12 RX ADMIN — LIDOCAINE HYDROCHLORIDE 50 MG: 10 INJECTION, SOLUTION EPIDURAL; INFILTRATION; INTRACAUDAL; PERINEURAL at 13:18

## 2022-12-12 RX ADMIN — FUROSEMIDE 10 MG: 10 INJECTION, SOLUTION INTRAVENOUS at 14:09

## 2022-12-12 RX ADMIN — EPHEDRINE SULFATE 5 MG: 50 INJECTION INTRAVENOUS at 14:07

## 2022-12-12 RX ADMIN — PROPOFOL 150 MG: 10 INJECTION, EMULSION INTRAVENOUS at 13:18

## 2022-12-12 RX ADMIN — PHENAZOPYRIDINE HYDROCHLORIDE 200 MG: 100 TABLET ORAL at 16:17

## 2022-12-12 RX ADMIN — FENTANYL CITRATE 25 MCG: 50 INJECTION, SOLUTION INTRAMUSCULAR; INTRAVENOUS at 13:24

## 2022-12-12 RX ADMIN — MIDAZOLAM 2 MG: 1 INJECTION INTRAMUSCULAR; INTRAVENOUS at 13:12

## 2022-12-12 RX ADMIN — FENTANYL CITRATE 25 MCG: 50 INJECTION, SOLUTION INTRAMUSCULAR; INTRAVENOUS at 13:35

## 2022-12-12 RX ADMIN — ONDANSETRON 4 MG: 2 INJECTION INTRAMUSCULAR; INTRAVENOUS at 14:02

## 2022-12-12 RX ADMIN — FENTANYL CITRATE 50 MCG: 50 INJECTION, SOLUTION INTRAMUSCULAR; INTRAVENOUS at 13:48

## 2022-12-12 NOTE — ANESTHESIA PREPROCEDURE EVALUATION
EF50-55%    Medical History  History Comments   GERD (gastroesophageal reflux disease)    Hypertension    Hyperlipidemia    Diabetes mellitus (HCC)    Sleep apnea    CPAP (continuous positive airway pressure) dependence    Pneumonia    Myocardial infarction (HCC)    Asthma    Kidney stone      Procedure:  TRANSURETHRAL RESECTION OF PROSTATE W/ GREENLIGHT LASER (Abdomen)    Relevant Problems   ANESTHESIA (within normal limits)      CARDIO   (+) Coronary artery disease involving native coronary artery of native heart without angina pectoris   (+) Hypertension, accelerated   (+) Mixed hyperlipidemia      ENDO   (+) Type 2 diabetes mellitus, without long-term current use of insulin (HCC)      /RENAL   (+) Stage 3 chronic kidney disease, unspecified whether stage 3a or 3b CKD (HCC)      NEURO/PSYCH   (+) History of acute anterior wall MI      PULMONARY   (+) Obstructive sleep apnea syndrome        Physical Exam    Airway    Mallampati score: III  TM Distance: >3 FB  Neck ROM: full     Dental       Cardiovascular  Rate: normal,     Pulmonary  Pulmonary exam normal     Other Findings  Per pt denies anything remaining that is loose or removeable      Anesthesia Plan  ASA Score- 3     Anesthesia Type- general with ASA Monitors  Additional Monitors:   Airway Plan: LMA  Plan Factors-Exercise tolerance (METS): >4 METS  Chart reviewed  Patient summary reviewed  Patient is not a current smoker  Induction- intravenous  Postoperative Plan- Plan for postoperative opioid use  Informed Consent- Anesthetic plan and risks discussed with patient  I personally reviewed this patient with the CRNA  Discussed and agreed on the Anesthesia Plan with the CRNA  Zain Kovacs

## 2022-12-12 NOTE — ANESTHESIA POSTPROCEDURE EVALUATION
Post-Op Assessment Note    CV Status:  Stable  Pain Score: 0    Pain management: adequate     Mental Status:  Sleepy   Hydration Status:  Euvolemic   PONV Controlled:  Controlled   Airway Patency:  Patent      Post Op Vitals Reviewed: Yes      Staff: CRNA         No notable events documented      /67 (12/12/22 1421)    Temp 98 2 °F (36 8 °C) (12/12/22 1421)    Pulse (!) 54 (12/12/22 1421)   Resp 16 (12/12/22 1421)    SpO2 95 % (12/12/22 1421)

## 2022-12-12 NOTE — DISCHARGE INSTRUCTIONS
Prostate Photovaporization   WHAT YOU NEED TO KNOW:   Prostate photovaporization (PVP) is a procedure that uses a laser to vaporize (burn away) part of the prostate gland  This can help reduce urinary problems caused by an enlarged prostate  DISCHARGE INSTRUCTIONS:   Medicines: You may need any of the following:  NSAIDs , such as ibuprofen, help decrease swelling, pain, and fever  This medicine is available with or without a doctor's order  NSAIDs can cause stomach bleeding or kidney problems in certain people  If you take blood thinner medicine, always ask your healthcare provider if NSAIDs are safe for you  Always read the medicine label and follow directions  Antibiotics  may help prevent a bacterial infection  Take your medicine as directed  Contact your healthcare provider if you think your medicine is not helping or if you have side effects  Tell him or her if you are allergic to any medicine  Keep a list of the medicines, vitamins, and herbs you take  Include the amounts, and when and why you take them  Bring the list or the pill bottles to follow-up visits  Carry your medicine list with you in case of an emergency  Follow up with your healthcare provider as directed:  Write down your questions so you remember to ask them during your visits  Bladder care:   Empty your bladder on a regular basis  Try to urinate every 3 hours while you are awake  Do not let your bladder become too full  Urinate as soon as you feel the need  Do not drink liquids before you go to bed  Urinate right before you go to bed  Follow instructions for catheterization  You may need to catheterize yourself if you cannot urinate on your own  Ask for more information on self-catheterization  Self-care:   Return to your usual activities as directed  You may be able to do your usual activities in 2 to 3 days  Avoid strenuous activity, such as heavy lifting, riding a bike, or running   Do not sit on mechanical equipment that vibrates, such as a lawnmower or tractor  These activities can cause you to have blood in your urine  Do not have sex for at least 2 weeks after your procedure  This will help your urinary system heal     Prevent constipation  Eat foods that are high in fiber, and drink more liquids  High-fiber foods, such as fruits, vegetables, and whole grains, will help soften your bowel movements  Regular exercise and extra liquids also help prevent constipation  Contact your healthcare provider if:   You have a fever  You have chills, a cough, or feel weak and achy  Your symptoms get worse  You are dizzy, have nausea, or are vomiting  You have questions or concerns about your condition or care  Seek care immediately or call 911 if:   You cannot urinate, or if you have a catheter, no urine is filling the bag  Your catheter comes out of your urethra  You have lower abdominal or back pain that does not go away  You have redness, pain, blood, or drainage where the catheter enters your penis  Your urine is red, cloudy, and foul smelling  © Copyright [address] 2022 Information is for End User's use only and may not be sold, redistributed or otherwise used for commercial purposes  All illustrations and images included in CareNotes® are the copyrighted property of [employer]  or [employer]   The above information is an  only  It is not intended as medical advice for individual conditions or treatments  Talk to your doctor, nurse or pharmacist before following any medical regimen to see if it is safe and effective for you

## 2022-12-12 NOTE — NURSING NOTE
No distress  Urine pink tinged in martinez bag  There was 900 ml's of urine emptied  Leg bag was applied  Teaching was completed with patient and his wife  Tylenol and Pyridium were given for discomfort  Ambulated without distress  Tolerated food/drink

## 2022-12-12 NOTE — OP NOTE
OPERATIVE REPORT  PATIENT NAME: Haley Mckeon    :  1950  MRN: [id number]  Pt Location:  OR ROOM 10    SURGERY DATE: 2022    Surgeon(s) and Role:     * Evelyne Schultz MD - Primary    Preop Diagnosis:  Benign prostatic hyperplasia with weak urinary stream [N40 1, R39 12]    Post-Op Diagnosis Codes: * Benign prostatic hyperplasia with weak urinary stream [N40 1, R39 12]    Procedure(s) (LRB):  TRANSURETHRAL RESECTION OF PROSTATE W/ GREENLIGHT LASER (N/A)    Specimen(s):  * No specimens in log *    Estimated Blood Loss:   Minimal    Drains:   Urethral Catheter Latex;Straight-tip 24 Fr  (Active)   Number of days: 0       Anesthesia Type:   General    Operative Indications:  Benign prostatic hyperplasia with weak urinary stream [N40 1, R39 12]      Operative Findings:  Diffusely narrow urethra dilated to 24 [place] over wire  The verumontanum was evident  There was prostatic regrowth emanating from the lower and left side of the prostatic urethra causing outflow obstruction  Ureteral orifices  are normal   Bladder is large capacity, low pressure  No stones, tumors or diverticula are noted  Complications:   None    Procedure and Technique:      Procedure and Technique:   The patient was brought to the operating room and identified as Haley Mckeon  General anesthesia was administered and the patient was placed in Lithotomy position  The patient was prepped and draped in the usual sterile fashion  Compression boots were employed  IV antibiotics administered and time out performed  Cystoscopy was performed with the 23 [place] continuous flow cystoscope  Findings are as above  Ureteral orifices and veru montanum were identified  The greenlight laser fiber was placed through the scope  The bladder neck was incised at the 5 and 7:00 positions  Vaporization then began on the prostatic regrowth at 80 fofana power  The regrowth was then vaporized down to prostatic capsule at higher energy    Anterior tissue was vaporized as this also appeared obstructing  An excellent channel was created  No vaporization was performed distal to the veru  When vaporization was complete hemostasis was confirmed and any bleeders cauterized  The ureteral orifices were identified and unaffected by laser energy  The bladder was left full and cystoscope removed  A 24 F martinez catheter was placed and balloon filled to 45 cc  The catheter irrigated well and returned light pink to clear urine  The martinez was placed to straight drainage  The patient was awakened from anesthesia  He tolerated the procedure well and was taken to the recovery room in satisfactory condition       I was present for the entire procedure    Patient Disposition:  PACU         SIGNATURE: Mariam Goodwin MD  DATE: December 12, 2022  TIME: 2:47 PM

## 2022-12-12 NOTE — INTERVAL H&P NOTE
H&P reviewed  After examining the patient I find no changes in the patients condition since the H&P had been written  Vitals:    12/12/22 1106   BP: 159/78   Pulse: 57   Resp: 16   Temp: 98 6 °F (37 °C)   SpO2: 97%   Procedure reviewed in the holding unit  Risks discussed

## 2022-12-12 NOTE — DISCHARGE INSTR - AVS FIRST PAGE
Rest and drink plenty of fluids  No heavy lifting or strenuous activity  You can shower tomorrow  Lorelei Winston continue the antibiotic prescription provided  Use Tylenol and ibuprofen for pain  The Pyridium prescription provided can up with bladder discomfort  It will turn your urine a deep orange color  Some blood in the urine is normal   Pacheco catheter should keep draining  Call the office for fever, difficulty voiding or heavy urinary bleeding

## 2022-12-12 NOTE — TELEPHONE ENCOUNTER
Patient underwent greenlight laser today December 12  He can be removed on December 15  Please call to arrange a voiding trial   He has a follow-up appointment already in January

## 2022-12-13 NOTE — TELEPHONE ENCOUNTER
Post Op Note    Bev Parsons is a 67 y o  male s/p TRANSURETHRAL RESECTION OF PROSTATE W/ GREENLIGHT LASER performed 12/12/2022  Bev Parsons is a patient of Dr Samm Diaz and is seen at the [facility] office  How would you rate your pain on a scale from 1 to 10, 10 being the worst pain ever? 0  Have you had a fever? No  Have your bowel movements been regular? No  Do you have any difficulty urinating? No  If the patient has a martinez- are you comfortable caring for your martinez? Yes Is it draining urine? Yes  Do you have any other questions or concerns that I can address at this time? No    I spoke with patient to see how he was doing since his procedure  He currently has no issues or concerns at this time  Martinez removal scheduled for 12/15/22 at 2055 [facility] op scheduled  ER precautions reviewed with patient

## 2022-12-15 ENCOUNTER — PROCEDURE VISIT (OUTPATIENT)
Dept: UROLOGY | Facility: MEDICAL CENTER | Age: 72
End: 2022-12-15

## 2022-12-15 VITALS
BODY MASS INDEX: 34.36 KG/M2 | HEART RATE: 62 BPM | HEIGHT: 60 IN | DIASTOLIC BLOOD PRESSURE: 88 MMHG | WEIGHT: 175 LBS | SYSTOLIC BLOOD PRESSURE: 145 MMHG

## 2022-12-15 DIAGNOSIS — R39.12 BENIGN PROSTATIC HYPERPLASIA WITH WEAK URINARY STREAM: Primary | ICD-10-CM

## 2022-12-15 DIAGNOSIS — N40.1 BENIGN PROSTATIC HYPERPLASIA WITH WEAK URINARY STREAM: Primary | ICD-10-CM

## 2022-12-15 NOTE — PROGRESS NOTES
12/15/2022    Sanchez Johnson  1950  [id number]    Diagnosis  Chief Complaint    BPH with weak urinary stream         Patient presents for martinez removal/void trial s/p  TRANSURETHRAL RESECTION OF PROSTATE W/ GREENLIGHT LASER on 12/12/22 with Dr Benito Shafer  Plan  Return this afternoon for PVR  Surgery fu on 1/4/23  Procedure Martinez removal/voiding trial    Urine in drainage bag red with small clots present  Irrigated bladder with 350 mls sterile water for clear return  Martinez catheter removed after deflation of an intact balloon  Patient tolerated well  Encouraged patient to hydrate well and return this afternoon for post void residual   he knows he may return early if uncomfortable and unable to urinate  Patient agrees to this plan  Patient instructed to call with any questions or concerns in the meantime  Vitals:    12/15/22 0848   BP: 145/88   Pulse: 62   Weight: 79 4 kg (175 lb)   Height: 5' (1 524 m)     Procedure:    Irrigation of bladder    Date/Time: 12/15/2022 9:15 AM  Performed by: Danica Aden RN  Authorized by: Lorena Cordon MD     Patient location:  Other (comment)  Consent:     Consent obtained:  Verbal    Consent given by:  Patient  Universal protocol:     Procedure explained and questions answered to patient or proxy's satisfaction: yes      Patient identity confirmed:  Verbally with patient  Pre-procedure details:     Procedure purpose:  Therapeutic  Anesthesia (see MAR for exact dosages): Anesthesia method:  None  Procedure details:     Bladder irrigation: yes      Urine characteristics:  Bloody  Post-procedure details:     Patient tolerance of procedure: Tolerated well, no immediate complications    Patient presents with red colored urine with small clots  Catheter irrigated with 350 mls sterile water , without difficulty  Irrigated until urine clear with no clots     Total irrigation in: 350 msL, total output: 300 mLs  Spoke to pt this afternoon    He stated he had a BM with a good amount of urination around noon  He does not wish to return to the office for PVR due to bad weather  Pt instructed to report to ER if unable to void later today or tonight      Vitals:    12/15/22 0848   BP: 145/88   Pulse: 62   Weight: 79 4 kg (175 lb)   Height: 5' (1 524 m)           Elayne Mckeon RN

## 2023-01-04 ENCOUNTER — OFFICE VISIT (OUTPATIENT)
Dept: UROLOGY | Facility: AMBULATORY SURGERY CENTER | Age: 73
End: 2023-01-04

## 2023-01-04 VITALS
HEIGHT: 65 IN | BODY MASS INDEX: 29.16 KG/M2 | RESPIRATION RATE: 18 BRPM | WEIGHT: 175 LBS | SYSTOLIC BLOOD PRESSURE: 122 MMHG | OXYGEN SATURATION: 96 % | HEART RATE: 60 BPM | DIASTOLIC BLOOD PRESSURE: 80 MMHG

## 2023-01-04 DIAGNOSIS — R39.12 BENIGN PROSTATIC HYPERPLASIA WITH WEAK URINARY STREAM: Primary | ICD-10-CM

## 2023-01-04 DIAGNOSIS — N40.1 BENIGN PROSTATIC HYPERPLASIA WITH WEAK URINARY STREAM: Primary | ICD-10-CM

## 2023-01-04 LAB — POST-VOID RESIDUAL VOLUME, ML POC: 59 ML

## 2023-01-04 NOTE — PROGRESS NOTES
01/04/23    Tiffani Manley   [birth year]   [id number]     Assessment  1 BPH s/p TURP with Greenlight laser (12/12/22)    Discussion/Plan  1 BPH s/p TURP with Greenlight laser (12/12/22)   PVR 59   Increase hydration with water, avoid constipation    Follow up 6 months with PVR or sooner if needed  Subjective  HPI   Patricia Zuniga is a 67year old male s/p TURP green light laser 12/12/22 with Dr Marlene Eldridge  It is documented that he had a diffusely narrow urethra which was dilated to 24 Fr over a wire  Prostatic regrowth was vaporized successfully  He has distant history of prior GreenLight laser procedure in 2007  He began having high PVR and worsening LUTS prompting re-evaluation  Prior Green light laser photo vaporization in 2007 by Dr Jermaine Curry was done at the time for issues with recurrent UTIs and elevated PVR  His PVR had been as high as 600 in the past  He returns today for first PVR evaluation since surgery  He states he continues with intermittent hematuria  He is hydrating with water  He was straining to have a BM post operatively and states he almost "passed out"  PSA is low and stable      Component      Latest Ref Rng & Units 4/15/2022 5/26/2022 7/27/2022           1:39 PM 10:35 AM  1:58 PM   POST-VOID RESIDUAL VOLUME, ML POC      mL 370 202 296     Component      Latest Ref Rng & Units 8/11/2022 1/4/2023          10:57 AM  1:23 PM   POST-VOID RESIDUAL VOLUME, ML POC      mL 592 59       Component      Latest Ref Rng & Units 10/4/2019 1/6/2021 3/24/2022 5/5/2022           8:02 AM  8:43 AM 10:35 AM  8:19 AM   PSA, Total      0 0 - 4 0 ng/mL 0 6 0 5 0 6 0 6     Component      Latest Ref Rng & Units 6/8/2022           8:14 AM   PSA, Total      0 0 - 4 0 ng/mL 0 3     Review of Systems - History obtained from chart review and the patient  General ROS: negative  Psychological ROS: negative  Respiratory ROS: no cough, shortness of breath, or wheezing  Cardiovascular ROS: no chest pain or dyspnea on exertion  Gastrointestinal ROS: no abdominal pain, change in bowel habits, or black or bloody stools  Genito-Urinary ROS: positive for - hematuria  Musculoskeletal ROS: negative  Neurological ROS: no TIA or stroke symptoms  Dermatological ROS: negative       Objective  Physical Exam  Vitals and nursing note reviewed  Constitutional:       General: He is awake  He is not in acute distress  Appearance: Normal appearance  He is well-developed, well-groomed and normal weight  He is not ill-appearing, toxic-appearing or diaphoretic  Pulmonary:      Effort: Pulmonary effort is normal    Abdominal:      Tenderness: There is no right CVA tenderness or left CVA tenderness  Musculoskeletal:         General: Normal range of motion  Cervical back: Normal range of motion and neck supple  Skin:     General: Skin is warm  Neurological:      General: No focal deficit present  Mental Status: He is alert and oriented to person, place, and time  Mental status is at baseline  Psychiatric:         Attention and Perception: Attention normal          Mood and Affect: Mood normal          Speech: Speech normal          Behavior: Behavior normal  Behavior is cooperative  Thought Content: Thought content normal          Cognition and Memory: Cognition normal          Judgment: Judgment normal              SAMY Murphy     I have spent 15 minutes with Patient  today in which greater than 50% of this time was spent in counseling/coordination of care regarding Diagnostic results, Prognosis, Risks and benefits of tx options, Intructions for management and Patient and family education

## 2023-01-11 ENCOUNTER — APPOINTMENT (OUTPATIENT)
Dept: LAB | Age: 73
End: 2023-01-11

## 2023-01-11 DIAGNOSIS — E13.69 OTHER SPECIFIED DIABETES MELLITUS WITH OTHER SPECIFIED COMPLICATION, UNSPECIFIED WHETHER LONG TERM INSULIN USE (HCC): ICD-10-CM

## 2023-01-11 DIAGNOSIS — R53.83 OTHER FATIGUE: ICD-10-CM

## 2023-01-11 LAB
ALBUMIN SERPL BCP-MCNC: 3.5 G/DL (ref 3.5–5)
ALP SERPL-CCNC: 58 U/L (ref 46–116)
ALT SERPL W P-5'-P-CCNC: 24 U/L (ref 12–78)
ANION GAP SERPL CALCULATED.3IONS-SCNC: 6 MMOL/L (ref 4–13)
AST SERPL W P-5'-P-CCNC: 21 U/L (ref 5–45)
BASOPHILS # BLD AUTO: 0.04 THOUSANDS/ÂΜL (ref 0–0.1)
BASOPHILS NFR BLD AUTO: 1 % (ref 0–1)
BILIRUB SERPL-MCNC: 0.38 MG/DL (ref 0.2–1)
BUN SERPL-MCNC: 14 MG/DL (ref 5–25)
CALCIUM SERPL-MCNC: 9.3 MG/DL (ref 8.3–10.1)
CHLORIDE SERPL-SCNC: 110 MMOL/L (ref 96–108)
CO2 SERPL-SCNC: 26 MMOL/L (ref 21–32)
CREAT SERPL-MCNC: 1.16 MG/DL (ref 0.6–1.3)
EOSINOPHIL # BLD AUTO: 0.19 THOUSAND/ÂΜL (ref 0–0.61)
EOSINOPHIL NFR BLD AUTO: 4 % (ref 0–6)
ERYTHROCYTE [DISTWIDTH] IN BLOOD BY AUTOMATED COUNT: 12.5 % (ref 11.6–15.1)
GFR SERPL CREATININE-BSD FRML MDRD: 62 ML/MIN/1.73SQ M
GLUCOSE P FAST SERPL-MCNC: 114 MG/DL (ref 65–99)
HCT VFR BLD AUTO: 41.2 % (ref 36.5–49.3)
HGB BLD-MCNC: 14.1 G/DL (ref 12–17)
IMM GRANULOCYTES # BLD AUTO: 0.08 THOUSAND/UL (ref 0–0.2)
IMM GRANULOCYTES NFR BLD AUTO: 2 % (ref 0–2)
LYMPHOCYTES # BLD AUTO: 1.52 THOUSANDS/ÂΜL (ref 0.6–4.47)
LYMPHOCYTES NFR BLD AUTO: 29 % (ref 14–44)
MCH RBC QN AUTO: 33.4 PG (ref 26.8–34.3)
MCHC RBC AUTO-ENTMCNC: 34.2 G/DL (ref 31.4–37.4)
MCV RBC AUTO: 98 FL (ref 82–98)
MONOCYTES # BLD AUTO: 0.42 THOUSAND/ÂΜL (ref 0.17–1.22)
MONOCYTES NFR BLD AUTO: 8 % (ref 4–12)
NEUTROPHILS # BLD AUTO: 3.08 THOUSANDS/ÂΜL (ref 1.85–7.62)
NEUTS SEG NFR BLD AUTO: 56 % (ref 43–75)
NRBC BLD AUTO-RTO: 0 /100 WBCS
PLATELET # BLD AUTO: 182 THOUSANDS/UL (ref 149–390)
PMV BLD AUTO: 10.9 FL (ref 8.9–12.7)
POTASSIUM SERPL-SCNC: 3.8 MMOL/L (ref 3.5–5.3)
PROT SERPL-MCNC: 6.9 G/DL (ref 6.4–8.4)
RBC # BLD AUTO: 4.22 MILLION/UL (ref 3.88–5.62)
SODIUM SERPL-SCNC: 142 MMOL/L (ref 135–147)
WBC # BLD AUTO: 5.33 THOUSAND/UL (ref 4.31–10.16)

## 2023-01-12 LAB
EST. AVERAGE GLUCOSE BLD GHB EST-MCNC: 123 MG/DL
HBA1C MFR BLD: 5.9 %

## 2023-01-18 ENCOUNTER — APPOINTMENT (OUTPATIENT)
Dept: LAB | Age: 73
End: 2023-01-18

## 2023-01-18 DIAGNOSIS — R35.0 URINARY FREQUENCY: ICD-10-CM

## 2023-01-19 LAB — BACTERIA UR CULT: NORMAL

## 2023-01-20 ENCOUNTER — TELEPHONE (OUTPATIENT)
Dept: UROLOGY | Facility: MEDICAL CENTER | Age: 73
End: 2023-01-20

## 2023-01-20 ENCOUNTER — APPOINTMENT (OUTPATIENT)
Dept: LAB | Age: 73
End: 2023-01-20

## 2023-01-20 DIAGNOSIS — N34.2 INFECTIVE URETHRITIS: ICD-10-CM

## 2023-01-20 DIAGNOSIS — R30.0 DYSURIA: Primary | ICD-10-CM

## 2023-01-20 RX ORDER — PHENAZOPYRIDINE HYDROCHLORIDE 200 MG/1
200 TABLET, FILM COATED ORAL 3 TIMES DAILY PRN
Qty: 10 TABLET | Refills: 0 | Status: SHIPPED | OUTPATIENT
Start: 2023-01-20

## 2023-01-20 NOTE — TELEPHONE ENCOUNTER
Patient urine culture from 1/18/2023 was negative for infection  Would encourage patient to increase water intake and prescription for Pyridium was sent to his pharmacy to take as needed for management of dysuria

## 2023-01-20 NOTE — TELEPHONE ENCOUNTER
Called and LM for patient that recent urine testing was negative for infection  Advised to make sure he is avoiding bladder irritants and increasing his water intake  Informed he could try OTC AZO for the burning and that I would message the provider for input

## 2023-01-20 NOTE — TELEPHONE ENCOUNTER
Boaz from PCP Dr Gen Browning called stated that pt has cloudy urine and pain urinating       Pt can be reached at [phone]

## 2023-01-21 LAB — BACTERIA UR CULT: ABNORMAL

## 2023-01-24 ENCOUNTER — TELEPHONE (OUTPATIENT)
Dept: LAB | Facility: HOSPITAL | Age: 73
End: 2023-01-24

## 2023-01-24 NOTE — TELEPHONE ENCOUNTER
Returned call to patient last seen in our [place] location for BPH  Patient  reported change in stream   Was on cipro since the 18th  He went to pcp after urine testing from which was ordered by our practice was reported as negative for infection  Symptoms re[ported then frequency, urgency and burning, very cloudy urine  Pcp reordered UC which was + for infection and was prescribed cipro for 7 days    Now urine is clear to yellow  + BM was today  Still reporting frequency , urgency and change in stream has to apply a lot of pressure to void  Feels like he is not emptying  No fever or chills reported  Appt given for tomorrow due to time of day for pvr with RN in the [place] location  Advised if unable to urinate will need to go to [facility] overnight

## 2023-01-24 NOTE — TELEPHONE ENCOUNTER
Patient called stating about a week ago his urine stream was slow  He stating he is taking antibiotics Cipro and he still having issues  His stream is very slow now  He wants to know how to proceed        Patient can be reached at  [phone]

## 2023-01-24 NOTE — TELEPHONE ENCOUNTER
Patient underwent urine culture on 1/20/2023, 2 days after his last urine culture from 1/18/2023  Unsure reason for repeated urine testing, but may have been ordered by different providers  Urine culture from 1/20 noted small presence of staph organism, which can be commonly found on carlos of skin  If he is not experiencing any symptoms of acute infection, will hold off on antibiotics at this time  Please call patient to follow up on symptoms

## 2023-01-24 NOTE — TELEPHONE ENCOUNTER
LMOM for pt to call office back  Encouraged pt to use [facility] for communication as well  Office number provided  Please be aware there are several encounter open on this pt

## 2023-01-25 ENCOUNTER — PROCEDURE VISIT (OUTPATIENT)
Dept: UROLOGY | Facility: AMBULATORY SURGERY CENTER | Age: 73
End: 2023-01-25

## 2023-01-25 VITALS
HEIGHT: 65 IN | WEIGHT: 168 LBS | SYSTOLIC BLOOD PRESSURE: 138 MMHG | BODY MASS INDEX: 27.99 KG/M2 | DIASTOLIC BLOOD PRESSURE: 88 MMHG | HEART RATE: 58 BPM

## 2023-01-25 DIAGNOSIS — R30.0 DYSURIA: Primary | ICD-10-CM

## 2023-01-25 DIAGNOSIS — R39.12 WEAK URINARY STREAM: ICD-10-CM

## 2023-01-25 LAB — POST-VOID RESIDUAL VOLUME, ML POC: 241 ML

## 2023-01-25 RX ORDER — PHENAZOPYRIDINE HYDROCHLORIDE 200 MG/1
200 TABLET, FILM COATED ORAL
Qty: 20 TABLET | Refills: 0 | Status: SHIPPED | OUTPATIENT
Start: 2023-01-25

## 2023-01-25 RX ORDER — SULFAMETHOXAZOLE AND TRIMETHOPRIM 800; 160 MG/1; MG/1
1 TABLET ORAL EVERY 12 HOURS SCHEDULED
Qty: 14 TABLET | Refills: 0 | Status: SHIPPED | OUTPATIENT
Start: 2023-01-25 | End: 2023-02-01

## 2023-01-25 NOTE — PROGRESS NOTES
1/25/2023  Wayne Madsen is a 67 y o  male  [id number]    Diagnosis:  Chief Complaint    PVR         Patient presents for follow up post void residual s/p complaints of weak urinary stream, burning, urgency, and frequency managed by Dr Diez Adjutant:  Follow up as scheduled  Stop taking the Cipro and begin new antibiotic  Knows to call the office in the meantime with concerns  Assessment:  Patient presents for weak urinary stream, urgency, frequency, burning, and cloudy urine s/p urine testing 1/18 and 1/20  Patient has BPH and s/p greenlight with laser surgery 12/12/22 with Dr Jordon Molina  Vitals:    01/25/23 0847   BP: 138/88   Pulse: 58   Weight: 76 2 kg (168 lb)   Height: 5' 5" (1 651 m)       Patient voided in the office  Post void residual measured via bladder scanner to be 241 mL  Spoke with SAMY Cha who suggested stopping Cipro and beginning Keflex/Macrobid, Pyridium and increasing his water intake  Relayed information to patient  He understands      Recent Results (from the past 6 hour(s))   POCT Measure PVR    Collection Time: 01/25/23  8:50 AM   Result Value Ref Range    POST-VOID RESIDUAL VOLUME, ML  mL         Carline Russ RN

## 2023-01-31 ENCOUNTER — OFFICE VISIT (OUTPATIENT)
Dept: UROLOGY | Facility: AMBULATORY SURGERY CENTER | Age: 73
End: 2023-01-31

## 2023-01-31 VITALS
SYSTOLIC BLOOD PRESSURE: 126 MMHG | DIASTOLIC BLOOD PRESSURE: 80 MMHG | BODY MASS INDEX: 29.45 KG/M2 | OXYGEN SATURATION: 99 % | WEIGHT: 177 LBS | HEART RATE: 52 BPM

## 2023-01-31 DIAGNOSIS — N39.0 RECURRENT UTI: Primary | ICD-10-CM

## 2023-01-31 DIAGNOSIS — N40.1 BPH WITH OBSTRUCTION/LOWER URINARY TRACT SYMPTOMS: ICD-10-CM

## 2023-01-31 DIAGNOSIS — N39.0 URINARY TRACT INFECTION WITHOUT HEMATURIA, SITE UNSPECIFIED: ICD-10-CM

## 2023-01-31 DIAGNOSIS — N13.8 BPH WITH OBSTRUCTION/LOWER URINARY TRACT SYMPTOMS: ICD-10-CM

## 2023-01-31 LAB
SL AMB  POCT GLUCOSE, UA: NORMAL
SL AMB LEUKOCYTE ESTERASE,UA: NORMAL
SL AMB POCT BILIRUBIN,UA: NORMAL
SL AMB POCT BLOOD,UA: NORMAL
SL AMB POCT CLARITY,UA: NORMAL
SL AMB POCT COLOR,UA: NORMAL
SL AMB POCT KETONES,UA: NORMAL
SL AMB POCT NITRITE,UA: NORMAL
SL AMB POCT PH,UA: 6
SL AMB POCT SPECIFIC GRAVITY,UA: 1.02
SL AMB POCT URINE PROTEIN: NORMAL
SL AMB POCT UROBILINOGEN: 0.2

## 2023-01-31 RX ORDER — CEFUROXIME AXETIL 500 MG/1
500 TABLET ORAL EVERY 12 HOURS SCHEDULED
Qty: 14 TABLET | Refills: 0 | Status: SHIPPED | OUTPATIENT
Start: 2023-01-31 | End: 2023-02-07

## 2023-01-31 NOTE — PATIENT INSTRUCTIONS
Start Cefuroxime   Start Probiotics   Schedule cystoscopy   Call the office for concerns or questions

## 2023-01-31 NOTE — PROGRESS NOTES
1/31/2023      No chief complaint on file  Assessment and Plan    67 y o  male managed by our office    1  Benign prostatic hyperplasia with lower urinary tract symptoms  · Status post greenlight laser TURP 12/12/2022  · Bladder scan  mL  · Cystoscopy ordered    2  Dysuria  · Urine dip in office positive for large blood, moderate leukocytes  · Urine culture ordered  · Prescription for cefuroxime provided    History of Present Illness  Mary Enamorado is a 67 y o  male here for follow up evaluation of urinary symptoms secondary to benign prostatic hyperplasia  Patient is status post greenlight laser TURP 12/12/2022 with Dr Anshu Meraz  On evaluation in the office today patient with complaints of urinary frequency, urgency and dysuria  He denies fever and chills  He denies hematuria  He is moderately bothered by these lower urinary tract symptoms  He also has complaints of sensation of acute finding with urination  Review of Systems   Constitutional: Negative for chills and fever  Respiratory: Negative for cough and shortness of breath  Cardiovascular: Negative for chest pain  Gastrointestinal: Negative for abdominal distention, abdominal pain, blood in stool, nausea and vomiting  Genitourinary: Positive for dysuria, frequency and urgency  Negative for difficulty urinating, enuresis, flank pain and hematuria  Skin: Negative for rash         Past Medical History  Past Medical History:   Diagnosis Date   • Asthma    • CPAP (continuous positive airway pressure) dependence    • Diabetes mellitus ([facility] [address] )    • GERD (gastroesophageal reflux disease)    • Hyperlipidemia    • Hypertension    • Kidney stone    • Myocardial infarction [facility])    • Pneumonia    • Sleep apnea        Past Social History  Past Surgical History:   Procedure Laterality Date   • APPENDECTOMY     • CHOLECYSTECTOMY     • COLONOSCOPY     • CORONARY ANGIOPLASTY WITH STENT PLACEMENT  2016    2 stent   • MI LASER VAPORIZATION OF PROSTATE FOR URINE FLOW N/A 12/12/2022    Procedure: TRANSURETHRAL RESECTION OF PROSTATE W/ GREENLIGHT LASER;  Surgeon: Bereket Hilton MD;  Location: [facility] MAIN OR;  Service: Urology   • TRANSURETHRAL RESECTION OF PROSTATE       Social History     Tobacco Use   Smoking Status Never   Smokeless Tobacco Never       Past Family History  Family History   Problem Relation Age of Onset   • Lymphoma Father    • Heart defect Mother    • Diabetes Mother    • Asthma Paternal Grandmother    • Heart disease Maternal Grandfather    • Seizures Brother         Epilepsy   • Uterine cancer Sister    • Asthma Child        Past Social history  Social History     Socioeconomic History   • Marital status: /Civil Union     Spouse name: Not on file   • Number of children: Not on file   • Years of education: Not on file   • Highest education level: Not on file   Occupational History   • Not on file   Tobacco Use   • Smoking status: Never   • Smokeless tobacco: Never   Vaping Use   • Vaping Use: Never used   Substance and Sexual Activity   • Alcohol use: Yes     Comment: occasional once or  twice a mothn a beer   • Drug use: Never   • Sexual activity: Not on file   Other Topics Concern   • Not on file   Social History Narrative   • Not on file     Social Determinants of Health     Financial Resource Strain: Not on file   Food Insecurity: Not on file   Transportation Needs: Not on file   Physical Activity: Not on file   Stress: Not on file   Social Connections: Not on file   Intimate Partner Violence: Not on file   Housing Stability: Not on file       Current Medications  Current Outpatient Medications   Medication Sig Dispense Refill   • Accu-Chek Emily Plus test strip      • amLODIPine (NORVASC) 2 5 mg tablet take 1 tablet by mouth twice a day     • ASPIRIN 81 PO Take by mouth daily after breakfast     • Cholecalciferol (VITAMIN D3 PO) Take by mouth daily after breakfast     • Choline Fenofibrate (FENOFIBRIC ACID) 135 MG CPDR Take by mouth daily after breakfast     • dexlansoprazole (DEXILANT) 60 MG capsule Take 60 mg by mouth daily     • glimepiride (AMARYL) 2 mg tablet Take 2 mg by mouth daily with breakfast     • lisinopril (ZESTRIL) 10 mg tablet Take 10 mg by mouth daily     • Menaquinone-7 (VITAMIN K2 PO) Take by mouth daily after breakfast     • metoprolol tartrate (LOPRESSOR) 50 mg tablet Take 25 mg by mouth every 12 (twelve) hours     • multivitamin (THERAGRAN) TABS Take 1 tablet by mouth in the morning  • phenazopyridine (PYRIDIUM) 200 mg tablet Take 1 tablet (200 mg total) by mouth 3 (three) times a day as needed for bladder spasms (Patient not taking: Reported on 3/3/2023) 10 tablet 0   • phenazopyridine (PYRIDIUM) 200 mg tablet Take 1 tablet (200 mg total) by mouth 3 (three) times a day with meals (Patient not taking: Reported on 3/3/2023) 20 tablet 0   • rosuvastatin (CRESTOR) 20 MG tablet Take 20 mg by mouth in the morning  • Semaglutide 7 MG TABS Take by mouth daily after breakfast     • SITagliptin-metFORMIN HCl -1000 MG TB24 Take 1 tablet by mouth daily after breakfast     • tamsulosin (FLOMAX) 0 4 mg Take 1 capsule (0 4 mg total) by mouth daily with dinner 30 capsule 3     No current facility-administered medications for this visit  Allergies  No Known Allergies      The following portions of the patient's history were reviewed and updated as appropriate: allergies, current medications, past medical history, past social history, past surgical history and problem list       Vitals  Vitals:    01/31/23 1048   BP: 126/80   BP Location: Left arm   Patient Position: Sitting   Cuff Size: Adult   Pulse: (!) 52   SpO2: 99%   Weight: 80 3 kg (177 lb)           Physical Exam  Physical Exam  Vitals reviewed  Constitutional:       General: He is not in acute distress  Appearance: Normal appearance  He is normal weight  HENT:      Head: Normocephalic  Cardiovascular:      Rate and Rhythm: Normal rate  Pulmonary:      Effort: No respiratory distress  Breath sounds: Normal breath sounds  Skin:     General: Skin is warm and dry  Neurological:      General: No focal deficit present  Mental Status: He is alert and oriented to person, place, and time     Psychiatric:         Mood and Affect: Mood normal          Behavior: Behavior normal        Results  No results found for this or any previous visit (from the past 1 hour(s)) ]  Lab Results   Component Value Date    PSA 0 3 06/08/2022    PSA 0 6 05/05/2022    PSA 0 6 03/24/2022     Lab Results   Component Value Date    CALCIUM 10 0 03/14/2023    K 4 5 03/14/2023    CO2 26 03/14/2023     (H) 03/14/2023    BUN 20 03/14/2023    CREATININE 1 42 (H) 03/14/2023     Lab Results   Component Value Date    WBC 7 29 02/05/2023    HGB 14 1 02/05/2023    HCT 41 0 02/05/2023    MCV 97 02/05/2023     02/05/2023     Orders  Orders Placed This Encounter   Procedures   • Cystoscopy     Standing Status:   Future     Standing Expiration Date:   7/31/2023   • Urine culture     Order Specific Question:   [facility]     Answer:   Immediate   • POCT urine dip       SAMY Garvey

## 2023-02-01 LAB — BACTERIA UR CULT: ABNORMAL

## 2023-02-05 ENCOUNTER — HOSPITAL ENCOUNTER (EMERGENCY)
Facility: HOSPITAL | Age: 73
Discharge: HOME | End: 2023-02-05
Attending: EMERGENCY MEDICINE

## 2023-02-05 VITALS
BODY MASS INDEX: 29.16 KG/M2 | SYSTOLIC BLOOD PRESSURE: 155 MMHG | OXYGEN SATURATION: 98 % | HEIGHT: 65 IN | DIASTOLIC BLOOD PRESSURE: 80 MMHG | HEART RATE: 76 BPM | WEIGHT: 175 LBS | TEMPERATURE: 99.1 F | RESPIRATION RATE: 18 BRPM

## 2023-02-05 DIAGNOSIS — R33.8 ACUTE URINARY RETENTION: Primary | ICD-10-CM

## 2023-02-05 DIAGNOSIS — N17.9 AKI (ACUTE KIDNEY INJURY) (HCC): ICD-10-CM

## 2023-02-05 LAB
ALBUMIN SERPL BCP-MCNC: 4.2 G/DL (ref 3.5–5)
ALP SERPL-CCNC: 50 U/L (ref 34–104)
ALT SERPL W P-5'-P-CCNC: 16 U/L (ref 7–52)
ANION GAP SERPL CALCULATED.3IONS-SCNC: 8 MMOL/L (ref 4–13)
AST SERPL W P-5'-P-CCNC: 25 U/L (ref 13–39)
BACTERIA UR QL AUTO: ABNORMAL /HPF
BASOPHILS # BLD AUTO: 0.02 THOUSANDS/ÂΜL (ref 0–0.1)
BASOPHILS NFR BLD AUTO: 0 % (ref 0–1)
BILIRUB SERPL-MCNC: 0.45 MG/DL (ref 0.2–1)
BILIRUB UR QL STRIP: NEGATIVE
BUN SERPL-MCNC: 28 MG/DL (ref 5–25)
CALCIUM SERPL-MCNC: 9.8 MG/DL (ref 8.4–10.2)
CHLORIDE SERPL-SCNC: 109 MMOL/L (ref 96–108)
CLARITY UR: CLEAR
CO2 SERPL-SCNC: 22 MMOL/L (ref 21–32)
COLOR UR: ABNORMAL
CREAT SERPL-MCNC: 1.61 MG/DL (ref 0.6–1.3)
EOSINOPHIL # BLD AUTO: 0.14 THOUSAND/ÂΜL (ref 0–0.61)
EOSINOPHIL NFR BLD AUTO: 2 % (ref 0–6)
ERYTHROCYTE [DISTWIDTH] IN BLOOD BY AUTOMATED COUNT: 12.5 % (ref 11.6–15.1)
GFR SERPL CREATININE-BSD FRML MDRD: 41 ML/MIN/1.73SQ M
GLUCOSE SERPL-MCNC: 169 MG/DL (ref 65–140)
GLUCOSE UR STRIP-MCNC: ABNORMAL MG/DL
HCT VFR BLD AUTO: 41 % (ref 36.5–49.3)
HGB BLD-MCNC: 14.1 G/DL (ref 12–17)
HGB UR QL STRIP.AUTO: ABNORMAL
IMM GRANULOCYTES # BLD AUTO: 0.08 THOUSAND/UL (ref 0–0.2)
IMM GRANULOCYTES NFR BLD AUTO: 1 % (ref 0–2)
KETONES UR STRIP-MCNC: NEGATIVE MG/DL
LEUKOCYTE ESTERASE UR QL STRIP: ABNORMAL
LYMPHOCYTES # BLD AUTO: 1.69 THOUSANDS/ÂΜL (ref 0.6–4.47)
LYMPHOCYTES NFR BLD AUTO: 23 % (ref 14–44)
MCH RBC QN AUTO: 33.4 PG (ref 26.8–34.3)
MCHC RBC AUTO-ENTMCNC: 34.4 G/DL (ref 31.4–37.4)
MCV RBC AUTO: 97 FL (ref 82–98)
MONOCYTES # BLD AUTO: 0.51 THOUSAND/ÂΜL (ref 0.17–1.22)
MONOCYTES NFR BLD AUTO: 7 % (ref 4–12)
NEUTROPHILS # BLD AUTO: 4.85 THOUSANDS/ÂΜL (ref 1.85–7.62)
NEUTS SEG NFR BLD AUTO: 67 % (ref 43–75)
NITRITE UR QL STRIP: NEGATIVE
NON-SQ EPI CELLS URNS QL MICRO: ABNORMAL /HPF
NRBC BLD AUTO-RTO: 0 /100 WBCS
PH UR STRIP.AUTO: 5.5 [PH]
PLATELET # BLD AUTO: 173 THOUSANDS/UL (ref 149–390)
PMV BLD AUTO: 10.6 FL (ref 8.9–12.7)
POTASSIUM SERPL-SCNC: 4.4 MMOL/L (ref 3.5–5.3)
PROT SERPL-MCNC: 6.8 G/DL (ref 6.4–8.4)
PROT UR STRIP-MCNC: ABNORMAL MG/DL
RBC # BLD AUTO: 4.22 MILLION/UL (ref 3.88–5.62)
RBC #/AREA URNS AUTO: ABNORMAL /HPF
SODIUM SERPL-SCNC: 139 MMOL/L (ref 135–147)
SP GR UR STRIP.AUTO: 1.02 (ref 1–1.03)
UROBILINOGEN UR STRIP-ACNC: <2 MG/DL
WBC # BLD AUTO: 7.29 THOUSAND/UL (ref 4.31–10.16)
WBC #/AREA URNS AUTO: ABNORMAL /HPF

## 2023-02-05 RX ADMIN — SODIUM CHLORIDE 1000 ML: 0.9 INJECTION, SOLUTION INTRAVENOUS at 22:30

## 2023-02-06 LAB — BACTERIA UR CULT: NORMAL

## 2023-02-06 NOTE — DISCHARGE INSTRUCTIONS
Call and schedule a follow-up appointment with your urologist   I provided you with instructions for how to care for your Pacheco catheter and how to change her bag  Please return to the emergency department should you notice that you are not draining any urine from your Pacheco catheter, should you develop any significant abdominal tenderness or flank pain, fevers or chills, or any other concerning signs or symptoms

## 2023-02-06 NOTE — ED PROVIDER NOTES
History  Chief Complaint   Patient presents with   • Urinary Retention     Patient has been unable to urinate for about 24 hours  He had a little relief this morning, but it is still not better  Had laser sx in December and has been having issues ever since  Including UTI's     Raeann Bennett is a 67year old male with a history of benign prostatic hyperplasia presenting for evaluation of acute urinary retention  The patient described that he has not been able to urinate all day  Patient had transurethral resection of the prostate in December, he describes having some difficulty fully emptying his bladder since the procedure as well as having persistent urinary tract infections  Per chart review, it appears that the patient has been growing staph in his urine, he is currently taking Ceftin  Prior to my evaluation, nursing staff had placed a Pacheco catheter with drainage of 900 mL of urine  The patient denies any abdominal tenderness or flank tenderness at this time  History provided by:  Patient   used: No        Prior to Admission Medications   Prescriptions Last Dose Informant Patient Reported? Taking?    ASPIRIN 81 PO   Yes No   Sig: Take by mouth daily after breakfast   Accu-Chek Emily Plus test strip   Yes No   Cholecalciferol (VITAMIN D3 PO)   Yes No   Sig: Take by mouth daily after breakfast   Choline Fenofibrate (FENOFIBRIC ACID) 135 MG CPDR   Yes No   Sig: Take by mouth daily after breakfast   Menaquinone-7 (VITAMIN K2 PO)   Yes No   Sig: Take by mouth daily after breakfast   SITagliptin-metFORMIN HCl -1000 MG TB24   Yes No   Sig: Take 1 tablet by mouth daily after breakfast   Semaglutide 7 MG TABS   Yes No   Sig: Take by mouth daily after breakfast   amLODIPine (NORVASC) 2 5 mg tablet   Yes No   Sig: take 1 tablet by mouth twice a day   cefuroxime (CEFTIN) 500 mg tablet 2/5/2023  No Yes   Sig: Take 1 tablet (500 mg total) by mouth every 12 (twelve) hours for 7 days dexlansoprazole (DEXILANT) 60 MG capsule   Yes No   Sig: Take 60 mg by mouth daily   glimepiride (AMARYL) 2 mg tablet   Yes No   Sig: Take 2 mg by mouth daily with breakfast   lisinopril (ZESTRIL) 10 mg tablet   Yes No   Sig: Take 10 mg by mouth daily   metoprolol tartrate (LOPRESSOR) 50 mg tablet   Yes No   Sig: Take 25 mg by mouth every 12 (twelve) hours   multivitamin (THERAGRAN) TABS   Yes No   Sig: Take 1 tablet by mouth in the morning  phenazopyridine (PYRIDIUM) 200 mg tablet   No No   Sig: Take 1 tablet (200 mg total) by mouth 3 (three) times a day as needed for bladder spasms   phenazopyridine (PYRIDIUM) 200 mg tablet   No No   Sig: Take 1 tablet (200 mg total) by mouth 3 (three) times a day with meals   rosuvastatin (CRESTOR) 20 MG tablet   Yes No   Sig: Take 20 mg by mouth in the morning        Facility-Administered Medications: None       Past Medical History:   Diagnosis Date   • Asthma    • CPAP (continuous positive airway pressure) dependence    • Diabetes mellitus (HCC)    • GERD (gastroesophageal reflux disease)    • Hyperlipidemia    • Hypertension    • Kidney stone    • Myocardial infarction [facility])    • Pneumonia    • Sleep apnea        Past Surgical History:   Procedure Laterality Date   • APPENDECTOMY     • CHOLECYSTECTOMY     • COLONOSCOPY     • CORONARY ANGIOPLASTY WITH STENT PLACEMENT  2016    2 stent   • DC LASER VAPORIZATION OF PROSTATE FOR URINE FLOW N/A 12/12/2022    Procedure: TRANSURETHRAL RESECTION OF PROSTATE W/ GREENLIGHT LASER;  Surgeon: Elizabeth Clarke MD;  Location: [address] [place] [address];  Service: Urology   • TRANSURETHRAL RESECTION OF PROSTATE         Family History   Problem Relation Age of Onset   • Lymphoma Father    • Heart defect Mother    • Diabetes Mother    • Asthma Paternal Grandmother    • Heart disease Maternal Grandfather    • Seizures Brother         Epilepsy   • Uterine cancer Sister    • Asthma Child      I have reviewed and agree with the history as documented  E-Cigarette/Vaping   • E-Cigarette Use Never User      E-Cigarette/Vaping Substances     Social History     Tobacco Use   • Smoking status: Never   • Smokeless tobacco: Never   Vaping Use   • Vaping Use: Never used   Substance Use Topics   • Alcohol use: Yes     Comment: occasional once or  twice a mothn a beer   • Drug use: Never        Review of Systems   Constitutional: Negative for chills and fever  HENT: Negative for ear pain and sore throat  Eyes: Negative for pain and visual disturbance  Respiratory: Negative for cough and shortness of breath  Cardiovascular: Negative for chest pain and palpitations  Gastrointestinal: Negative for abdominal pain and vomiting  Genitourinary: Positive for difficulty urinating  Negative for dysuria and hematuria  Musculoskeletal: Negative for arthralgias and back pain  Skin: Negative for color change and rash  Neurological: Negative for seizures and syncope  All other systems reviewed and are negative  Physical Exam  ED Triage Vitals   Temperature Pulse Respirations Blood Pressure SpO2   02/05/23 2010 02/05/23 2010 02/05/23 2010 02/05/23 2010 02/05/23 2010   99 1 °F (37 3 °C) 65 20 (!) 186/89 96 %      Temp Source Heart Rate Source Patient Position - Orthostatic VS BP Location FiO2 (%)   02/05/23 2010 02/05/23 2010 02/05/23 2010 02/05/23 2010 --   Oral Monitor Sitting Right arm       Pain Score       02/05/23 2106       3             Orthostatic Vital Signs  Vitals:    02/05/23 2106 02/05/23 2130 02/05/23 2200 02/05/23 2230   BP: 156/72 151/78 144/73 155/80   Pulse: 68 66 70 76   Patient Position - Orthostatic VS: Sitting Sitting Sitting Sitting       Physical Exam  Vitals and nursing note reviewed  Constitutional:       Appearance: Normal appearance  HENT:      Head: Normocephalic and atraumatic        Right Ear: External ear normal       Left Ear: External ear normal       Mouth/Throat:      Mouth: Mucous membranes are moist  Pharynx: Oropharynx is clear  Eyes:      General: No scleral icterus  Conjunctiva/sclera: Conjunctivae normal    Cardiovascular:      Rate and Rhythm: Normal rate and regular rhythm  Pulses: Normal pulses  Heart sounds: Normal heart sounds  Pulmonary:      Effort: Pulmonary effort is normal  No respiratory distress  Breath sounds: Normal breath sounds  Abdominal:      General: Abdomen is flat  There is no distension  Palpations: Abdomen is soft  Tenderness: There is no abdominal tenderness  There is no right CVA tenderness, left CVA tenderness, guarding or rebound  Genitourinary:     Comments: Patient has approximately 950 mL of urine in his catch bag  Musculoskeletal:         General: No tenderness or signs of injury  Cervical back: Neck supple  No rigidity  Skin:     General: Skin is warm  Coloration: Skin is not jaundiced  Findings: No erythema or rash  Neurological:      General: No focal deficit present  Mental Status: He is alert  Mental status is at baseline     Psychiatric:         Mood and Affect: Mood normal          Behavior: Behavior normal          ED Medications  Medications   sodium chloride 0 9 % bolus 1,000 mL (0 mL Intravenous Stopped 2/5/23 8514)       Diagnostic Studies  Results Reviewed     Procedure Component Value Units Date/Time    Comprehensive metabolic panel [[id number]]  (Abnormal) Collected: 02/05/23 2048    Lab Status: Final result Specimen: Blood from Arm, Right Updated: 02/05/23 2114     Sodium 139 mmol/L      Potassium 4 4 mmol/L      Chloride 109 mmol/L      CO2 22 mmol/L      ANION GAP 8 mmol/L      BUN 28 mg/dL      Creatinine 1 61 mg/dL      Glucose 169 mg/dL      Calcium 9 8 mg/dL      AST 25 U/L      ALT 16 U/L      Alkaline Phosphatase 50 U/L      Total Protein 6 8 g/dL      Albumin 4 2 g/dL      Total Bilirubin 0 45 mg/dL      eGFR 41 ml/min/1 73sq m     Narrative:      [place] guidelines for Chronic Kidney Disease (CKD):   •  Stage 1 with normal or high GFR (GFR > 90 mL/min/1 73 square meters)  •  Stage 2 Mild CKD (GFR = 60-89 mL/min/1 73 square meters)  •  Stage 3A Moderate CKD (GFR = 45-59 mL/min/1 73 square meters)  •  Stage 3B Moderate CKD (GFR = 30-44 mL/min/1 73 square meters)  •  Stage 4 Severe CKD (GFR = 15-29 mL/min/1 73 square meters)  •  Stage 5 End Stage CKD (GFR <15 mL/min/1 73 square meters)  Note: GFR calculation is accurate only with a steady state creatinine    Urine Microscopic [[id number]]  (Abnormal) Collected: 02/05/23 2039    Lab Status: Final result Specimen: Urine, Indwelling Pacheco Catheter Updated: 02/05/23 2110     RBC, UA Innumerable /hpf      WBC, UA Innumerable /hpf      Epithelial Cells None Seen /hpf      Bacteria, UA None Seen /hpf     Urine culture [[id number]] Collected: 02/05/23 2039    Lab Status:  In process Specimen: Urine, Indwelling Pacheco Catheter Updated: 02/05/23 2110    UA w Reflex to Microscopic w Reflex to Culture [[id number]]  (Abnormal) Collected: 02/05/23 2039    Lab Status: Final result Specimen: Urine, Indwelling Pacheco Catheter Updated: 02/05/23 2059     Color, UA Light Yellow     Clarity, UA Clear     Specific Gravity, UA 1 025     pH, UA 5 5     Leukocytes, UA Moderate     Nitrite, UA Negative     Protein, UA 30 (1+) mg/dl      Glucose, UA Trace mg/dl      Ketones, UA Negative mg/dl      Urobilinogen, UA <2 0 mg/dl      Bilirubin, UA Negative     Occult Blood, UA Moderate    CBC and differential [[id number]] Collected: 02/05/23 2048    Lab Status: Final result Specimen: Blood from Arm, Right Updated: 02/05/23 2055     WBC 7 29 Thousand/uL      RBC 4 22 Million/uL      Hemoglobin 14 1 g/dL      Hematocrit 41 0 %      MCV 97 fL      MCH 33 4 pg      MCHC 34 4 g/dL      RDW 12 5 %      MPV 10 6 fL      Platelets 180 Thousands/uL      nRBC 0 /100 WBCs      Neutrophils Relative 67 %      Immat GRANS % 1 %      Lymphocytes Relative 23 %      Monocytes Relative 7 %      Eosinophils Relative 2 %      Basophils Relative 0 %      Neutrophils Absolute 4 85 Thousands/µL      Immature Grans Absolute 0 08 Thousand/uL      Lymphocytes Absolute 1 69 Thousands/µL      Monocytes Absolute 0 51 Thousand/µL      Eosinophils Absolute 0 14 Thousand/µL      Basophils Absolute 0 02 Thousands/µL                  No orders to display         Procedures  Procedures      ED Course                             SBIRT 22yo+    Flowsheet Row Most Recent Value   SBIRT (25 yo +)    In order to provide better care to our patients, we are screening all of our patients for alcohol and drug use  Would it be okay to ask you these screening questions? Unable to answer at this time Filed at: 02/05/2023 2014                5[address] Snow Mahan is a 67year old male with a history of benign prostatic hyperplasia presenting for evaluation of acute urinary retention  Patient was unable to urinate all day long, he had a history of recent transurethral prostatic resection in December, he did not have any episodes of acute urinary retention until today  Noted recurrent urinary tract infections following the procedure, he is currently on Ceftin  By the time I arrived to examine the patient, he already had a Pacheco catheter placed which drained over 900 mL of urine  The patient had no complaints at this time  I suspect that the patient's symptoms are likely secondary to either his prostate or his recurrent urinary tract infections  Patient's blood work showed a slight SHAHNAZ, otherwise was unremarkable  UA showed a large amount of red blood cells and white blood cells, but no bacteria, will be sent for culture    Patient was stable for discharge, advised that he continue to take his antibiotics as prescribed, gave him information for how to care for a Pacheco catheter, I advised he call and schedule a follow-up appointment with his urologist, I gave strict return precautions, he was agreeable to plan     Acute urinary retention: acute illness or injury  SHAHNAZ (acute kidney injury) ([facility] ): acute illness or injury  Amount and/or Complexity of Data Reviewed  Labs: ordered  Details: CBC, CMP, UA, SHAHNAZ noted, urine will be sent for culture            Disposition  Final diagnoses:   Acute urinary retention   SHAHNAZ (acute kidney injury) ([facility] )     Time reflects when diagnosis was documented in both MDM as applicable and the Disposition within this note     Time User Action Codes Description Comment    2/5/2023 10:31 PM Ravi Gagnon Add [R33 8] Acute urinary retention     2/6/2023  3:17 AM Ravi Gagnon Add [N17 9] SHAHNAZ (acute kidney injury) [facility])       ED Disposition     ED Disposition   Discharge    Condition   Stable    Date/Time   Sun Feb 5, 2023 10:31 PM    Comment   Fiona Sanchez discharge to home/self care                 Follow-up Information     Follow up With Specialties Details Why Contact Info    Kenna Yang MD Urology Schedule an appointment as soon as possible for a visit   [address] [place], [place] [address]  [phone]            Discharge Medication List as of 2/5/2023 10:33 PM      CONTINUE these medications which have NOT CHANGED    Details   cefuroxime (CEFTIN) 500 mg tablet Take 1 tablet (500 mg total) by mouth every 12 (twelve) hours for 7 days, Starting Tue 1/31/2023, Until Tue 2/7/2023, Normal      Accu-Chek Emily Plus test strip Historical Med      amLODIPine (NORVASC) 2 5 mg tablet take 1 tablet by mouth twice a day, Historical Med      ASPIRIN 81 PO Take by mouth daily after breakfast, Historical Med      Cholecalciferol (VITAMIN D3 PO) Take by mouth daily after breakfast, Historical Med      Choline Fenofibrate (FENOFIBRIC ACID) 135 MG CPDR Take by mouth daily after breakfast, Historical Med      dexlansoprazole (DEXILANT) 60 MG capsule Take 60 mg by mouth daily, Historical Med      glimepiride (AMARYL) 2 mg tablet Take 2 mg by mouth daily with breakfast, Starting Thu 11/10/2022, Historical Med      lisinopril (ZESTRIL) 10 mg tablet Take 10 mg by mouth daily, Starting Sat 3/27/2021, Historical Med      Menaquinone-7 (VITAMIN K2 PO) Take by mouth daily after breakfast, Historical Med      metoprolol tartrate (LOPRESSOR) 50 mg tablet Take 25 mg by mouth every 12 (twelve) hours, Historical Med      multivitamin (THERAGRAN) TABS Take 1 tablet by mouth in the morning , Historical Med      !! phenazopyridine (PYRIDIUM) 200 mg tablet Take 1 tablet (200 mg total) by mouth 3 (three) times a day as needed for bladder spasms, Starting Fri 1/20/2023, Normal      !! phenazopyridine (PYRIDIUM) 200 mg tablet Take 1 tablet (200 mg total) by mouth 3 (three) times a day with meals, Starting Wed 1/25/2023, Normal      rosuvastatin (CRESTOR) 20 MG tablet Take 20 mg by mouth in the morning , Starting Wed 5/11/2022, Historical Med      Semaglutide 7 MG TABS Take by mouth daily after breakfast, Historical Med      SITagliptin-metFORMIN HCl -1000 MG TB24 Take 1 tablet by mouth daily after breakfast, Historical Med       !! - Potential duplicate medications found  Please discuss with provider  No discharge procedures on file  PDMP Review     None           ED Provider  Attending physically available and evaluated Judie Lehman  I managed the patient along with the ED Attending      Electronically Signed by         Lady Kevin DO  02/06/23 1504

## 2023-02-06 NOTE — ED ATTENDING ATTESTATION
2/5/2023  ISasha MD, saw and evaluated the patient  I have discussed the patient with the resident/non-physician practitioner and agree with the resident's/non-physician practitioner's findings, Plan of Care, and MDM as documented in the resident's/non-physician practitioner's note, except where noted  All available labs and Radiology studies were reviewed  I was present for key portions of any procedure(s) performed by the resident/non-physician practitioner and I was immediately available to provide assistance  At this point I agree with the current assessment done in the Emergency Department  I have conducted an independent evaluation of this patient a history and physical is as follows:    S:  Chief Complaint   Patient presents with   • Urinary Retention     Patient has been unable to urinate for about 24 hours  He had a little relief this morning, but it is still not better  Had laser sx in December and has been having issues ever since  Including UTI's     Aranza Pollard is a 60-year-old male who presents with a chief complaint of urinary retention  Patient had a transurethral resection of the prostate with greenlight laser in December and has had recurrent episodes of urinary tract infection and retention since then  No fevers or chills although his temperature is slightly elevated upon presentation  Patient is already on antibiotics for presumed urinary tract infection  Patient has close urology follow-up  Patient does have suprapubic pressure like abdominal pain      O:  ED Triage Vitals   Temperature Pulse Respirations Blood Pressure SpO2   02/05/23 2010 02/05/23 2010 02/05/23 2010 02/05/23 2010 02/05/23 2010   99 1 °F (37 3 °C) 65 20 (!) 186/89 96 %      Temp Source Heart Rate Source Patient Position - Orthostatic VS BP Location FiO2 (%)   02/05/23 2010 02/05/23 2010 02/05/23 2010 02/05/23 2010 --   Oral Monitor Sitting Right arm       Pain Score       02/05/23 2106       3 Physical Exam  Vitals and nursing note reviewed  Constitutional:       General: He is in acute distress (mild)  Appearance: He is well-developed  HENT:      Head: Normocephalic and atraumatic  Eyes:      Extraocular Movements: Extraocular movements intact  Pupils: Pupils are equal, round, and reactive to light  Neck:      Vascular: No JVD  Cardiovascular:      Rate and Rhythm: Normal rate and regular rhythm  Heart sounds: Normal heart sounds  No murmur heard  No friction rub  No gallop  Pulmonary:      Effort: Pulmonary effort is normal  No respiratory distress  Breath sounds: Normal breath sounds  No wheezing or rales  Chest:      Chest wall: No tenderness  Abdominal:      General: There is no distension  Tenderness: There is abdominal tenderness (mild suprapubic)  There is no guarding  Musculoskeletal:         General: No tenderness  Normal range of motion  Cervical back: Normal range of motion  Skin:     General: Skin is warm and dry  Neurological:      General: No focal deficit present  Mental Status: He is alert and oriented to person, place, and time  Psychiatric:         Behavior: Behavior normal          Thought Content:  Thought content normal          Judgment: Judgment normal        A/P:  Background: 67 y o  male presents with signs symptoms consistent with urinary retention possible urinary tract infection    Differential DX includes but is not limited to: Secondary to BPH, side effect from prior surgery/procedure, possible obstructive SHAHNAZ    Plan: Bladder scan-likely insert Pacheco catheter, urinalysis, CBC and metabolic panel      ED Course     Labs Reviewed   UA W REFLEX TO MICROSCOPIC WITH REFLEX TO CULTURE - Abnormal       Result Value Ref Range Status    Color, UA Light Yellow   Final    Clarity, UA Clear   Final    Specific Gravity, UA 1 025  1 003 - 1 030 Final    pH, UA 5 5  4 5, 5 0, 5 5, 6 0, 6 5, 7 0, 7 5, 8 0 Final    Leukocytes, UA Moderate (*) Negative Final    Nitrite, UA Negative  Negative Final    Protein, UA 30 (1+) (*) Negative mg/dl Final    Glucose, UA Trace (*) Negative mg/dl Final    Ketones, UA Negative  Negative mg/dl Final    Urobilinogen, UA <2 0  <2 0 mg/dl mg/dl Final    Bilirubin, UA Negative  Negative Final    Occult Blood, UA Moderate (*) Negative Final   COMPREHENSIVE METABOLIC PANEL - Abnormal    Sodium 139  135 - 147 mmol/L Final    Potassium 4 4  3 5 - 5 3 mmol/L Final    Chloride 109 (*) 96 - 108 mmol/L Final    CO2 22  21 - 32 mmol/L Final    ANION GAP 8  4 - 13 mmol/L Final    BUN 28 (*) 5 - 25 mg/dL Final    Creatinine 1 61 (*) 0 60 - 1 30 mg/dL Final    Comment: Standardized to IDMS reference method    Glucose 169 (*) 65 - 140 mg/dL Final    Comment: If the patient is fasting, the ADA then defines impaired fasting glucose as > 100 mg/dL and diabetes as > or equal to 123 mg/dL  Specimen collection should occur prior to Sulfasalazine administration due to the potential for falsely depressed results  Specimen collection should occur prior to Sulfapyridine administration due to the potential for falsely elevated results  Calcium 9 8  8 4 - 10 2 mg/dL Final    AST 25  13 - 39 U/L Final    Comment: Specimen collection should occur prior to Sulfasalazine administration due to the potential for falsely depressed results  ALT 16  7 - 52 U/L Final    Comment: Specimen collection should occur prior to Sulfasalazine administration due to the potential for falsely depressed results       Alkaline Phosphatase 50  34 - 104 U/L Final    Total Protein 6 8  6 4 - 8 4 g/dL Final    Albumin 4 2  3 5 - 5 0 g/dL Final    Total Bilirubin 0 45  0 20 - 1 00 mg/dL Final    eGFR 41  ml/min/1 73sq m Final    Narrative:     [facility] guidelines for Chronic Kidney Disease (CKD):   •  Stage 1 with normal or high GFR (GFR > 90 mL/min/1 73 square meters)  •  Stage 2 Mild CKD (GFR = 60-89 mL/min/1 73 square meters)  •  Stage 3A Moderate CKD (GFR = 45-59 mL/min/1 73 square meters)  •  Stage 3B Moderate CKD (GFR = 30-44 mL/min/1 73 square meters)  •  Stage 4 Severe CKD (GFR = 15-29 mL/min/1 73 square meters)  •  Stage 5 End Stage CKD (GFR <15 mL/min/1 73 square meters)  Note: GFR calculation is accurate only with a steady state creatinine   URINE MICROSCOPIC - Abnormal    RBC, UA Innumerable (*) None Seen, 1-2 /hpf Final    WBC, UA Innumerable (*) None Seen, 1-2 /hpf Final    Epithelial Cells None Seen  None Seen, Occasional /hpf Final    Bacteria, UA None Seen  None Seen, Occasional /hpf Final   URINE CULTURE   CBC AND DIFFERENTIAL    WBC 7 29  4 31 - 10 16 Thousand/uL Final    RBC 4 22  3 88 - 5 62 Million/uL Final    Hemoglobin 14 1  12 0 - 17 0 g/dL Final    Hematocrit 41 0  36 5 - 49 3 % Final    MCV 97  82 - 98 fL Final    MCH 33 4  26 8 - 34 3 pg Final    MCHC 34 4  31 4 - 37 4 g/dL Final    RDW 12 5  11 6 - 15 1 % Final    MPV 10 6  8 9 - 12 7 fL Final    Platelets 939  133 - 390 Thousands/uL Final    nRBC 0  /100 WBCs Final    Neutrophils Relative 67  43 - 75 % Final    Immat GRANS % 1  0 - 2 % Final    Lymphocytes Relative 23  14 - 44 % Final    Monocytes Relative 7  4 - 12 % Final    Eosinophils Relative 2  0 - 6 % Final    Basophils Relative 0  0 - 1 % Final    Neutrophils Absolute 4 85  1 85 - 7 62 Thousands/µL Final    Immature Grans Absolute 0 08  0 00 - 0 20 Thousand/uL Final    Lymphocytes Absolute 1 69  0 60 - 4 47 Thousands/µL Final    Monocytes Absolute 0 51  0 17 - 1 22 Thousand/µL Final    Eosinophils Absolute 0 14  0 00 - 0 61 Thousand/µL Final    Basophils Absolute 0 02  0 00 - 0 10 Thousands/µL Final     No orders to display     Medications   sodium chloride 0 9 % bolus 1,000 mL (0 mL Intravenous Stopped 2/5/23 2309)     Patient had significant output with Pacheco catheter placement with resolution of symptoms      Critical Care Time  Procedures    Time reflects when diagnosis was documented in both MDM as applicable and the Disposition within this note     Time User Action Codes Description Comment    2/5/2023 10:31 PM Ravi Gagnon Add [R33 8] Acute urinary retention       ED Disposition     ED Disposition   Discharge    Condition   Stable    Date/Time   Sun Feb 5, 2023 10:31 PM    Comment   Drew Norton discharge to home/self care                 Follow-up Information     Follow up With Specialties Details Why Contact Info    Helene Arrieta MD Urology Schedule an appointment as soon as possible for a visit   [address] [place] [address]  [phone]

## 2023-02-13 ENCOUNTER — TELEPHONE (OUTPATIENT)
Dept: OTHER | Facility: OTHER | Age: 73
End: 2023-02-13

## 2023-02-13 NOTE — TELEPHONE ENCOUNTER
Patient called that last week he had to go to the ER due to urinary retention  They had to empty 900 ml  He was fine for a few days then it was experiencing urinary rentention again over the weekend  He moved the tubing to the catheter around and saw that he was able to get a stream going  But now he has a blood clots and is concern what to do  He does not have an appointment until next week  Please give him a call back

## 2023-02-14 ENCOUNTER — PROCEDURE VISIT (OUTPATIENT)
Dept: UROLOGY | Facility: AMBULATORY SURGERY CENTER | Age: 73
End: 2023-02-14

## 2023-02-14 VITALS
OXYGEN SATURATION: 96 % | SYSTOLIC BLOOD PRESSURE: 148 MMHG | WEIGHT: 177 LBS | HEART RATE: 63 BPM | HEIGHT: 65 IN | DIASTOLIC BLOOD PRESSURE: 80 MMHG | BODY MASS INDEX: 29.49 KG/M2

## 2023-02-14 DIAGNOSIS — R33.9 URINARY RETENTION: Primary | ICD-10-CM

## 2023-02-14 LAB — POST-VOID RESIDUAL VOLUME, ML POC: 50 ML

## 2023-02-14 NOTE — PROGRESS NOTES
2/14/2023  Joy Townsend is a 67 y o  male  [id number]    Diagnosis:  Chief Complaint    Urinary Retention         Patient presents for follow up post void residual with catheter in place  He states it isn't draining* managed by Dr Acevedo Flow:  Patient will hydrate with water and avoid - soda and juices  Wyonia Caller He will present next week for void trial       Assessment:      Vitals:    02/14/23 1421   BP: 148/80   Pulse: 63   SpO2: 96%   Weight: 80 3 kg (177 lb)   Height: 5' 5" (1 651 m)           Patient has catheter in place but he doesn't feel as it's draining even though urine in bag  He has a low grade fever of 99  Post void residual measured via bladder scan to be 50 mL  Urine is orange - he is taking pyridium  He states urine leaking out around catheter - explained could be spasms which is normal with catheter  Flush catheter with 200 ml of sterile water - catheter drained well  He was changing his bag every other day - I told him to keep using the same one until he come back next week for void trial   Also discussed oxybutynin with Sorin Nieves and he said it make make him fail void trial so not prescribed        Recent Results (from the past 6 hour(s))   POCT Measure PVR    Collection Time: 02/14/23  3:45 PM   Result Value Ref Range    POST-VOID RESIDUAL VOLUME, ML POC 50 mL         Milagros Bean RN

## 2023-02-20 ENCOUNTER — PROCEDURE VISIT (OUTPATIENT)
Dept: UROLOGY | Facility: AMBULATORY SURGERY CENTER | Age: 73
End: 2023-02-20

## 2023-02-20 VITALS
SYSTOLIC BLOOD PRESSURE: 132 MMHG | DIASTOLIC BLOOD PRESSURE: 72 MMHG | WEIGHT: 176 LBS | BODY MASS INDEX: 29.32 KG/M2 | HEIGHT: 65 IN | HEART RATE: 59 BPM

## 2023-02-20 DIAGNOSIS — R33.9 URINARY RETENTION: Primary | ICD-10-CM

## 2023-02-20 LAB
POST-VOID RESIDUAL VOLUME, ML POC: 232 ML
POST-VOID RESIDUAL VOLUME, ML POC: 93 ML

## 2023-02-20 NOTE — PROGRESS NOTES
2/20/2023    Keshia Shafer  1950  [id number]    Diagnosis  Chief Complaint    Pacheco catheter removal         Patient presents for void trial managed by Dr Gardenia Dakins  Follow up as scheduled with Dr Shi Bailey for a Cysto  Educational handout provided and reviewed of s/s he may experience after catheter removal  ER precautions reviewed  Advised to contact the office in the meantime with any questions or concerns  Procedure Pacheco removal/voiding trial    Pacheco catheter removed after deflation of an intact balloon  Patient tolerated well  Encouraged patient to hydrate well and return this afternoon for post void residual   he knows he may return early if uncomfortable and unable to urinate  Patient agrees to this plan  Patient returned this afternoon  Patient states able to void  Patient stated he voided around 2:30 PM  PVR was performed with bladder scanner and noted to be 232 ml  Advised patient to void and he was able to void in the office  Bladder ultrasound was performed again and PVR measured 93ml  Advised of follow up appointment with Dr Shi Bailey for a cysto  Advised of ER precautions and advised to contact the office in the meantime with any questions or concerns       Recent Results (from the past 4 hour(s))   POCT Measure PVR    Collection Time: 02/20/23  3:03 PM   Result Value Ref Range    POST-VOID RESIDUAL VOLUME, ML POC 93 mL   POCT Measure PVR    Collection Time: 02/20/23  3:03 PM   Result Value Ref Range    POST-VOID RESIDUAL VOLUME, ML  mL     Vitals:    02/20/23 0851   BP: 132/72   BP Location: Left arm   Patient Position: Sitting   Cuff Size: Adult   Pulse: 59   Weight: 79 8 kg (176 lb)   Height: 5' 5" (1 651 m)             Suman Leyva, MICHAELN, RN

## 2023-03-02 ENCOUNTER — NURSE TRIAGE (OUTPATIENT)
Dept: OTHER | Facility: OTHER | Age: 73
End: 2023-03-02

## 2023-03-02 DIAGNOSIS — R39.9 UTI SYMPTOMS: Primary | ICD-10-CM

## 2023-03-02 DIAGNOSIS — N39.0 URINARY TRACT INFECTION WITHOUT HEMATURIA, SITE UNSPECIFIED: ICD-10-CM

## 2023-03-02 NOTE — TELEPHONE ENCOUNTER
Called and spoke with pt reports slow flow and incontinence since 02/2023 removal of martinez  PT reports feels like maybe not empty all the way  Pt concenred about retention  Also cloudy urine, denies burning with urination  No fever/chills, nausea/vomiting, no other complaints   Pt is asking if he can go back on Flomax to help with stream  Orders placed for UA/UC and pt scheduled for PVR for tomorrow

## 2023-03-02 NOTE — TELEPHONE ENCOUNTER
Regarding: possible UTI  ----- Message from Marita Osborne sent at 3/2/2023  1:13 PM EST -----  " Every since I've been dealing with this catheter I keep having issues, I believe I have a UTI again and would like an antibiotic if possible "

## 2023-03-03 ENCOUNTER — PROCEDURE VISIT (OUTPATIENT)
Dept: UROLOGY | Facility: CLINIC | Age: 73
End: 2023-03-03

## 2023-03-03 VITALS
SYSTOLIC BLOOD PRESSURE: 130 MMHG | HEIGHT: 65 IN | WEIGHT: 177 LBS | RESPIRATION RATE: 20 BRPM | BODY MASS INDEX: 29.49 KG/M2 | DIASTOLIC BLOOD PRESSURE: 80 MMHG | HEART RATE: 64 BPM

## 2023-03-03 DIAGNOSIS — R39.12 WEAK URINARY STREAM: Primary | ICD-10-CM

## 2023-03-03 DIAGNOSIS — N13.8 BPH WITH OBSTRUCTION/LOWER URINARY TRACT SYMPTOMS: Primary | ICD-10-CM

## 2023-03-03 DIAGNOSIS — N40.1 BPH WITH OBSTRUCTION/LOWER URINARY TRACT SYMPTOMS: Primary | ICD-10-CM

## 2023-03-03 LAB
BACTERIA UR QL AUTO: ABNORMAL /HPF
BILIRUB UR QL STRIP: NEGATIVE
CLARITY UR: ABNORMAL
COLOR UR: ABNORMAL
GLUCOSE UR STRIP-MCNC: ABNORMAL MG/DL
HGB UR QL STRIP.AUTO: ABNORMAL
KETONES UR STRIP-MCNC: NEGATIVE MG/DL
LEUKOCYTE ESTERASE UR QL STRIP: ABNORMAL
NITRITE UR QL STRIP: NEGATIVE
NON-SQ EPI CELLS URNS QL MICRO: ABNORMAL /HPF
PH UR STRIP.AUTO: 6 [PH]
POST-VOID RESIDUAL VOLUME, ML POC: 284 ML
PROT UR STRIP-MCNC: ABNORMAL MG/DL
RBC #/AREA URNS AUTO: ABNORMAL /HPF
SP GR UR STRIP.AUTO: 1.02 (ref 1–1.03)
UROBILINOGEN UR STRIP-ACNC: <2 MG/DL
WBC #/AREA URNS AUTO: ABNORMAL /HPF

## 2023-03-03 RX ORDER — TAMSULOSIN HYDROCHLORIDE 0.4 MG/1
0.4 CAPSULE ORAL
Qty: 30 CAPSULE | Refills: 3 | Status: SHIPPED | OUTPATIENT
Start: 2023-03-03 | End: 2023-07-25 | Stop reason: ALTCHOICE

## 2023-03-03 NOTE — PROGRESS NOTES
3/3/2023  Tasneem Pinto is a 67 y o  male  [id number]    Diagnosis:  Chief Complaint    Benign Prostatic Hypertrophy         Patient presents for follow up post void residual  managed by Dr Winston Oliva:  Keep scheduled appointment with Dr Paula Basurto on 03/30/2023 for cystoscopy  Begin Flomax today  Urine culture and micro obtained  Await results  Assessment:  Patient states since martinez removal on 2/20/2023 his urinary flow has weakened  Denies abdominal distention  Patient complaining of cloudy urine but denies dysuria, urinary frequency or urgency  No fever/chills or hematuria  Discussed patient with Adrian Pugh  Plan to restart Flomax  Obtain urine testing  Vitals:    03/03/23 1030   BP: 130/80   Pulse: 64   Resp: 20   Weight: 80 3 kg (177 lb)   Height: 5' 5" (1 651 m)           Patient voided 100 mL in the office    Post void residual measured via bladder scan to be 284 mL    Recent Results (from the past 6 hour(s))   POCT Measure PVR    Collection Time: 03/03/23 11:04 AM   Result Value Ref Range    POST-VOID RESIDUAL VOLUME, ML  mL         Bernadene Litten, RN

## 2023-03-05 LAB — BACTERIA UR CULT: ABNORMAL

## 2023-03-06 RX ORDER — SULFAMETHOXAZOLE AND TRIMETHOPRIM 800; 160 MG/1; MG/1
1 TABLET ORAL EVERY 12 HOURS SCHEDULED
Qty: 10 TABLET | Refills: 0 | Status: SHIPPED | OUTPATIENT
Start: 2023-03-06 | End: 2023-03-11

## 2023-03-06 RX ORDER — CIPROFLOXACIN 500 MG/1
500 TABLET, FILM COATED ORAL EVERY 12 HOURS SCHEDULED
Qty: 10 TABLET | Refills: 0 | Status: SHIPPED | OUTPATIENT
Start: 2023-03-06 | End: 2023-03-06 | Stop reason: ALTCHOICE

## 2023-03-06 NOTE — TELEPHONE ENCOUNTER
Spoke to patient and advised of AP's note  Patient questioned if he can go for urine testing afterwards to make sure infection is gone, due to recent issues patient has had  Urine testing has been ordered  Advised to go for urine testing 3-5 days after the antibiotic is completed  He is understanding

## 2023-03-06 NOTE — TELEPHONE ENCOUNTER
Antibiotic chosen based on final sensitivity  Can switch antibiotic to Bactrim  Prescription sent to his pharmacy  Contact precautions  entail proper hand hygiene after voiding

## 2023-03-06 NOTE — TELEPHONE ENCOUNTER
Pt called in stating he was told someone from the office would follow up with him after his procedure, but he hasn't heard from anyone  He's requesting a call back

## 2023-03-06 NOTE — TELEPHONE ENCOUNTER
Results of urine culture from 3/3/2023 were positive for infection  Given his report of symptoms, prescription for Cipro was sent to his pharmacy

## 2023-03-06 NOTE — TELEPHONE ENCOUNTER
Spoke to patient and gave him CRNP AS recommendations  While on the phone patient stated he was already on Cipro that he had from his PCP and nothing is helping  Patient also questioned why on the results that patient requires precautions  60,000-69,000 cfu/ml Enterobacter cloacae complex Abnormal     Multi-Drug Resistant Organism   Please note: This patient requires contact precautions  This organism has been edited  The previous result was Gram Negative Jayy Enteric Like on 3/4/2023 at [facility] [address] EST     Will forward back to provider pool for advisement

## 2023-03-14 ENCOUNTER — APPOINTMENT (OUTPATIENT)
Dept: LAB | Age: 73
End: 2023-03-14

## 2023-03-14 DIAGNOSIS — E86.0 DEHYDRATION: ICD-10-CM

## 2023-03-14 DIAGNOSIS — N17.9 ACUTE RENAL FAILURE, UNSPECIFIED ACUTE RENAL FAILURE TYPE (HCC): ICD-10-CM

## 2023-03-14 LAB
ALBUMIN SERPL BCP-MCNC: 4 G/DL (ref 3.5–5)
ALP SERPL-CCNC: 52 U/L (ref 46–116)
ALT SERPL W P-5'-P-CCNC: 26 U/L (ref 12–78)
ANION GAP SERPL CALCULATED.3IONS-SCNC: 3 MMOL/L (ref 4–13)
AST SERPL W P-5'-P-CCNC: 25 U/L (ref 5–45)
BILIRUB SERPL-MCNC: 0.67 MG/DL (ref 0.2–1)
BUN SERPL-MCNC: 20 MG/DL (ref 5–25)
CALCIUM SERPL-MCNC: 10 MG/DL (ref 8.3–10.1)
CHLORIDE SERPL-SCNC: 110 MMOL/L (ref 96–108)
CO2 SERPL-SCNC: 26 MMOL/L (ref 21–32)
CREAT SERPL-MCNC: 1.42 MG/DL (ref 0.6–1.3)
GFR SERPL CREATININE-BSD FRML MDRD: 48 ML/MIN/1.73SQ M
GLUCOSE P FAST SERPL-MCNC: 128 MG/DL (ref 65–99)
POTASSIUM SERPL-SCNC: 4.5 MMOL/L (ref 3.5–5.3)
PROT SERPL-MCNC: 7.1 G/DL (ref 6.4–8.4)
SODIUM SERPL-SCNC: 139 MMOL/L (ref 135–147)

## 2023-03-16 ENCOUNTER — APPOINTMENT (OUTPATIENT)
Dept: LAB | Age: 73
End: 2023-03-16

## 2023-03-16 ENCOUNTER — TELEPHONE (OUTPATIENT)
Dept: OTHER | Facility: OTHER | Age: 73
End: 2023-03-16

## 2023-03-16 DIAGNOSIS — R39.9 UTI SYMPTOMS: ICD-10-CM

## 2023-03-16 DIAGNOSIS — N40.1 BENIGN PROSTATIC HYPERPLASIA WITH WEAK URINARY STREAM: ICD-10-CM

## 2023-03-16 DIAGNOSIS — R39.12 BENIGN PROSTATIC HYPERPLASIA WITH WEAK URINARY STREAM: ICD-10-CM

## 2023-03-16 DIAGNOSIS — R10.9 FLANK PAIN: Primary | ICD-10-CM

## 2023-03-16 LAB
BACTERIA UR QL AUTO: ABNORMAL /HPF
BILIRUB UR QL STRIP: NEGATIVE
CAOX CRY URNS QL MICRO: ABNORMAL /HPF
CLARITY UR: ABNORMAL
COLOR UR: YELLOW
GLUCOSE UR STRIP-MCNC: NEGATIVE MG/DL
HGB UR QL STRIP.AUTO: ABNORMAL
KETONES UR STRIP-MCNC: NEGATIVE MG/DL
LEUKOCYTE ESTERASE UR QL STRIP: ABNORMAL
MUCOUS THREADS UR QL AUTO: ABNORMAL
NITRITE UR QL STRIP: NEGATIVE
NON-SQ EPI CELLS URNS QL MICRO: ABNORMAL /HPF
PH UR STRIP.AUTO: 5.5 [PH]
PROT UR STRIP-MCNC: ABNORMAL MG/DL
RBC #/AREA URNS AUTO: ABNORMAL /HPF
SP GR UR STRIP.AUTO: 1.02 (ref 1–1.03)
UROBILINOGEN UR STRIP-ACNC: <2 MG/DL
WBC #/AREA URNS AUTO: ABNORMAL /HPF

## 2023-03-16 NOTE — TELEPHONE ENCOUNTER
Patient had his urine tested today and the results of the UA are back, still waiting on urine culture  He would like to know if an antibiotic will be started before the culture comes back

## 2023-03-17 ENCOUNTER — TELEPHONE (OUTPATIENT)
Dept: OTHER | Facility: OTHER | Age: 73
End: 2023-03-17

## 2023-03-17 DIAGNOSIS — N39.0 URINARY TRACT INFECTION WITHOUT HEMATURIA, SITE UNSPECIFIED: Primary | ICD-10-CM

## 2023-03-17 RX ORDER — SULFAMETHOXAZOLE AND TRIMETHOPRIM 800; 160 MG/1; MG/1
1 TABLET ORAL EVERY 12 HOURS SCHEDULED
Qty: 10 TABLET | Refills: 0 | Status: SHIPPED | OUTPATIENT
Start: 2023-03-17 | End: 2023-03-22

## 2023-03-17 NOTE — TELEPHONE ENCOUNTER
Anai Meza from [facility] calls in for patient in regards to refilling medication Bactrim  Per patient he was told it would be sent today and its not at the pharmacy  Please reach out to the patient to discuss  Bactrim was last prescribed on 3 6 2023  Explained I would send a message to the urology office in regards to the prescription

## 2023-03-17 NOTE — TELEPHONE ENCOUNTER
Called and left VM for patient  Office number was left  When patient calls back please advise "Urine culture pending  Due to upcoming weekend, and report of symptoms, Bactrim sent to pharmacy based on prior cultures  He was noted to have calcium oxalate crystals on UA  Recommend CT renal stone study for further evaluation    Please call patient to assist with scheduling  "    Please give central schedule number for [facility] [phone]

## 2023-03-17 NOTE — TELEPHONE ENCOUNTER
Called and spoke with patient regarding urinalysis results  Patient reports decreased urinary flow, cloudy urine, and occasional chills  Denies fevers, abdominal pain, or visible blood in the urine  Reports he was feeling good for about 3 days after completing most recent Bactrim course, however now symptoms have returned  Patient requesting antibiotic prior to weekend- he uses [facility] in [place] on file  Has upcoming cystoscopy with Dr Paula Basurto  Will forward to provider for review

## 2023-03-17 NOTE — TELEPHONE ENCOUNTER
Urine culture pending  Due to upcoming weekend, and report of symptoms, Bactrim sent to pharmacy based on prior cultures  He was noted to have calcium oxalate crystals on UA  Recommend CT renal stone study for further evaluation  Please call patient to assist with scheduling

## 2023-03-17 NOTE — TELEPHONE ENCOUNTER
Patient called back and I read him the information below as requested  He understood and agreed to schedule CT

## 2023-03-18 ENCOUNTER — TELEPHONE (OUTPATIENT)
Dept: UROLOGY | Facility: MEDICAL CENTER | Age: 73
End: 2023-03-18

## 2023-03-18 DIAGNOSIS — N39.0 RECURRENT UTI: Primary | ICD-10-CM

## 2023-03-18 LAB — BACTERIA UR CULT: ABNORMAL

## 2023-03-18 RX ORDER — LEVOFLOXACIN 500 MG/1
500 TABLET, FILM COATED ORAL EVERY 24 HOURS
Qty: 10 TABLET | Refills: 0 | Status: SHIPPED | OUTPATIENT
Start: 2023-03-18 | End: 2023-03-28

## 2023-03-19 NOTE — TELEPHONE ENCOUNTER
Urine culture resulted- pt with resistance to Bactrim  Will place on Levaquin   Prescription sent to pharmacy on file

## 2023-03-20 NOTE — TELEPHONE ENCOUNTER
Call placed to patient and spoke with him  Informed him of the recommendations of the CRNP  PT is aware and will take medication as prescribed

## 2023-03-30 ENCOUNTER — PROCEDURE VISIT (OUTPATIENT)
Dept: UROLOGY | Facility: AMBULATORY SURGERY CENTER | Age: 73
End: 2023-03-30

## 2023-03-30 VITALS
OXYGEN SATURATION: 98 % | BODY MASS INDEX: 29.45 KG/M2 | DIASTOLIC BLOOD PRESSURE: 80 MMHG | WEIGHT: 177 LBS | HEART RATE: 60 BPM | SYSTOLIC BLOOD PRESSURE: 150 MMHG

## 2023-03-30 DIAGNOSIS — N35.114 POSTINFECTIVE STRICTURE OF ANTERIOR URETHRA: ICD-10-CM

## 2023-03-30 DIAGNOSIS — N18.30 STAGE 3 CHRONIC KIDNEY DISEASE, UNSPECIFIED WHETHER STAGE 3A OR 3B CKD (HCC): ICD-10-CM

## 2023-03-30 DIAGNOSIS — N13.8 BPH WITH OBSTRUCTION/LOWER URINARY TRACT SYMPTOMS: Primary | ICD-10-CM

## 2023-03-30 DIAGNOSIS — R39.12 BENIGN PROSTATIC HYPERPLASIA WITH WEAK URINARY STREAM: ICD-10-CM

## 2023-03-30 DIAGNOSIS — N39.0 RECURRENT UTI: ICD-10-CM

## 2023-03-30 DIAGNOSIS — N40.1 BPH WITH OBSTRUCTION/LOWER URINARY TRACT SYMPTOMS: Primary | ICD-10-CM

## 2023-03-30 DIAGNOSIS — N40.1 BENIGN PROSTATIC HYPERPLASIA WITH WEAK URINARY STREAM: ICD-10-CM

## 2023-03-30 LAB
POST-VOID RESIDUAL VOLUME, ML POC: 143 ML
SL AMB  POCT GLUCOSE, UA: NORMAL
SL AMB LEUKOCYTE ESTERASE,UA: NORMAL
SL AMB POCT BILIRUBIN,UA: NORMAL
SL AMB POCT BLOOD,UA: NORMAL
SL AMB POCT CLARITY,UA: CLEAR
SL AMB POCT COLOR,UA: NORMAL
SL AMB POCT KETONES,UA: NORMAL
SL AMB POCT NITRITE,UA: NORMAL
SL AMB POCT PH,UA: 6
SL AMB POCT SPECIFIC GRAVITY,UA: 1.02
SL AMB POCT URINE PROTEIN: NORMAL
SL AMB POCT UROBILINOGEN: 0.2

## 2023-03-30 RX ORDER — LEVOFLOXACIN 250 MG/1
250 TABLET ORAL DAILY
Qty: 10 TABLET | Refills: 0 | Status: SHIPPED | OUTPATIENT
Start: 2023-03-30 | End: 2023-04-09

## 2023-03-30 NOTE — PROGRESS NOTES
Assessment/Plan:    Stage 3 chronic kidney disease, unspecified whether stage 3a or 3b CKD (HCC)  Lab Results   Component Value Date    EGFR 48 03/14/2023    EGFR 41 02/05/2023    EGFR 62 01/11/2023    CREATININE 1 42 (H) 03/14/2023    CREATININE 1 61 (H) 02/05/2023    CREATININE 1 16 01/11/2023     Creatinine is improved recently vs Feb 2023 but still not at baseline of 1 3      Benign prostatic hyperplasia with weak urinary stream  He has an open prostatic fossa suggesting greenlight surgery as done in appropriate job for his prostate  I think his urethral stricture disease is contributing to his poor emptying likely in combination with some component of myogenic bladder dysfunction  Postinfective stricture of anterior urethra  The patient was noted to have a narrow urethra at time of greenlight surgery requiring dilation and cystoscopy today showed a few areas of short flimsy strictures  I think he may benefit from CIC to help keep his urethra patent  The goal would be to do this as infrequently as possible but would likely start performing every day or every other day with a 16 Japanese straight catheter and then tapering to hopefully become once a week or once every few weeks  Recurrent UTI  He has MDRO Enterobacter  He would like a repeat urine culture in a few days to see if he is cleared his infection  This is appropriate  However if his infection has not cleared or he has symptoms he also wants a prescription for Levaquin  This is not unreasonable but I am concerned about his bacteria developing resistance to Levaquin at which point we will not have any good oral options remaining  We discussed the role for infectious disease consultation to help guide future antibiotic decision making and he is amenable to seeing them          Subjective:      Patient ID: Christian Munoz is a 67 y o  male      HPI     Christian Munoz is a 67 y o  male here with LUTS and recurrent MDRO UTIs with hx of Greenlight surgery x 2  The pt had green light laser photo vaporization in 2007 by Dr Willi Aguilar for recurrent UTIs and elevated postvoid residual   The patient apparently did well for quite some time but then began to have issues with UTI and voiding issues again        He was seen in our clinic in 2022 and voiced issues of a weaker stream and frequency  He denied dysuria, gross hematuria or incontinence  He was started on Tamsulosin which he originally thought helped his voiding       PVRs in 2022 ranged 200 to 592cc with average of 400cc   He  refused catheterizations  Also refused cystoscopy  He was amenable TRUS to measure prostate size which was 30g      His renal function was stable and RBUS Aug 19 2022 did not show any evidence of hydronephrosis but had PVR of 440cc  Patient elected for greenlight laser TURP performed 12/12/2022 with Dr Rosi Abernathy  Of note urethra was seen to be diffusely narrow and had to be dilated  Pre op ucx <20k Alpha hemolytic strep  Urine culture 1/20/23 <10k CONs       When seen Jan 31 2023 he reported urinary frequency, urgency and dysuria  Bladder scan  mL  Ucx grew <10k GPC  He went to the ER in retention on 2/5/23, catheter placed, removed 2/20/23 with PVR of 93cc  He developed a MDRO enterobacter UTI on 3/3/23 treated with cipro and changed to bactrim but had GI issues  Repeat Ucx 3/16/23 again grew MDRO enterobacter and he was put on levaquin  Most recent Cr is 1 4 from 1 6 in Feb 2023  He has CT without contrast scheduled  Today the patient reports his stream is weak and he has some issues with frequency  He thinks his infection is better overall though  Cystoscopy showed multiple short Felegy urethral strictures  Prostatic fossa had evidence of TUR defect  Bladder had trabeculations but otherwise unremarkable      Past Surgical History:   Procedure Laterality Date   • APPENDECTOMY     • CHOLECYSTECTOMY     • COLONOSCOPY     • CORONARY ANGIOPLASTY WITH STENT PLACEMENT  2016    2 stent   • [place] LASER VAPORIZATION OF PROSTATE FOR URINE FLOW N/A 12/12/2022    Procedure: TRANSURETHRAL RESECTION OF PROSTATE W/ GREENLIGHT LASER;  Surgeon: Jcarlos Carrera MD;  Location: [address] [place] [address];  Service: Urology   • TRANSURETHRAL RESECTION OF PROSTATE          Past Medical History:   Diagnosis Date   • Asthma    • CPAP (continuous positive airway pressure) dependence    • Diabetes mellitus ([address] )    • GERD (gastroesophageal reflux disease)    • Hyperlipidemia    • Hypertension    • Kidney stone    • Myocardial infarction [facility])    • Pneumonia    • Sleep apnea              Review of Systems   Constitutional: Negative for chills and fever  HENT: Negative for ear pain and sore throat  Eyes: Negative for pain and visual disturbance  Respiratory: Negative for cough and shortness of breath  Cardiovascular: Negative for chest pain and palpitations  Gastrointestinal: Negative for abdominal pain and vomiting  Genitourinary: Positive for difficulty urinating and frequency  Negative for hematuria  Musculoskeletal: Negative for arthralgias and back pain  Skin: Negative for color change and rash  Neurological: Negative for seizures and syncope  All other systems reviewed and are negative  Objective:      /80 (BP Location: Left arm, Patient Position: Sitting, Cuff Size: Adult)   Pulse 60   Wt 80 3 kg (177 lb)   SpO2 98%   BMI 29 45 kg/m²     Lab Results   Component Value Date    PSA 0 3 06/08/2022    PSA 0 6 05/05/2022    PSA 0 6 03/24/2022    PSA 0 5 01/06/2021    PSA 0 6 10/04/2019    PSA 0 4 06/07/2017          Physical Exam  Vitals reviewed  Constitutional:       Appearance: Normal appearance  He is normal weight  HENT:      Head: Normocephalic and atraumatic  Eyes:      Pupils: Pupils are equal, round, and reactive to light  Abdominal:      General: Abdomen is flat  Neurological:      General: No focal deficit present        Mental Status: He is "alert and oriented to person, place, and time  Psychiatric:         Mood and Affect: Mood normal          Thought Content: Thought content normal                  Cystoscopy     Date/Time 3/30/2023 2:05 PM     Performed by  Yelena Ugalde MD     Authorized by Yelena Ugalde MD      Universal Protocol:  Consent: Written consent obtained  Risks and benefits: risks, benefits and alternatives were discussed  Consent given by: patient  Time out: Immediately prior to procedure a \"time out\" was called to verify the correct patient, procedure, equipment, support staff and site/side marked as required  Patient understanding: patient states understanding of the procedure being performed  Patient consent: the patient's understanding of the procedure matches consent given  Procedure consent: procedure consent matches procedure scheduled  Patient identity confirmed: verbally with patient        Procedure Details:  Procedure type: cystoscopy    Patient tolerance: Patient tolerated the procedure well with no immediate complications    Additional Procedure Details: A time-out was performed identifying the correct patient site and procedure  A MA chaperone was in the room  A flexible cystoscope was introduced into the urethra  The pendulous urethra had 2 flimsy urethral strictures each approximately 8Fr in diameter  These were easily traversed with the cystoscope  The prostatic urethra showed showed evidence of prior TUR defect and appeared quite open from the verumontanum to bladder neck  The bladder did not have any lesions concerning for malignancy  There were mild to moderate trabeculations and no diverticula  The ureteral orifices were in orthotopic position        Orders  Orders Placed This Encounter   Procedures   • Cystoscopy     This order was created via procedure documentation   • Urine culture     Standing Status:   Future     Standing Expiration Date:   3/30/2024     Order Specific Question:   Release to " patient through Mychart     Answer:   Immediate   • Ambulatory Referral to Infectious Disease     Standing Status:   Future     Standing Expiration Date:   3/30/2024     Referral Priority:   Routine     Referral Type:   Consult - AMB     Referral Reason:   Specialty Services Required     Requested Specialty:   Infectious Diseases     Number of Visits Requested:   1     Expiration Date:   3/30/2024   • POCT urine dip   • POCT Measure PVR

## 2023-03-30 NOTE — ASSESSMENT & PLAN NOTE
He has an open prostatic fossa suggesting greenlight surgery as done in appropriate job for his prostate  I think his urethral stricture disease is contributing to his poor emptying likely in combination with some component of myogenic bladder dysfunction

## 2023-03-30 NOTE — ASSESSMENT & PLAN NOTE
The patient was noted to have a narrow urethra at time of greenlight surgery requiring dilation and cystoscopy today showed a few areas of short flimsy strictures  I think he may benefit from CIC to help keep his urethra patent  The goal would be to do this as infrequently as possible but would likely start performing every day or every other day with a 16 Armenian straight catheter and then tapering to hopefully become once a week or once every few weeks

## 2023-03-30 NOTE — ASSESSMENT & PLAN NOTE
He has MDRO Enterobacter  He would like a repeat urine culture in a few days to see if he is cleared his infection  This is appropriate  However if his infection has not cleared or he has symptoms he also wants a prescription for Levaquin  This is not unreasonable but I am concerned about his bacteria developing resistance to Levaquin at which point we will not have any good oral options remaining      We discussed the role for infectious disease consultation to help guide future antibiotic decision making and he is amenable to seeing them

## 2023-03-30 NOTE — ASSESSMENT & PLAN NOTE
Lab Results   Component Value Date    EGFR 48 03/14/2023    EGFR 41 02/05/2023    EGFR 62 01/11/2023    CREATININE 1 42 (H) 03/14/2023    CREATININE 1 61 (H) 02/05/2023    CREATININE 1 16 01/11/2023     Creatinine is improved recently vs Feb 2023 but still not at baseline of 1 3

## 2023-04-04 ENCOUNTER — HOSPITAL ENCOUNTER (OUTPATIENT)
Dept: CT IMAGING | Facility: HOSPITAL | Age: 73
Discharge: HOME | End: 2023-04-04

## 2023-04-04 DIAGNOSIS — R10.9 FLANK PAIN: ICD-10-CM

## 2023-05-10 ENCOUNTER — APPOINTMENT (OUTPATIENT)
Dept: LAB | Age: 73
End: 2023-05-10

## 2023-05-10 DIAGNOSIS — R53.83 FATIGUE, UNSPECIFIED TYPE: ICD-10-CM

## 2023-05-10 DIAGNOSIS — E11.9 TYPE 2 DIABETES MELLITUS WITHOUT COMPLICATION, WITHOUT LONG-TERM CURRENT USE OF INSULIN (HCC): ICD-10-CM

## 2023-05-10 DIAGNOSIS — E78.5 HYPERLIPIDEMIA, UNSPECIFIED HYPERLIPIDEMIA TYPE: ICD-10-CM

## 2023-05-10 LAB
ALBUMIN SERPL BCP-MCNC: 3.9 G/DL (ref 3.5–5)
ALP SERPL-CCNC: 54 U/L (ref 46–116)
ALT SERPL W P-5'-P-CCNC: 27 U/L (ref 12–78)
ANION GAP SERPL CALCULATED.3IONS-SCNC: 2 MMOL/L (ref 4–13)
AST SERPL W P-5'-P-CCNC: 25 U/L (ref 5–45)
BASOPHILS # BLD AUTO: 0.04 THOUSANDS/ÂΜL (ref 0–0.1)
BASOPHILS NFR BLD AUTO: 1 % (ref 0–1)
BILIRUB SERPL-MCNC: 0.73 MG/DL (ref 0.2–1)
BUN SERPL-MCNC: 16 MG/DL (ref 5–25)
CALCIUM SERPL-MCNC: 9.8 MG/DL (ref 8.3–10.1)
CHLORIDE SERPL-SCNC: 111 MMOL/L (ref 96–108)
CHOLEST SERPL-MCNC: 87 MG/DL
CO2 SERPL-SCNC: 25 MMOL/L (ref 21–32)
CREAT SERPL-MCNC: 1.13 MG/DL (ref 0.6–1.3)
EOSINOPHIL # BLD AUTO: 0.16 THOUSAND/ÂΜL (ref 0–0.61)
EOSINOPHIL NFR BLD AUTO: 3 % (ref 0–6)
ERYTHROCYTE [DISTWIDTH] IN BLOOD BY AUTOMATED COUNT: 12.9 % (ref 11.6–15.1)
EST. AVERAGE GLUCOSE BLD GHB EST-MCNC: 131 MG/DL
GFR SERPL CREATININE-BSD FRML MDRD: 64 ML/MIN/1.73SQ M
GLUCOSE P FAST SERPL-MCNC: 121 MG/DL (ref 65–99)
HBA1C MFR BLD: 6.2 %
HCT VFR BLD AUTO: 40.6 % (ref 36.5–49.3)
HDLC SERPL-MCNC: 30 MG/DL
HGB BLD-MCNC: 14.1 G/DL (ref 12–17)
IMM GRANULOCYTES # BLD AUTO: 0.04 THOUSAND/UL (ref 0–0.2)
IMM GRANULOCYTES NFR BLD AUTO: 1 % (ref 0–2)
LDLC SERPL CALC-MCNC: 33 MG/DL (ref 0–100)
LYMPHOCYTES # BLD AUTO: 1.62 THOUSANDS/ÂΜL (ref 0.6–4.47)
LYMPHOCYTES NFR BLD AUTO: 31 % (ref 14–44)
MCH RBC QN AUTO: 33.3 PG (ref 26.8–34.3)
MCHC RBC AUTO-ENTMCNC: 34.7 G/DL (ref 31.4–37.4)
MCV RBC AUTO: 96 FL (ref 82–98)
MONOCYTES # BLD AUTO: 0.42 THOUSAND/ÂΜL (ref 0.17–1.22)
MONOCYTES NFR BLD AUTO: 8 % (ref 4–12)
NEUTROPHILS # BLD AUTO: 2.92 THOUSANDS/ÂΜL (ref 1.85–7.62)
NEUTS SEG NFR BLD AUTO: 56 % (ref 43–75)
NONHDLC SERPL-MCNC: 57 MG/DL
NRBC BLD AUTO-RTO: 0 /100 WBCS
PLATELET # BLD AUTO: 167 THOUSANDS/UL (ref 149–390)
PMV BLD AUTO: 11.6 FL (ref 8.9–12.7)
POTASSIUM SERPL-SCNC: 4.1 MMOL/L (ref 3.5–5.3)
PROT SERPL-MCNC: 6.8 G/DL (ref 6.4–8.4)
RBC # BLD AUTO: 4.23 MILLION/UL (ref 3.88–5.62)
SODIUM SERPL-SCNC: 138 MMOL/L (ref 135–147)
T3 SERPL-MCNC: 0.9 NG/ML (ref 0.6–1.8)
T4 FREE SERPL-MCNC: 0.92 NG/DL (ref 0.76–1.46)
TRIGL SERPL-MCNC: 118 MG/DL
TSH SERPL DL<=0.05 MIU/L-ACNC: 3.56 UIU/ML (ref 0.45–4.5)
WBC # BLD AUTO: 5.2 THOUSAND/UL (ref 4.31–10.16)

## 2023-05-23 ENCOUNTER — OFFICE VISIT (OUTPATIENT)
Dept: SLEEP CENTER | Facility: CLINIC | Age: 73
End: 2023-05-23

## 2023-05-23 VITALS
DIASTOLIC BLOOD PRESSURE: 72 MMHG | HEIGHT: 65 IN | BODY MASS INDEX: 30.02 KG/M2 | WEIGHT: 180.2 LBS | OXYGEN SATURATION: 97 % | HEART RATE: 56 BPM | SYSTOLIC BLOOD PRESSURE: 138 MMHG

## 2023-05-23 DIAGNOSIS — G47.33 OSA ON CPAP: Primary | ICD-10-CM

## 2023-05-23 DIAGNOSIS — Z99.89 OSA ON CPAP: Primary | ICD-10-CM

## 2023-05-23 NOTE — PROGRESS NOTES
Progress Note - Sleep Medicine  Nadir Mauricio 67 y o  male MRN: [id number]       Impression & Plan:   79-year-old male with past medical history of diabetes, HRN, GERD, asthma, HLD, and MOHIT on CPAP who presents today for follow-up of MOHIT  1  MOHIT on CPAP: He was last seen by sleep medicine one year ago  Review of the notes show that pressure was changed from 4-20 to 8-12 at that time  He says that he feels sleepy and tired during the day and takes naps every day  He got a new machine through the recall last year  It seems like he did not switch his modem from his old machine to the new machine as it is not transmitting as data  Last compliance result on the system is from June 2022  -Will change pressures to 9-15 cm H2O today    -He has to go to [facility] health office to get a new modem so that his device can transmit the data    -Follow up in 3 months  Diagnoses and all orders for this visit:    MOHIT on CPAP  -     PAP DME Pressure Change          ______________________________________________________________________    HPI:    Nadir Mauricio is a 79-year-old male with past medical history of diabetes, HRN, GERD, asthma, HLD, and MOHIT on CPAP who presents today for follow-up of MOHIT  Patient has been diagnosed with MOHIT in March 2019 after a diagnostic PSG and has been on CPAP since that time  He was last seen by sleep medicine one year ago  Review of the notes show that pressure was changed from 4-20 to 8-12 at that time  He says that he feels sleepy and tired during the day and takes naps every day  His ESS is 18/24  He is not getting significant benefit with CPAP despite using it every night  He got a new machine through the recall last year  It seems like he did not switch his modem from his old machine to the new machine as it is not transmitting as data  Last compliance result on the system is from June 2022     We could not find his report on the system even after searching through the serial number on his machine as he did not switch his modem from his old machine  Sleep schedule: He goes to bed at 11 PM and falls asleep in 20 mins  He wakes up at 8   AM and does not feel fully rested  He does not wake up in the middle of night except on occasional nights ( once a week) to use the bathroom  He takes naps every day and sometimes twice a day up to 20 minutes  He does not take medications to help him fall asleep  Review of Systems:  Review of Systems   Constitutional: Positive for fatigue  Negative for chills and fever  HENT: Negative for congestion, mouth sores and nosebleeds  Eyes: Negative for visual disturbance  Respiratory: Negative for apnea, cough and choking  Cardiovascular: Negative for chest pain and palpitations  Gastrointestinal: Negative for abdominal distention  Endocrine: Negative for polyuria  Genitourinary: Negative for dysuria and frequency  Neurological: Negative for headaches  Psychiatric/Behavioral: Negative for sleep disturbance           Social history updates:  Social History     Tobacco Use   Smoking Status Never   Smokeless Tobacco Never     Social History     Socioeconomic History   • Marital status: /Civil Union     Spouse name: Not on file   • Number of children: Not on file   • Years of education: Not on file   • Highest education level: Not on file   Occupational History   • Not on file   Tobacco Use   • Smoking status: Never   • Smokeless tobacco: Never   Vaping Use   • Vaping Use: Never used   Substance and Sexual Activity   • Alcohol use: Yes     Comment: occasional once or  twice a mothn a beer   • Drug use: Never   • Sexual activity: Not Currently   Other Topics Concern   • Not on file   Social History Narrative   • Not on file     Social Determinants of Health     Financial Resource Strain: Not on file   Food Insecurity: Not on file   Transportation Needs: Not on file   Physical Activity: Not on file   Stress: Not on file   Social Connections: "Not on file   Intimate Partner Violence: Not on file   Housing Stability: Not on file       PhysicalExamination:  Vitals:   /72   Pulse 56   Ht 5' 5\" (1 651 m)   Wt 81 7 kg (180 lb 3 2 oz)   SpO2 97%   BMI 29 99 kg/m²     Physical Exam  General: Awake alert and oriented x 3, conversant without conversational dyspnea, NAD, normal affect  HEENT:  PERRL, Sclera noninjected, nonicteric OU, Nares patent,  no craniofacial abnormalities, Mucous membranes, moist, no oral lesions, normal dentition, Mallampati class 4  NECK:  Trachea midline, no accessory muscle use, no stridor, no cervical or supraclavicular adenopathy, JVP not elevated  CARDIAC: Reg, single s1/S2, murmur  PULM: CTA bilaterally no wheezing, rhonchi or rales  EXT: No cyanosis, no clubbing, no edema, normal capillary refill  NEURO: no focal neurologic deficits, AAOx3, moving all extremities appropriately     Diagnostic Data:  Labs:   I personally reviewed the most recent laboratory data pertinent to today's visit  Appointment on 05/10/2023   Component Date Value   • WBC 05/10/2023 5 20    • RBC 05/10/2023 4 23    • Hemoglobin 05/10/2023 14 1    • Hematocrit 05/10/2023 40 6    • MCV 05/10/2023 96    • MCH 05/10/2023 33 3    • MCHC 05/10/2023 34 7    • RDW 05/10/2023 12 9    • MPV 05/10/2023 11 6    • Platelets 83/87/2567 167    • nRBC 05/10/2023 0    • Neutrophils Relative 05/10/2023 56    • Immat GRANS % 05/10/2023 1    • Lymphocytes Relative 05/10/2023 31    • Monocytes Relative 05/10/2023 8    • Eosinophils Relative 05/10/2023 3    • Basophils Relative 05/10/2023 1    • Neutrophils Absolute 05/10/2023 2 92    • Immature Grans Absolute 05/10/2023 0 04    • Lymphocytes Absolute 05/10/2023 1 62    • Monocytes Absolute 05/10/2023 0 42    • Eosinophils Absolute 05/10/2023 0 16    • Basophils Absolute 05/10/2023 0 04    • Sodium 05/10/2023 138    • Potassium 05/10/2023 4 1    • Chloride 05/10/2023 111 (H)    • CO2 05/10/2023 25    • ANION GAP 05/10/2023 " 2 (L)    • BUN 05/10/2023 16    • Creatinine 05/10/2023 1 13    • Glucose, Fasting 05/10/2023 121 (H)    • Calcium 05/10/2023 9 8    • AST 05/10/2023 25    • ALT 05/10/2023 27    • Alkaline Phosphatase 05/10/2023 54    • Total Protein 05/10/2023 6 8    • Albumin 05/10/2023 3 9    • Total Bilirubin 05/10/2023 0 73    • eGFR 05/10/2023 64    • Hemoglobin A1C 05/10/2023 6 2 (H)    • EAG 05/10/2023 131    • Cholesterol 05/10/2023 87    • Triglycerides 05/10/2023 118    • HDL, Direct 05/10/2023 30 (L)    • LDL Calculated 05/10/2023 33    • Non-HDL-Chol (CHOL-HDL) 05/10/2023 57    • TSH 3RD GENERATON 05/10/2023 3 560    • T3, Total 05/10/2023 0 90    • Free T4 05/10/2023 0 92    Transcribe Orders on 05/10/2023   Component Date Value   • Creatinine, Ur 05/10/2023 133 0    • Albumin,U,Random 05/10/2023 48 9 (H)    • Albumin Creat Ratio 05/10/2023 37 (H)    Procedure visit on 04/18/2023   Component Date Value   • POST-VOID RESIDUAL VOLUM* 04/18/2023 241    Appointment on 04/11/2023   Component Date Value   • Sodium 04/11/2023 140    • Potassium 04/11/2023 3 8    • Chloride 04/11/2023 109 (H)    • CO2 04/11/2023 27    • ANION GAP 04/11/2023 4    • BUN 04/11/2023 15    • Creatinine 04/11/2023 1 22    • Glucose, Fasting 04/11/2023 129 (H)    • Calcium 04/11/2023 9 4    • AST 04/11/2023 25    • ALT 04/11/2023 24    • Alkaline Phosphatase 04/11/2023 55    • Total Protein 04/11/2023 6 9    • Albumin 04/11/2023 3 8    • Total Bilirubin 04/11/2023 0 52    • eGFR 04/11/2023 58    Procedure visit on 03/30/2023   Component Date Value   • LEUKOCYTE ESTERASE,UA 03/30/2023 -    • NITRITE,UA 03/30/2023 -    • SL AMB POCT UROBILINOGEN 03/30/2023 0 2    • POCT URINE PROTEIN 03/30/2023 -    •  PH,UA 03/30/2023 6 0    • BLOOD,UA 03/30/2023 non hemo trace    • SPECIFIC GRAVITY,UA 03/30/2023 1 020    • KETONES,UA 03/30/2023 -    • BILIRUBIN,UA 03/30/2023 small    • GLUCOSE, UA 03/30/2023 250+    •  COLOR,UA 03/30/2023 straw    • CLARITY,UA 03/30/2023 clear    • POST-VOID RESIDUAL VOLUM* 03/30/2023 143        I have personally reviewed pertinent lab results  Lab Results   Component Value Date    WBC 5 20 05/10/2023    HGB 14 1 05/10/2023    HCT 40 6 05/10/2023    MCV 96 05/10/2023     05/10/2023     Lab Results   Component Value Date    CALCIUM 9 8 05/10/2023    K 4 1 05/10/2023    CO2 25 05/10/2023     (H) 05/10/2023    BUN 16 05/10/2023    CREATININE 1 13 05/10/2023     No results found for: IGE  Lab Results   Component Value Date    ALT 27 05/10/2023    AST 25 05/10/2023    ALKPHOS 54 05/10/2023     No results found for: IRON, TIBC, FERRITIN  No results found for: [id number]  No results found for: FOLATE      Sleep studies:  Diagnostic: 3/13/2019, AHI 9 5    Compliance Data:  Not available as device not connected to cloud                                          MD Lory Navarrete 72 Sleep Medicine Fellow

## 2023-05-23 NOTE — LETTER
May 23, 2023     Tyrese Alvarado [id number] [facility]    Patient: Sunny Carpenter   YOB: 1950   Date of Visit: 5/23/2023       Dear Dr Eulalio Bales: Thank you for referring Sunny Carpenter to me for evaluation  Below are my notes for this consultation  If you have questions, please do not hesitate to call me  I look forward to following your patient along with you  Sincerely,        Elizabeth Carroll,         CC: No Recipients  Ami Mercado MD  5/23/2023  2:12 PM  Attested  Progress Note - Sleep Medicine  Sunny Carpenter 67 y o  male MRN: [id number]       Impression & Plan:   76-year-old male with past medical history of diabetes, HRN, GERD, asthma, HLD, and MOHIT on CPAP who presents today for follow-up of MOHIT  1  MOHIT on CPAP: He was last seen by sleep medicine one year ago  Review of the notes show that pressure was changed from 4-20 to 8-12 at that time  He says that he feels sleepy and tired during the day and takes naps every day  He got a new machine through the recall last year  It seems like he did not switch his modem from his old machine to the new machine as it is not transmitting as data  Last compliance result on the system is from June 2022  -Will change pressures to 9-15 cm H2O today    -He has to go to [facility] health office to get a new modem so that his device can transmit the data    -Follow up in 3 months  Diagnoses and all orders for this visit:    MOHIT on CPAP  -     PAP DME Pressure Change          ______________________________________________________________________    HPI:    Sunny Carpenter is a 76-year-old male with past medical history of diabetes, HRN, GERD, asthma, HLD, and MOHIT on CPAP who presents today for follow-up of MOHIT  Patient has been diagnosed with MOHIT in March 2019 after a diagnostic PSG and has been on CPAP since that time  He was last seen by sleep medicine one year ago   Review of the notes show that pressure was changed from 4-20 to 8-12 at that time  He says that he feels sleepy and tired during the day and takes naps every day  His ESS is 18/24  He is not getting significant benefit with CPAP despite using it every night  He got a new machine through the recall last year  It seems like he did not switch his modem from his old machine to the new machine as it is not transmitting as data  Last compliance result on the system is from June 2022  We could not find his report on the system even after searching through the serial number on his machine as he did not switch his modem from his old machine  Sleep schedule: He goes to bed at 11 PM and falls asleep in 20 mins  He wakes up at 8   AM and does not feel fully rested  He does not wake up in the middle of night except on occasional nights ( once a week) to use the bathroom  He takes naps every day and sometimes twice a day up to 20 minutes  He does not take medications to help him fall asleep  Review of Systems:  Review of Systems   Constitutional: Positive for fatigue  Negative for chills and fever  HENT: Negative for congestion, mouth sores and nosebleeds  Eyes: Negative for visual disturbance  Respiratory: Negative for apnea, cough and choking  Cardiovascular: Negative for chest pain and palpitations  Gastrointestinal: Negative for abdominal distention  Endocrine: Negative for polyuria  Genitourinary: Negative for dysuria and frequency  Neurological: Negative for headaches  Psychiatric/Behavioral: Negative for sleep disturbance           Social history updates:  Social History     Tobacco Use   Smoking Status Never   Smokeless Tobacco Never     Social History     Socioeconomic History   • Marital status: /Civil Union     Spouse name: Not on file   • Number of children: Not on file   • Years of education: Not on file   • Highest education level: Not on file   Occupational History   • Not on file   Tobacco Use   • Smoking status: Never   • Smokeless "tobacco: Never   Vaping Use   • Vaping Use: Never used   Substance and Sexual Activity   • Alcohol use: Yes     Comment: occasional once or  twice a mothn a beer   • Drug use: Never   • Sexual activity: Not Currently   Other Topics Concern   • Not on file   Social History Narrative   • Not on file     Social Determinants of Health     Financial Resource Strain: Not on file   Food Insecurity: Not on file   Transportation Needs: Not on file   Physical Activity: Not on file   Stress: Not on file   Social Connections: Not on file   Intimate Partner Violence: Not on file   Housing Stability: Not on file       PhysicalExamination:  Vitals:   /72   Pulse 56   Ht 5' 5\" (1 651 m)   Wt 81 7 kg (180 lb 3 2 oz)   SpO2 97%   BMI 29 99 kg/m²     Physical Exam  General: Awake alert and oriented x 3, conversant without conversational dyspnea, NAD, normal affect  HEENT:  PERRL, Sclera noninjected, nonicteric OU, Nares patent,  no craniofacial abnormalities, Mucous membranes, moist, no oral lesions, normal dentition, Mallampati class 4  NECK:  Trachea midline, no accessory muscle use, no stridor, no cervical or supraclavicular adenopathy, JVP not elevated  CARDIAC: Reg, single s1/S2, murmur  PULM: CTA bilaterally no wheezing, rhonchi or rales  EXT: No cyanosis, no clubbing, no edema, normal capillary refill  NEURO: no focal neurologic deficits, AAOx3, moving all extremities appropriately     Diagnostic Data:  Labs:   I personally reviewed the most recent laboratory data pertinent to today's visit  Appointment on 05/10/2023   Component Date Value   • WBC 05/10/2023 5 20    • RBC 05/10/2023 4 23    • Hemoglobin 05/10/2023 14 1    • Hematocrit 05/10/2023 40 6    • MCV 05/10/2023 96    • MCH 05/10/2023 33 3    • MCHC 05/10/2023 34 7    • RDW 05/10/2023 12 9    • MPV 05/10/2023 11 6    • Platelets 14/10/1169 167    • nRBC 05/10/2023 0    • Neutrophils Relative 05/10/2023 56    • Immat GRANS % 05/10/2023 1    • Lymphocytes " Relative 05/10/2023 31    • Monocytes Relative 05/10/2023 8    • Eosinophils Relative 05/10/2023 3    • Basophils Relative 05/10/2023 1    • Neutrophils Absolute 05/10/2023 2 92    • Immature Grans Absolute 05/10/2023 0 04    • Lymphocytes Absolute 05/10/2023 1 62    • Monocytes Absolute 05/10/2023 0 42    • Eosinophils Absolute 05/10/2023 0 16    • Basophils Absolute 05/10/2023 0 04    • Sodium 05/10/2023 138    • Potassium 05/10/2023 4 1    • Chloride 05/10/2023 111 (H)    • CO2 05/10/2023 25    • ANION GAP 05/10/2023 2 (L)    • BUN 05/10/2023 16    • Creatinine 05/10/2023 1 13    • Glucose, Fasting 05/10/2023 121 (H)    • Calcium 05/10/2023 9 8    • AST 05/10/2023 25    • ALT 05/10/2023 27    • Alkaline Phosphatase 05/10/2023 54    • Total Protein 05/10/2023 6 8    • Albumin 05/10/2023 3 9    • Total Bilirubin 05/10/2023 0 73    • eGFR 05/10/2023 64    • Hemoglobin A1C 05/10/2023 6 2 (H)    • EAG 05/10/2023 131    • Cholesterol 05/10/2023 87    • Triglycerides 05/10/2023 118    • HDL, Direct 05/10/2023 30 (L)    • LDL Calculated 05/10/2023 33    • Non-HDL-Chol (CHOL-HDL) 05/10/2023 57    • TSH 3RD GENERATON 05/10/2023 3 560    • T3, Total 05/10/2023 0 90    • Free T4 05/10/2023 0 92    Transcribe Orders on 05/10/2023   Component Date Value   • Creatinine, Ur 05/10/2023 133 0    • Albumin,U,Random 05/10/2023 48 9 (H)    • Albumin Creat Ratio 05/10/2023 37 (H)    Procedure visit on 04/18/2023   Component Date Value   • POST-VOID RESIDUAL VOLUM* 04/18/2023 241    Appointment on 04/11/2023   Component Date Value   • Sodium 04/11/2023 140    • Potassium 04/11/2023 3 8    • Chloride 04/11/2023 109 (H)    • CO2 04/11/2023 27    • ANION GAP 04/11/2023 4    • BUN 04/11/2023 15    • Creatinine 04/11/2023 1 22    • Glucose, Fasting 04/11/2023 129 (H)    • Calcium 04/11/2023 9 4    • AST 04/11/2023 25    • ALT 04/11/2023 24    • Alkaline Phosphatase 04/11/2023 55    • Total Protein 04/11/2023 6 9    • Albumin 04/11/2023 3 8 • Total Bilirubin 04/11/2023 0 52    • eGFR 04/11/2023 58    Procedure visit on 03/30/2023   Component Date Value   • LEUKOCYTE ESTERASE,UA 03/30/2023 -    • NITRITE,UA 03/30/2023 -    • SL AMB POCT UROBILINOGEN 03/30/2023 0 2    • POCT URINE PROTEIN 03/30/2023 -    •  PH,UA 03/30/2023 6 0    • BLOOD,UA 03/30/2023 non hemo trace    • SPECIFIC GRAVITY,UA 03/30/2023 1 020    • KETONES,UA 03/30/2023 -    • BILIRUBIN,UA 03/30/2023 small    • GLUCOSE, UA 03/30/2023 250+    •  COLOR,UA 03/30/2023 straw    • CLARITY,UA 03/30/2023 clear    • POST-VOID RESIDUAL VOLUM* 03/30/2023 143        I have personally reviewed pertinent lab results  Lab Results   Component Value Date    WBC 5 20 05/10/2023    HGB 14 1 05/10/2023    HCT 40 6 05/10/2023    MCV 96 05/10/2023     05/10/2023     Lab Results   Component Value Date    CALCIUM 9 8 05/10/2023    K 4 1 05/10/2023    CO2 25 05/10/2023     (H) 05/10/2023    BUN 16 05/10/2023    CREATININE 1 13 05/10/2023     No results found for: IGE  Lab Results   Component Value Date    ALT 27 05/10/2023    AST 25 05/10/2023    ALKPHOS 54 05/10/2023     No results found for: IRON, TIBC, FERRITIN  No results found for: [id number]  No results found for: FOLATE      Sleep studies:  Diagnostic: 3/13/2019, AHI 9 5    Compliance Data:  Not available as device not connected to cloud  MD Lory Larios 72 Sleep Medicine Fellow  Attestation signed by Mark Anthony Murray DO at 5/23/2023  2:47 PM (Updated): I have personally seen and examined  I discussed the patient with the  fellow including, but not limited to, verifying findings; reviewing labs and x-rays;developing the plan of care with patient  I have reviewed the note and assessment performed by the fellow and agree with the fellow's documented findings and plan of care with the following additions  Please see my following comments for details and adjustments  Assessment/Plan  67 y o   M with PMHx of diabetes, HRN, GERD, asthma, HLD, and MOHIT on CPAP who presents today for follow-up of MOHIT  1  Mild MOHIT (AHI - 9 5) on autoPAP 8-12  Previous data showed compliance  Today data is not available  Patient continues to note significant daytime sleepiness  -  We will adjust his pressure up for better clinical response  Will increase to 9-15  Follow compliance in 3 months      -  We will have him bring in his machine to [facility] to ensure that the data shows adequate compliance  -  We did discuss a titration study to ensure there is no other reasons why he is sleepy  He is hesitant to get that done  -  He also will be going to [facility] to obtain a new modem as well  -  I also discussed in depth the risk of leaving sleep apnea untreated including hypertension, heart failure, arrhythmia, MI and stroke  Mr Pepper Roland returns to the office today to discuss his MOHIT  He states that he has been using his machine every day  He did get a new machine through [facility] but it does not seem to be communicating  He is noting significant daytime sleepiness despite CPAP     Vitals:    05/23/23 1324   BP: 138/72   Pulse: 56   SpO2: 97%   RA  General:  Patient is awake, alert, non-toxic and in no acute respiratory distress  Eyes: PERRL, no scleral icterus  Neck: No JVD  CV:  Regular, +S1 and S2, No murmurs, gallops or rubs appreciated  Lungs: Clear to auscultation bilateral without wheeze, rales or rhonci  Abdomen: Soft, +BS, Non-tender, non-distended  Extremities: No clubbing, cyanosis or edema  Neuro: No focal motor/sensory deficits  Skin: Warm, No rashes or ulcerations  Lab Results   Component Value Date    WBC 5 20 05/10/2023    HGB 14 1 05/10/2023    HCT 40 6 05/10/2023    MCV 96 05/10/2023     05/10/2023     Lab Results   Component Value Date    SODIUM 138 05/10/2023    K 4 1 05/10/2023     (H) 05/10/2023    CO2 25 05/10/2023    BUN 16 05/10/2023    CREATININE 1 13 05/10/2023    GLUC 169 (H) 02/05/2023    CALCIUM 9 8 05/10/2023     Sleep studies:  Diagnostic: 3/13/2019, AHI 9 5    Compliance Data:  5/27/22 - 6/26/22                                  Type of CPAP:  autoPAP 8-12                                   Percent usage: 100%                                   Average time used: 8 hrs 29 minutes                                  Time in large leak: 10 secs                                   Residual AHI: 2 4

## 2023-05-23 NOTE — PATIENT INSTRUCTIONS
Please go to [email or web address] to get a new modem     It is located at   [employer], [place]

## 2023-05-24 ENCOUNTER — TELEPHONE (OUTPATIENT)
Dept: SLEEP CENTER | Facility: CLINIC | Age: 73
End: 2023-05-24

## 2023-05-24 LAB
DME PARACHUTE DELIVERY DATE REQUESTED: NORMAL
DME PARACHUTE ITEM DESCRIPTION: NORMAL
DME PARACHUTE ORDER STATUS: NORMAL
DME PARACHUTE SUPPLIER NAME: NORMAL
DME PARACHUTE SUPPLIER PHONE: NORMAL

## 2023-07-25 ENCOUNTER — OFFICE VISIT (OUTPATIENT)
Dept: UROLOGY | Facility: AMBULATORY SURGERY CENTER | Age: 73
End: 2023-07-25
Payer: MEDICARE

## 2023-07-25 VITALS
DIASTOLIC BLOOD PRESSURE: 78 MMHG | HEIGHT: 65 IN | HEART RATE: 53 BPM | WEIGHT: 180 LBS | SYSTOLIC BLOOD PRESSURE: 120 MMHG | OXYGEN SATURATION: 99 % | BODY MASS INDEX: 29.99 KG/M2

## 2023-07-25 DIAGNOSIS — N35.919 STRICTURE OF MALE URETHRA, UNSPECIFIED STRICTURE TYPE: ICD-10-CM

## 2023-07-25 DIAGNOSIS — N40.1 BENIGN PROSTATIC HYPERPLASIA WITH WEAK URINARY STREAM: Primary | ICD-10-CM

## 2023-07-25 DIAGNOSIS — R39.12 BENIGN PROSTATIC HYPERPLASIA WITH WEAK URINARY STREAM: Primary | ICD-10-CM

## 2023-07-25 LAB — POST-VOID RESIDUAL VOLUME, ML POC: 67 ML

## 2023-07-25 PROCEDURE — 51798 US URINE CAPACITY MEASURE: CPT | Performed by: NURSE PRACTITIONER

## 2023-07-25 PROCEDURE — 99213 OFFICE O/P EST LOW 20 MIN: CPT | Performed by: NURSE PRACTITIONER

## 2023-07-25 NOTE — PROGRESS NOTES
7/25/2023    Krya Arcos  1950  [id number]      Assessment  -BPH with lower urinary tract symptoms  -Urethral stricture  -Recurrent urinary tract infections     Discussion/Plan  Alejandro Obrien is a 67 y.o. male being managed by Dr. Deyanira Dillard    1. BPH with lower urinary tract symptoms, urethra stricture-PVR in the office today 67 mL. He has no urinary complaints at this time. Would advise patient to continue performing intermittent CIC to minimize risk of urethral stricture development. He verbalizes understanding. Patient will continue to catheterize once weekly. We will continue to treat urinary tract infections as needed. Reviewed symptoms of acute infection. Follow-up in 6 months to reevaluate his urinary symptoms and perform PVR assessment. He was advised to call sooner with any questions or issues.    -All questions answered, patient agrees with plan      History of Present Illness  67 y.o. male with a history of BPH, urethral stricture, recurrent UTI presents today for follow up. Patient last seen in the office in March 2023. He underwent cystoscopy which noted pendulous urethra and urethral strictures which were easily traversed with cystoscope. Patient was not found to have obstructing prostate, prior defect from greenlight laser noted. He had been advised to perform intermittent CIC for management of urethral strictures. Patient continues to perform CIC once weekly. He denies any difficulties passing catheter or gross hematuria. Patient does note a sensation of "vibrations" when he catheterizes. His last urinary tract infection was treated in March 2023. Patient has discontinued tamsulosin. No additional changes to his overall health. Review of Systems  Review of Systems   Constitutional: Negative. HENT: Negative. Respiratory: Negative. Cardiovascular: Negative. Gastrointestinal: Negative.     Genitourinary: Negative for decreased urine volume, difficulty urinating, dysuria, flank pain, frequency, hematuria and urgency. Musculoskeletal: Negative. Skin: Negative. Neurological: Negative. Psychiatric/Behavioral: Negative.         Past Medical History  Past Medical History:   Diagnosis Date   • Asthma    • CPAP (continuous positive airway pressure) dependence    • Diabetes mellitus (HCC)    • GERD (gastroesophageal reflux disease)    • Hyperlipidemia    • Hypertension    • Kidney stone    • Myocardial infarction ([address])    • Pneumonia    • Sleep apnea        Past Social History  Past Surgical History:   Procedure Laterality Date   • APPENDECTOMY     • CHOLECYSTECTOMY     • COLONOSCOPY     • CORONARY ANGIOPLASTY WITH STENT PLACEMENT  2016    2 stent   • NE LASER VAPORIZATION OF PROSTATE FOR URINE FLOW N/A 12/12/2022    Procedure: TRANSURETHRAL RESECTION OF PROSTATE W/ GREENLIGHT LASER;  Surgeon: Alberto Reyna MD;  Location: [address] [place] [address];  Service: Urology   • TRANSURETHRAL RESECTION OF PROSTATE         Past Family History  Family History   Problem Relation Age of Onset   • Lymphoma Father    • Heart defect Mother    • Diabetes Mother    • Asthma Paternal Grandmother    • Heart disease Maternal Grandfather    • Seizures Brother         Epilepsy   • Uterine cancer Sister    • Asthma Child        Past Social history  Social History     Socioeconomic History   • Marital status: /Civil Union     Spouse name: Not on file   • Number of children: Not on file   • Years of education: Not on file   • Highest education level: Not on file   Occupational History   • Not on file   Tobacco Use   • Smoking status: Never   • Smokeless tobacco: Never   Vaping Use   • Vaping Use: Never used   Substance and Sexual Activity   • Alcohol use: Yes     Comment: occasional once or  twice a mothn a beer   • Drug use: Never   • Sexual activity: Not Currently   Other Topics Concern   • Not on file   Social History Narrative   • Not on file     Social Determinants of Health     Financial Resource Strain: Not on file   Food Insecurity: Not on file   Transportation Needs: Not on file   Physical Activity: Not on file   Stress: Not on file   Social Connections: Not on file   Intimate Partner Violence: Not on file   Housing Stability: Not on file       Current Medications  Current Outpatient Medications   Medication Sig Dispense Refill   • amLODIPine (NORVASC) 2.5 mg tablet take 1 tablet by mouth twice a day     • ASPIRIN 81 PO Take by mouth daily after breakfast     • Cholecalciferol (VITAMIN D3 PO) Take by mouth daily after breakfast     • Choline Fenofibrate (FENOFIBRIC ACID) 135 MG CPDR Take by mouth daily after breakfast     • dexlansoprazole (DEXILANT) 60 MG capsule Take 60 mg by mouth daily     • glimepiride (AMARYL) 2 mg tablet Take 2 mg by mouth daily with breakfast     • lisinopril (ZESTRIL) 10 mg tablet Take 10 mg by mouth daily     • Menaquinone-7 (VITAMIN K2 PO) Take by mouth daily after breakfast     • metoprolol tartrate (LOPRESSOR) 50 mg tablet Take 25 mg by mouth every 12 (twelve) hours     • multivitamin (THERAGRAN) TABS Take 1 tablet by mouth in the morning. • rosuvastatin (CRESTOR) 20 MG tablet Take 20 mg by mouth in the morning. • Semaglutide 7 MG TABS Take by mouth daily after breakfast     • SITagliptin-metFORMIN HCl -1000 MG TB24 Take 1 tablet by mouth daily after breakfast     • Accu-Chek Emily Plus test strip  (Patient not taking: Reported on 7/25/2023)     • tamsulosin (FLOMAX) 0.4 mg Take 1 capsule (0.4 mg total) by mouth daily with dinner (Patient not taking: Reported on 5/23/2023) 30 capsule 3     No current facility-administered medications for this visit. Allergies  No Known Allergies    Past Medical History, Social History, Family History, medications and allergies were reviewed. Vitals  Vitals:    07/25/23 1454   Weight: 81.6 kg (180 lb)   Height: 5' 5" (1.651 m)       Physical Exam  Physical Exam  Constitutional:       Appearance: Normal appearance.  He is well-developed. HENT:      Head: Normocephalic. Eyes:      Pupils: Pupils are equal, round, and reactive to light. Pulmonary:      Effort: Pulmonary effort is normal.   Abdominal:      Palpations: Abdomen is soft. Musculoskeletal:         General: Normal range of motion. Cervical back: Normal range of motion. Skin:     General: Skin is warm and dry. Neurological:      General: No focal deficit present. Mental Status: He is alert and oriented to person, place, and time. Psychiatric:         Mood and Affect: Mood normal.         Behavior: Behavior normal.         Thought Content: Thought content normal.         Judgment: Judgment normal.         Results    I have personally reviewed all pertinent lab results and reviewed with patient  Lab Results   Component Value Date    PSA 0.3 06/08/2022    PSA 0.6 05/05/2022    PSA 0.6 03/24/2022     Lab Results   Component Value Date    CALCIUM 9.8 05/10/2023    K 4.1 05/10/2023    CO2 25 05/10/2023     (H) 05/10/2023    BUN 16 05/10/2023    CREATININE 1.13 05/10/2023     Lab Results   Component Value Date    WBC 5.20 05/10/2023    HGB 14.1 05/10/2023    HCT 40.6 05/10/2023    MCV 96 05/10/2023     05/10/2023     No results found for this or any previous visit (from the past 1 hour(s)).

## 2023-09-13 ENCOUNTER — APPOINTMENT (OUTPATIENT)
Dept: LAB | Age: 73
End: 2023-09-13
Payer: MEDICARE

## 2023-09-13 DIAGNOSIS — R35.0 URINARY FREQUENCY: ICD-10-CM

## 2023-09-13 DIAGNOSIS — E13.69 OTHER SPECIFIED DIABETES MELLITUS WITH OTHER SPECIFIED COMPLICATION, UNSPECIFIED WHETHER LONG TERM INSULIN USE (HCC): ICD-10-CM

## 2023-09-13 LAB
ALBUMIN SERPL BCP-MCNC: 4.2 G/DL (ref 3.5–5)
ALP SERPL-CCNC: 47 U/L (ref 34–104)
ALT SERPL W P-5'-P-CCNC: 19 U/L (ref 7–52)
ANION GAP SERPL CALCULATED.3IONS-SCNC: 8 MMOL/L
AST SERPL W P-5'-P-CCNC: 24 U/L (ref 13–39)
BASOPHILS # BLD AUTO: 0.03 THOUSANDS/ÂΜL (ref 0–0.1)
BASOPHILS NFR BLD AUTO: 1 % (ref 0–1)
BILIRUB SERPL-MCNC: 0.61 MG/DL (ref 0.2–1)
BUN SERPL-MCNC: 21 MG/DL (ref 5–25)
CALCIUM SERPL-MCNC: 9.5 MG/DL (ref 8.4–10.2)
CHLORIDE SERPL-SCNC: 107 MMOL/L (ref 96–108)
CO2 SERPL-SCNC: 27 MMOL/L (ref 21–32)
CREAT SERPL-MCNC: 1.27 MG/DL (ref 0.6–1.3)
EOSINOPHIL # BLD AUTO: 0.2 THOUSAND/ÂΜL (ref 0–0.61)
EOSINOPHIL NFR BLD AUTO: 3 % (ref 0–6)
ERYTHROCYTE [DISTWIDTH] IN BLOOD BY AUTOMATED COUNT: 12.6 % (ref 11.6–15.1)
EST. AVERAGE GLUCOSE BLD GHB EST-MCNC: 134 MG/DL
GFR SERPL CREATININE-BSD FRML MDRD: 55 ML/MIN/1.73SQ M
GLUCOSE P FAST SERPL-MCNC: 114 MG/DL (ref 65–99)
HBA1C MFR BLD: 6.3 %
HCT VFR BLD AUTO: 41.9 % (ref 36.5–49.3)
HGB BLD-MCNC: 14.7 G/DL (ref 12–17)
IMM GRANULOCYTES # BLD AUTO: 0.06 THOUSAND/UL (ref 0–0.2)
IMM GRANULOCYTES NFR BLD AUTO: 1 % (ref 0–2)
LYMPHOCYTES # BLD AUTO: 1.77 THOUSANDS/ÂΜL (ref 0.6–4.47)
LYMPHOCYTES NFR BLD AUTO: 29 % (ref 14–44)
MCH RBC QN AUTO: 34.3 PG (ref 26.8–34.3)
MCHC RBC AUTO-ENTMCNC: 35.1 G/DL (ref 31.4–37.4)
MCV RBC AUTO: 98 FL (ref 82–98)
MONOCYTES # BLD AUTO: 0.58 THOUSAND/ÂΜL (ref 0.17–1.22)
MONOCYTES NFR BLD AUTO: 10 % (ref 4–12)
NEUTROPHILS # BLD AUTO: 3.46 THOUSANDS/ÂΜL (ref 1.85–7.62)
NEUTS SEG NFR BLD AUTO: 56 % (ref 43–75)
NRBC BLD AUTO-RTO: 0 /100 WBCS
PLATELET # BLD AUTO: 164 THOUSANDS/UL (ref 149–390)
PMV BLD AUTO: 11.4 FL (ref 8.9–12.7)
POTASSIUM SERPL-SCNC: 4.2 MMOL/L (ref 3.5–5.3)
PROT SERPL-MCNC: 6.7 G/DL (ref 6.4–8.4)
PSA SERPL-MCNC: 0.25 NG/ML (ref 0–4)
RBC # BLD AUTO: 4.29 MILLION/UL (ref 3.88–5.62)
SODIUM SERPL-SCNC: 142 MMOL/L (ref 135–147)
WBC # BLD AUTO: 6.1 THOUSAND/UL (ref 4.31–10.16)

## 2023-09-13 PROCEDURE — 84153 ASSAY OF PSA TOTAL: CPT

## 2023-09-13 PROCEDURE — 80053 COMPREHEN METABOLIC PANEL: CPT

## 2023-09-13 PROCEDURE — 83036 HEMOGLOBIN GLYCOSYLATED A1C: CPT

## 2023-09-13 PROCEDURE — 36415 COLL VENOUS BLD VENIPUNCTURE: CPT

## 2023-09-13 PROCEDURE — 85025 COMPLETE CBC W/AUTO DIFF WBC: CPT

## 2024-01-17 ENCOUNTER — APPOINTMENT (OUTPATIENT)
Dept: LAB | Age: 74
End: 2024-01-17
Payer: MEDICARE

## 2024-01-17 DIAGNOSIS — R53.83 FATIGUE, UNSPECIFIED TYPE: ICD-10-CM

## 2024-01-17 DIAGNOSIS — E78.5 HYPERLIPIDEMIA, UNSPECIFIED HYPERLIPIDEMIA TYPE: ICD-10-CM

## 2024-01-17 DIAGNOSIS — E11.9 TYPE 2 DIABETES MELLITUS WITHOUT COMPLICATION, WITHOUT LONG-TERM CURRENT USE OF INSULIN (HCC): ICD-10-CM

## 2024-01-17 LAB
ALBUMIN SERPL BCP-MCNC: 4.3 G/DL (ref 3.5–5)
ALP SERPL-CCNC: 51 U/L (ref 34–104)
ALT SERPL W P-5'-P-CCNC: 26 U/L (ref 7–52)
ANION GAP SERPL CALCULATED.3IONS-SCNC: 6 MMOL/L
AST SERPL W P-5'-P-CCNC: 29 U/L (ref 13–39)
BASOPHILS # BLD AUTO: 0.03 THOUSANDS/ÂΜL (ref 0–0.1)
BASOPHILS NFR BLD AUTO: 1 % (ref 0–1)
BILIRUB SERPL-MCNC: 0.82 MG/DL (ref 0.2–1)
BUN SERPL-MCNC: 15 MG/DL (ref 5–25)
CALCIUM SERPL-MCNC: 9.7 MG/DL (ref 8.4–10.2)
CHLORIDE SERPL-SCNC: 106 MMOL/L (ref 96–108)
CHOLEST SERPL-MCNC: 99 MG/DL
CO2 SERPL-SCNC: 29 MMOL/L (ref 21–32)
CREAT SERPL-MCNC: 1.11 MG/DL (ref 0.6–1.3)
EOSINOPHIL # BLD AUTO: 0.14 THOUSAND/ÂΜL (ref 0–0.61)
EOSINOPHIL NFR BLD AUTO: 3 % (ref 0–6)
ERYTHROCYTE [DISTWIDTH] IN BLOOD BY AUTOMATED COUNT: 13.2 % (ref 11.6–15.1)
EST. AVERAGE GLUCOSE BLD GHB EST-MCNC: 140 MG/DL
GFR SERPL CREATININE-BSD FRML MDRD: 65 ML/MIN/1.73SQ M
GLUCOSE P FAST SERPL-MCNC: 138 MG/DL (ref 65–99)
HBA1C MFR BLD: 6.5 %
HCT VFR BLD AUTO: 46.3 % (ref 36.5–49.3)
HDLC SERPL-MCNC: 29 MG/DL
HGB BLD-MCNC: 15.3 G/DL (ref 12–17)
IMM GRANULOCYTES # BLD AUTO: 0.06 THOUSAND/UL (ref 0–0.2)
IMM GRANULOCYTES NFR BLD AUTO: 1 % (ref 0–2)
LDLC SERPL CALC-MCNC: 33 MG/DL (ref 0–100)
LYMPHOCYTES # BLD AUTO: 1.55 THOUSANDS/ÂΜL (ref 0.6–4.47)
LYMPHOCYTES NFR BLD AUTO: 29 % (ref 14–44)
MCH RBC QN AUTO: 33.4 PG (ref 26.8–34.3)
MCHC RBC AUTO-ENTMCNC: 33 G/DL (ref 31.4–37.4)
MCV RBC AUTO: 101 FL (ref 82–98)
MONOCYTES # BLD AUTO: 0.5 THOUSAND/ÂΜL (ref 0.17–1.22)
MONOCYTES NFR BLD AUTO: 9 % (ref 4–12)
NEUTROPHILS # BLD AUTO: 3.16 THOUSANDS/ÂΜL (ref 1.85–7.62)
NEUTS SEG NFR BLD AUTO: 57 % (ref 43–75)
NONHDLC SERPL-MCNC: 70 MG/DL
NRBC BLD AUTO-RTO: 0 /100 WBCS
PLATELET # BLD AUTO: 157 THOUSANDS/UL (ref 149–390)
PMV BLD AUTO: 11.8 FL (ref 8.9–12.7)
POTASSIUM SERPL-SCNC: 4 MMOL/L (ref 3.5–5.3)
PROT SERPL-MCNC: 6.9 G/DL (ref 6.4–8.4)
RBC # BLD AUTO: 4.58 MILLION/UL (ref 3.88–5.62)
SODIUM SERPL-SCNC: 141 MMOL/L (ref 135–147)
T3 SERPL-MCNC: 0.8 NG/ML
T4 FREE SERPL-MCNC: 0.79 NG/DL (ref 0.61–1.12)
TRIGL SERPL-MCNC: 183 MG/DL
TSH SERPL DL<=0.05 MIU/L-ACNC: 5.84 UIU/ML (ref 0.45–4.5)
WBC # BLD AUTO: 5.44 THOUSAND/UL (ref 4.31–10.16)

## 2024-01-17 PROCEDURE — 36415 COLL VENOUS BLD VENIPUNCTURE: CPT

## 2024-01-17 PROCEDURE — 84443 ASSAY THYROID STIM HORMONE: CPT

## 2024-01-17 PROCEDURE — 80053 COMPREHEN METABOLIC PANEL: CPT

## 2024-01-17 PROCEDURE — 84480 ASSAY TRIIODOTHYRONINE (T3): CPT

## 2024-01-17 PROCEDURE — 80061 LIPID PANEL: CPT

## 2024-01-17 PROCEDURE — 84439 ASSAY OF FREE THYROXINE: CPT

## 2024-01-17 PROCEDURE — 83036 HEMOGLOBIN GLYCOSYLATED A1C: CPT

## 2024-01-17 PROCEDURE — 85025 COMPLETE CBC W/AUTO DIFF WBC: CPT

## 2024-02-21 PROBLEM — N39.0 RECURRENT UTI: Status: RESOLVED | Noted: 2023-03-30 | Resolved: 2024-02-21

## 2024-05-17 ENCOUNTER — APPOINTMENT (OUTPATIENT)
Dept: LAB | Age: 74
End: 2024-05-17
Payer: MEDICARE

## 2024-05-17 DIAGNOSIS — R53.83 OTHER FATIGUE: ICD-10-CM

## 2024-05-17 DIAGNOSIS — E11.9 DIABETES MELLITUS WITHOUT COMPLICATION (HCC): ICD-10-CM

## 2024-05-17 DIAGNOSIS — E78.5 HYPERLIPIDEMIA, UNSPECIFIED HYPERLIPIDEMIA TYPE: ICD-10-CM

## 2024-05-17 LAB
ALBUMIN SERPL BCP-MCNC: 4.2 G/DL (ref 3.5–5)
ALP SERPL-CCNC: 56 U/L (ref 34–104)
ALT SERPL W P-5'-P-CCNC: 9 U/L (ref 7–52)
ANION GAP SERPL CALCULATED.3IONS-SCNC: 8 MMOL/L (ref 4–13)
AST SERPL W P-5'-P-CCNC: 24 U/L (ref 13–39)
BASOPHILS # BLD AUTO: 0.03 THOUSANDS/ÂΜL (ref 0–0.1)
BASOPHILS NFR BLD AUTO: 1 % (ref 0–1)
BILIRUB SERPL-MCNC: 0.61 MG/DL (ref 0.2–1)
BUN SERPL-MCNC: 17 MG/DL (ref 5–25)
CALCIUM SERPL-MCNC: 9.1 MG/DL (ref 8.4–10.2)
CHLORIDE SERPL-SCNC: 108 MMOL/L (ref 96–108)
CO2 SERPL-SCNC: 26 MMOL/L (ref 21–32)
CREAT SERPL-MCNC: 1.11 MG/DL (ref 0.6–1.3)
CREAT UR-MCNC: 202 MG/DL
EOSINOPHIL # BLD AUTO: 0.15 THOUSAND/ÂΜL (ref 0–0.61)
EOSINOPHIL NFR BLD AUTO: 3 % (ref 0–6)
ERYTHROCYTE [DISTWIDTH] IN BLOOD BY AUTOMATED COUNT: 12.6 % (ref 11.6–15.1)
EST. AVERAGE GLUCOSE BLD GHB EST-MCNC: 131 MG/DL
GFR SERPL CREATININE-BSD FRML MDRD: 65 ML/MIN/1.73SQ M
GLUCOSE P FAST SERPL-MCNC: 127 MG/DL (ref 65–99)
HBA1C MFR BLD: 6.2 %
HCT VFR BLD AUTO: 41.7 % (ref 36.5–49.3)
HGB BLD-MCNC: 14.4 G/DL (ref 12–17)
IMM GRANULOCYTES # BLD AUTO: 0.06 THOUSAND/UL (ref 0–0.2)
IMM GRANULOCYTES NFR BLD AUTO: 1 % (ref 0–2)
LYMPHOCYTES # BLD AUTO: 1.64 THOUSANDS/ÂΜL (ref 0.6–4.47)
LYMPHOCYTES NFR BLD AUTO: 28 % (ref 14–44)
MCH RBC QN AUTO: 33.9 PG (ref 26.8–34.3)
MCHC RBC AUTO-ENTMCNC: 34.5 G/DL (ref 31.4–37.4)
MCV RBC AUTO: 98 FL (ref 82–98)
MICROALBUMIN UR-MCNC: 32.7 MG/L
MICROALBUMIN/CREAT 24H UR: 16 MG/G CREATININE (ref 0–30)
MONOCYTES # BLD AUTO: 0.48 THOUSAND/ÂΜL (ref 0.17–1.22)
MONOCYTES NFR BLD AUTO: 8 % (ref 4–12)
NEUTROPHILS # BLD AUTO: 3.58 THOUSANDS/ÂΜL (ref 1.85–7.62)
NEUTS SEG NFR BLD AUTO: 59 % (ref 43–75)
NRBC BLD AUTO-RTO: 0 /100 WBCS
PLATELET # BLD AUTO: 155 THOUSANDS/UL (ref 149–390)
PMV BLD AUTO: 11.6 FL (ref 8.9–12.7)
POTASSIUM SERPL-SCNC: 4 MMOL/L (ref 3.5–5.3)
PROT SERPL-MCNC: 6.7 G/DL (ref 6.4–8.4)
RBC # BLD AUTO: 4.25 MILLION/UL (ref 3.88–5.62)
SODIUM SERPL-SCNC: 142 MMOL/L (ref 135–147)
T3 SERPL-MCNC: 0.9 NG/ML
T4 FREE SERPL-MCNC: 0.7 NG/DL (ref 0.61–1.12)
TSH SERPL DL<=0.05 MIU/L-ACNC: 2.55 UIU/ML (ref 0.45–4.5)
WBC # BLD AUTO: 5.94 THOUSAND/UL (ref 4.31–10.16)

## 2024-05-17 PROCEDURE — 82043 UR ALBUMIN QUANTITATIVE: CPT

## 2024-05-17 PROCEDURE — 85025 COMPLETE CBC W/AUTO DIFF WBC: CPT

## 2024-05-17 PROCEDURE — 80053 COMPREHEN METABOLIC PANEL: CPT

## 2024-05-17 PROCEDURE — 84439 ASSAY OF FREE THYROXINE: CPT

## 2024-05-17 PROCEDURE — 84480 ASSAY TRIIODOTHYRONINE (T3): CPT

## 2024-05-17 PROCEDURE — 83036 HEMOGLOBIN GLYCOSYLATED A1C: CPT

## 2024-05-17 PROCEDURE — 82570 ASSAY OF URINE CREATININE: CPT

## 2024-05-17 PROCEDURE — 84443 ASSAY THYROID STIM HORMONE: CPT

## 2024-05-17 PROCEDURE — 36415 COLL VENOUS BLD VENIPUNCTURE: CPT

## 2024-07-09 ENCOUNTER — OFFICE VISIT (OUTPATIENT)
Dept: UROLOGY | Facility: AMBULATORY SURGERY CENTER | Age: 74
End: 2024-07-09
Payer: MEDICARE

## 2024-07-09 VITALS
DIASTOLIC BLOOD PRESSURE: 74 MMHG | HEIGHT: 65 IN | HEART RATE: 53 BPM | OXYGEN SATURATION: 96 % | SYSTOLIC BLOOD PRESSURE: 144 MMHG | WEIGHT: 179 LBS | BODY MASS INDEX: 29.82 KG/M2 | RESPIRATION RATE: 18 BRPM

## 2024-07-09 DIAGNOSIS — N40.1 BPH WITH OBSTRUCTION/LOWER URINARY TRACT SYMPTOMS: Primary | ICD-10-CM

## 2024-07-09 DIAGNOSIS — N13.8 BPH WITH OBSTRUCTION/LOWER URINARY TRACT SYMPTOMS: Primary | ICD-10-CM

## 2024-07-09 LAB — POST-VOID RESIDUAL VOLUME, ML POC: 102 ML

## 2024-07-09 PROCEDURE — 99213 OFFICE O/P EST LOW 20 MIN: CPT

## 2024-07-09 PROCEDURE — 51798 US URINE CAPACITY MEASURE: CPT

## 2024-07-09 RX ORDER — LEVODOPA AND CARBIDOPA 95; 23.75 MG/1; MG/1
2 CAPSULE, EXTENDED RELEASE ORAL 3 TIMES DAILY
COMMUNITY

## 2024-07-09 RX ORDER — LEVOTHYROXINE SODIUM 0.03 MG/1
25 TABLET ORAL DAILY
COMMUNITY

## 2024-07-09 NOTE — PROGRESS NOTES
Office Visit- Urology  Moris Doshi 1950 MRN: [id number]      Assessment/Discussion/Plan    73 y.o. male managed by     BPH with urinary tract symptoms/urethral stricture  -PVR today is 102 mL   patient continues with CIC once a week  -Overall happy with his urinary pattern   -Follow-up in 1 year    2.  Prostate cancer screening  -Last PSA returned at 0.25 in September 2023.  Obtained by PCP.    -DAX today deferred by patient  -Per patient preference he can continue with routine prostate cancer screening.  Did review AUA guidelines    Chief Complaint:   Moris is a 73 y.o. male presenting to the office for a follow up visit regarding BPH with lower tract symptoms/urethral stricture/prostate cancer screening urology            Subjective    Patient is a 73-year-old male with history of BPH, urethral stricture, recurrent UTI presents for follow-up he was last in the office in July 2023.  He previously underwent cystoscopy with noted pendulous urethra and urethral stricture which was easily transversed with a cystoscope.  Patient was not found to have obstructing prostate.  Previous history of greenlight laser procedure.  He has been maintaining CIC once a week for management of urethral strictures he denies any difficulty with passing the catheter gross hematuria.  No culture proven UTIs since March 2023.  Last PSA was 0.25 in September 2023.        AUA SYMPTOM SCORE      Flowsheet Row Most Recent Value   AUA SYMPTOM SCORE    How often have you had a sensation of not emptying your bladder completely after you finished urinating? 0 (P)     How often have you had to urinate again less than two hours after you finished urinating? 0 (P)     How often have you found you stopped and started again several times when you urinate? 0 (P)     How often have you found it difficult to postpone urination? 0 (P)     How often have you had a weak urinary stream? 0 (P)     How often have you had to push or strain to begin  urination? 0 (P)     How many times did you most typically get up to urinate from the time you went to bed at night until the time you got up in the morning? 1 (P)     Quality of Life: If you were to spend the rest of your life with your urinary condition just the way it is now, how would you feel about that? 0 (P)     AUA SYMPTOM SCORE 1 (P)              ROS:   Review of Systems   Constitutional: Negative.  Negative for chills, fatigue and fever.   HENT: Negative.     Respiratory:  Negative for shortness of breath.    Cardiovascular:  Negative for chest pain.   Gastrointestinal: Negative.  Negative for abdominal pain.   Endocrine: Negative.    Musculoskeletal: Negative.    Skin: Negative.    Neurological: Negative.  Negative for dizziness and light-headedness.   Hematological: Negative.    Psychiatric/Behavioral: Negative.           Past Medical History  Past Medical History:   Diagnosis Date    Asthma     CPAP (continuous positive airway pressure) dependence     Diabetes mellitus (HCC)     GERD (gastroesophageal reflux disease)     Hyperlipidemia     Hypertension     Kidney stone     Myocardial infarction (HCC)     Pneumonia     Sleep apnea        Past Surgical History  Past Surgical History:   Procedure Laterality Date    APPENDECTOMY      CHOLECYSTECTOMY      COLONOSCOPY      CORONARY ANGIOPLASTY WITH STENT PLACEMENT  2016    2 stent    CT LASER VAPORIZATION OF PROSTATE FOR URINE FLOW N/A 12/12/2022    Procedure: TRANSURETHRAL RESECTION OF PROSTATE W/ GREENLIGHT LASER;  Surgeon: Steve Asher MD;  Location:  MAIN OR;  Service: Urology    TRANSURETHRAL RESECTION OF PROSTATE         Past Family History  Family History   Problem Relation Age of Onset    Lymphoma Father     Heart defect Mother     Diabetes Mother     Asthma Paternal Grandmother     Heart disease Maternal Grandfather     Seizures Brother         Epilepsy    Uterine cancer Sister     Asthma Child        Past Social history  Social History      Socioeconomic History    Marital status: /Civil Union     Spouse name: Not on file    Number of children: Not on file    Years of education: Not on file    Highest education level: Not on file   Occupational History    Not on file   Tobacco Use    Smoking status: Never     Passive exposure: Never    Smokeless tobacco: Never   Vaping Use    Vaping status: Never Used   Substance and Sexual Activity    Alcohol use: Yes     Comment: occasional once or  twice a mothn a beer    Drug use: Never    Sexual activity: Not Currently   Other Topics Concern    Not on file   Social History Narrative    Not on file     Social Determinants of Health     Financial Resource Strain: Not on file   Food Insecurity: Not on file   Transportation Needs: Not on file   Physical Activity: Not on file   Stress: Not on file   Social Connections: Not on file   Intimate Partner Violence: Not on file   Housing Stability: Not on file       Current Medications  Current Outpatient Medications   Medication Sig Dispense Refill    carbidopa-levodopa (Sinemet)  mg per tablet       Accu-Chek Emily Plus test strip  (Patient not taking: Reported on 7/25/2023)      amLODIPine (NORVASC) 2.5 mg tablet take 1 tablet by mouth twice a day      ASPIRIN 81 PO Take by mouth daily after breakfast      Cholecalciferol (VITAMIN D3 PO) Take by mouth daily after breakfast      Choline Fenofibrate (FENOFIBRIC ACID) 135 MG CPDR Take by mouth daily after breakfast      dexlansoprazole (DEXILANT) 60 MG capsule Take 60 mg by mouth daily      glimepiride (AMARYL) 2 mg tablet Take 2 mg by mouth daily with breakfast      levothyroxine 25 mcg tablet Take 25 mcg by mouth daily      lisinopril (ZESTRIL) 10 mg tablet Take 10 mg by mouth daily      Menaquinone-7 (VITAMIN K2 PO) Take by mouth daily after breakfast      metoprolol tartrate (LOPRESSOR) 50 mg tablet Take 25 mg by mouth every 12 (twelve) hours      multivitamin (THERAGRAN) TABS Take 1 tablet by mouth in  "the morning.      rosuvastatin (CRESTOR) 20 MG tablet Take 20 mg by mouth in the morning.      Rytary 23.75-95 MG CPCR Take 2 capsules by mouth 3 (three) times a day      Semaglutide 7 MG TABS Take by mouth daily after breakfast      SITagliptin-metFORMIN HCl -1000 MG TB24 Take 1 tablet by mouth daily after breakfast       No current facility-administered medications for this visit.       Allergies  No Known Allergies    OBJECTIVE    Vitals   Vitals:    07/09/24 1105   BP: 144/74   BP Location: Left arm   Patient Position: Sitting   Cuff Size: Standard   Pulse: (!) 53   Resp: 18   SpO2: 96%   Weight: 81.2 kg (179 lb)   Height: 5' 5\" (1.651 m)       PVR:    Physical Exam  Constitutional:       General: He is not in acute distress.     Appearance: Normal appearance. He is normal weight. He is not ill-appearing or toxic-appearing.   HENT:      Head: Normocephalic and atraumatic.   Eyes:      Conjunctiva/sclera: Conjunctivae normal.   Cardiovascular:      Rate and Rhythm: Normal rate.   Pulmonary:      Effort: Pulmonary effort is normal. No respiratory distress.   Skin:     General: Skin is warm and dry.   Neurological:      General: No focal deficit present.      Mental Status: He is alert and oriented to person, place, and time.      Cranial Nerves: No cranial nerve deficit.   Psychiatric:         Mood and Affect: Mood normal.         Behavior: Behavior normal.         Thought Content: Thought content normal.          Labs:     Lab Results   Component Value Date    PSA 0.25 09/13/2023    PSA 0.3 06/08/2022    PSA 0.6 05/05/2022    PSA 0.6 03/24/2022     Lab Results   Component Value Date    CREATININE 1.11 05/17/2024      Lab Results   Component Value Date    HGBA1C 6.2 (H) 05/17/2024     Lab Results   Component Value Date    CALCIUM 9.1 05/17/2024    K 4.0 05/17/2024    CO2 26 05/17/2024     05/17/2024    BUN 17 05/17/2024    CREATININE 1.11 05/17/2024       I have personally reviewed all pertinent lab " results and reviewed with patient    Imaging       Contreras Mendoza PA-C  Date: 7/9/2024 Time: 11:09 AM  [facility]    This note was written using fluency dictation software. Please excuse any resulting minor grammatical errors.

## 2024-09-19 ENCOUNTER — APPOINTMENT (OUTPATIENT)
Dept: LAB | Age: 74
End: 2024-09-19
Payer: MEDICARE

## 2024-09-19 DIAGNOSIS — R53.81 DEBILITY: ICD-10-CM

## 2024-09-19 DIAGNOSIS — E78.5 HYPERLIPIDEMIA, UNSPECIFIED HYPERLIPIDEMIA TYPE: ICD-10-CM

## 2024-09-19 DIAGNOSIS — E11.9 TYPE 2 DIABETES MELLITUS WITHOUT COMPLICATION, WITHOUT LONG-TERM CURRENT USE OF INSULIN (HCC): ICD-10-CM

## 2024-09-19 LAB
ALBUMIN SERPL BCG-MCNC: 4.3 G/DL (ref 3.5–5)
ALP SERPL-CCNC: 53 U/L (ref 34–104)
ALT SERPL W P-5'-P-CCNC: 9 U/L (ref 7–52)
ANION GAP SERPL CALCULATED.3IONS-SCNC: 8 MMOL/L (ref 4–13)
AST SERPL W P-5'-P-CCNC: 24 U/L (ref 13–39)
BASOPHILS # BLD AUTO: 0.05 THOUSANDS/ΜL (ref 0–0.1)
BASOPHILS NFR BLD AUTO: 1 % (ref 0–1)
BILIRUB SERPL-MCNC: 0.57 MG/DL (ref 0.2–1)
BUN SERPL-MCNC: 20 MG/DL (ref 5–25)
CALCIUM SERPL-MCNC: 10.2 MG/DL (ref 8.4–10.2)
CHLORIDE SERPL-SCNC: 105 MMOL/L (ref 96–108)
CHOLEST SERPL-MCNC: 109 MG/DL
CO2 SERPL-SCNC: 26 MMOL/L (ref 21–32)
CREAT SERPL-MCNC: 1.28 MG/DL (ref 0.6–1.3)
EOSINOPHIL # BLD AUTO: 0.29 THOUSAND/ΜL (ref 0–0.61)
EOSINOPHIL NFR BLD AUTO: 4 % (ref 0–6)
ERYTHROCYTE [DISTWIDTH] IN BLOOD BY AUTOMATED COUNT: 12.5 % (ref 11.6–15.1)
EST. AVERAGE GLUCOSE BLD GHB EST-MCNC: 128 MG/DL
GFR SERPL CREATININE-BSD FRML MDRD: 54 ML/MIN/1.73SQ M
GLUCOSE P FAST SERPL-MCNC: 142 MG/DL (ref 65–99)
HBA1C MFR BLD: 6.1 %
HCT VFR BLD AUTO: 41.6 % (ref 36.5–49.3)
HDLC SERPL-MCNC: 26 MG/DL
HGB BLD-MCNC: 14.3 G/DL (ref 12–17)
IMM GRANULOCYTES # BLD AUTO: 0.05 THOUSAND/UL (ref 0–0.2)
IMM GRANULOCYTES NFR BLD AUTO: 1 % (ref 0–2)
LDLC SERPL CALC-MCNC: 31 MG/DL (ref 0–100)
LYMPHOCYTES # BLD AUTO: 1.75 THOUSANDS/ΜL (ref 0.6–4.47)
LYMPHOCYTES NFR BLD AUTO: 27 % (ref 14–44)
MCH RBC QN AUTO: 34.2 PG (ref 26.8–34.3)
MCHC RBC AUTO-ENTMCNC: 34.4 G/DL (ref 31.4–37.4)
MCV RBC AUTO: 100 FL (ref 82–98)
MONOCYTES # BLD AUTO: 0.5 THOUSAND/ΜL (ref 0.17–1.22)
MONOCYTES NFR BLD AUTO: 8 % (ref 4–12)
NEUTROPHILS # BLD AUTO: 3.89 THOUSANDS/ΜL (ref 1.85–7.62)
NEUTS SEG NFR BLD AUTO: 59 % (ref 43–75)
NONHDLC SERPL-MCNC: 83 MG/DL
NRBC BLD AUTO-RTO: 0 /100 WBCS
PLATELET # BLD AUTO: 167 THOUSANDS/UL (ref 149–390)
PMV BLD AUTO: 11.3 FL (ref 8.9–12.7)
POTASSIUM SERPL-SCNC: 4.3 MMOL/L (ref 3.5–5.3)
PROT SERPL-MCNC: 6.8 G/DL (ref 6.4–8.4)
RBC # BLD AUTO: 4.18 MILLION/UL (ref 3.88–5.62)
SODIUM SERPL-SCNC: 139 MMOL/L (ref 135–147)
T3 SERPL-MCNC: 0.6 NG/ML
T4 FREE SERPL-MCNC: 0.92 NG/DL (ref 0.61–1.12)
TRIGL SERPL-MCNC: 259 MG/DL
TSH SERPL DL<=0.05 MIU/L-ACNC: 3.65 UIU/ML (ref 0.45–4.5)
WBC # BLD AUTO: 6.53 THOUSAND/UL (ref 4.31–10.16)

## 2024-09-19 PROCEDURE — 36415 COLL VENOUS BLD VENIPUNCTURE: CPT

## 2024-09-19 PROCEDURE — 80061 LIPID PANEL: CPT

## 2024-09-19 PROCEDURE — 84439 ASSAY OF FREE THYROXINE: CPT

## 2024-09-19 PROCEDURE — 80053 COMPREHEN METABOLIC PANEL: CPT

## 2024-09-19 PROCEDURE — 84480 ASSAY TRIIODOTHYRONINE (T3): CPT

## 2024-09-19 PROCEDURE — 83036 HEMOGLOBIN GLYCOSYLATED A1C: CPT

## 2024-09-19 PROCEDURE — 85025 COMPLETE CBC W/AUTO DIFF WBC: CPT

## 2024-09-19 PROCEDURE — 84443 ASSAY THYROID STIM HORMONE: CPT

## 2025-01-17 ENCOUNTER — APPOINTMENT (OUTPATIENT)
Dept: LAB | Age: 75
End: 2025-01-17
Payer: MEDICARE

## 2025-01-17 DIAGNOSIS — I10 ESSENTIAL HYPERTENSION, MALIGNANT: ICD-10-CM

## 2025-01-17 DIAGNOSIS — E08.00 DIABETES MELLITUS DUE TO UNDERLYING CONDITION WITH HYPEROSMOLARITY WITHOUT COMA, WITHOUT LONG-TERM CURRENT USE OF INSULIN (HCC): ICD-10-CM

## 2025-01-17 DIAGNOSIS — R53.83 OTHER FATIGUE: ICD-10-CM

## 2025-01-17 LAB
ALBUMIN SERPL BCG-MCNC: 4.3 G/DL (ref 3.5–5)
ALP SERPL-CCNC: 48 U/L (ref 34–104)
ALT SERPL W P-5'-P-CCNC: 22 U/L (ref 7–52)
ANION GAP SERPL CALCULATED.3IONS-SCNC: 5 MMOL/L (ref 4–13)
AST SERPL W P-5'-P-CCNC: 26 U/L (ref 13–39)
BASOPHILS # BLD AUTO: 0.03 THOUSANDS/ΜL (ref 0–0.1)
BASOPHILS NFR BLD AUTO: 1 % (ref 0–1)
BILIRUB SERPL-MCNC: 0.64 MG/DL (ref 0.2–1)
BUN SERPL-MCNC: 20 MG/DL (ref 5–25)
CALCIUM SERPL-MCNC: 9.6 MG/DL (ref 8.4–10.2)
CHLORIDE SERPL-SCNC: 107 MMOL/L (ref 96–108)
CO2 SERPL-SCNC: 28 MMOL/L (ref 21–32)
CREAT SERPL-MCNC: 1.22 MG/DL (ref 0.6–1.3)
EOSINOPHIL # BLD AUTO: 0.12 THOUSAND/ΜL (ref 0–0.61)
EOSINOPHIL NFR BLD AUTO: 2 % (ref 0–6)
ERYTHROCYTE [DISTWIDTH] IN BLOOD BY AUTOMATED COUNT: 12.7 % (ref 11.6–15.1)
EST. AVERAGE GLUCOSE BLD GHB EST-MCNC: 123 MG/DL
GFR SERPL CREATININE-BSD FRML MDRD: 57 ML/MIN/1.73SQ M
GLUCOSE P FAST SERPL-MCNC: 128 MG/DL (ref 65–99)
HBA1C MFR BLD: 5.9 %
HCT VFR BLD AUTO: 41 % (ref 36.5–49.3)
HGB BLD-MCNC: 14 G/DL (ref 12–17)
IMM GRANULOCYTES # BLD AUTO: 0.04 THOUSAND/UL (ref 0–0.2)
IMM GRANULOCYTES NFR BLD AUTO: 1 % (ref 0–2)
LYMPHOCYTES # BLD AUTO: 1.51 THOUSANDS/ΜL (ref 0.6–4.47)
LYMPHOCYTES NFR BLD AUTO: 30 % (ref 14–44)
MCH RBC QN AUTO: 34.1 PG (ref 26.8–34.3)
MCHC RBC AUTO-ENTMCNC: 34.1 G/DL (ref 31.4–37.4)
MCV RBC AUTO: 100 FL (ref 82–98)
MONOCYTES # BLD AUTO: 0.49 THOUSAND/ΜL (ref 0.17–1.22)
MONOCYTES NFR BLD AUTO: 10 % (ref 4–12)
NEUTROPHILS # BLD AUTO: 2.79 THOUSANDS/ΜL (ref 1.85–7.62)
NEUTS SEG NFR BLD AUTO: 56 % (ref 43–75)
NRBC BLD AUTO-RTO: 0 /100 WBCS
PLATELET # BLD AUTO: 166 THOUSANDS/UL (ref 149–390)
PMV BLD AUTO: 11.2 FL (ref 8.9–12.7)
POTASSIUM SERPL-SCNC: 4 MMOL/L (ref 3.5–5.3)
PROT SERPL-MCNC: 6.9 G/DL (ref 6.4–8.4)
RBC # BLD AUTO: 4.11 MILLION/UL (ref 3.88–5.62)
SODIUM SERPL-SCNC: 140 MMOL/L (ref 135–147)
T3 SERPL-MCNC: 0.9 NG/ML (ref 0.9–1.8)
T4 FREE SERPL-MCNC: 0.91 NG/DL (ref 0.61–1.12)
TSH SERPL DL<=0.05 MIU/L-ACNC: 2.48 UIU/ML (ref 0.45–4.5)
WBC # BLD AUTO: 4.98 THOUSAND/UL (ref 4.31–10.16)

## 2025-01-17 PROCEDURE — 85025 COMPLETE CBC W/AUTO DIFF WBC: CPT

## 2025-01-17 PROCEDURE — 84480 ASSAY TRIIODOTHYRONINE (T3): CPT

## 2025-01-17 PROCEDURE — 84443 ASSAY THYROID STIM HORMONE: CPT

## 2025-01-17 PROCEDURE — 84439 ASSAY OF FREE THYROXINE: CPT

## 2025-01-17 PROCEDURE — 83036 HEMOGLOBIN GLYCOSYLATED A1C: CPT

## 2025-01-17 PROCEDURE — 36415 COLL VENOUS BLD VENIPUNCTURE: CPT

## 2025-01-17 PROCEDURE — 80053 COMPREHEN METABOLIC PANEL: CPT

## 2025-02-14 ENCOUNTER — TELEPHONE (OUTPATIENT)
Age: 75
End: 2025-02-14

## 2025-02-14 NOTE — TELEPHONE ENCOUNTER
[employer] was calling to verify that the patient was still active with [facility], current provider with [facility] and fax number for the office.     She will be sending over forms on behalf of the patient

## 2025-04-28 RX ORDER — LOSARTAN POTASSIUM 100 %
POWDER (GRAM) MISCELLANEOUS
COMMUNITY

## 2025-04-28 RX ORDER — ESCITALOPRAM OXALATE 5 MG/1
5 TABLET ORAL DAILY
COMMUNITY

## 2025-04-28 RX ORDER — CHLORTHALIDONE 25 MG/1
25 TABLET ORAL DAILY
COMMUNITY

## 2025-04-28 NOTE — PRE-PROCEDURE INSTRUCTIONS
Pre-Surgery Instructions:   Medication Instructions    amLODIPine (NORVASC) 2.5 mg tablet Take day of surgery.    ASPIRIN 81 PO No hold may take day of surgery , or hold day of surgery     carbidopa-levodopa (Sinemet)  mg per tablet Take day of surgery.    chlorthalidone 25 mg tablet Weekly,on Wednesdays     Cholecalciferol (VITAMIN D3 PO) Hold day of surgery.    Choline Fenofibrate (FENOFIBRIC ACID) 135 MG CPDR Take day of surgery.    dexlansoprazole (DEXILANT) 60 MG capsule Take day of surgery.    escitalopram (LEXAPRO) 5 mg tablet Take day of surgery.    glimepiride (AMARYL) 2 mg tablet Hold day of surgery.    levothyroxine 25 mcg tablet Take day of surgery.    lisinopril (ZESTRIL) 10 mg tablet Hold day of surgery.    Menaquinone-7 (VITAMIN K2 PO) Hold day of surgery.    metoprolol tartrate (LOPRESSOR) 50 mg tablet Take day of surgery.    multivitamin (THERAGRAN) TABS Hold day of surgery.    rosuvastatin (CRESTOR) 20 MG tablet Take day of surgery.    Semaglutide 7 MG TABS Hold day of surgery.    SITagliptin-metFORMIN HCl -1000 MG TB24 Hold day of surgery.   Medication instructions for day of surgery reviewed. Please take all instructed medications with only a sip of water.       You will receive a call one business day prior to surgery with an arrival time and hospital directions. If your surgery is scheduled on a Monday, the hospital will be calling you on the Friday prior to your surgery. If you have not heard from anyone by 8pm, please call the hospital supervisor through the hospital  at [phone]. ([place] [phone] or [place] [phone]).    Do not eat or drink anything after midnight the night before your surgery, including candy, mints, lifesavers, or chewing gum. Do not drink alcohol 24hrs before your surgery. Try not to smoke at least 24hrs before your surgery.       Follow the pre surgery showering instructions as listed in the “My Surgical Experience Booklet” or otherwise  provided by your surgeon's office. Do not use a blade to shave the surgical area 1 week before surgery. It is okay to use a clean electric clippers up to 24 hours before surgery. Do not apply any lotions, creams, including makeup, cologne, deodorant, or perfumes after showering on the day of your surgery. Do not use dry shampoo, hair spray, hair gel, or any type of hair products. Follow baby shampoo instructions per surgeon office.     No contact lenses, eye make-up, or artificial eyelashes. Remove nail polish, including gel polish, and any artificial, gel, or acrylic nails if possible. Remove all jewelry including rings and body piercing jewelry.     Wear causal clothing that is easy to take on and off. Consider your type of surgery.Encouraged wear button or zip up style top for day of surgery.     Keep any valuables, jewelry, piercings at home. Please bring any specially ordered equipment (sling, braces) if indicated.    Arrange for a responsible person to drive you to and from the hospital on the day of your surgery. Please confirm the visitor policy for the day of your procedure when you receive your phone call with an arrival time.     Call the surgeon's office with any new illnesses, exposures, or additional questions prior to surgery.    Please reference your “My Surgical Experience Booklet” for additional information to prepare for your upcoming surgery.     Pt. Verbalized an understanding of all instructions reviewed and offers no concerns at this time.

## 2025-05-05 ENCOUNTER — ANESTHESIA EVENT (OUTPATIENT)
Dept: PERIOP | Facility: AMBULARY SURGERY CENTER | Age: 75
End: 2025-05-05
Payer: MEDICARE

## 2025-05-05 ENCOUNTER — ANESTHESIA (OUTPATIENT)
Dept: PERIOP | Facility: AMBULARY SURGERY CENTER | Age: 75
End: 2025-05-05
Payer: MEDICARE

## 2025-05-05 ENCOUNTER — HOSPITAL ENCOUNTER (OUTPATIENT)
Facility: AMBULARY SURGERY CENTER | Age: 75
Setting detail: OUTPATIENT SURGERY
Discharge: HOME | End: 2025-05-05
Attending: OPHTHALMOLOGY | Admitting: OPHTHALMOLOGY
Payer: MEDICARE

## 2025-05-05 VITALS
HEART RATE: 48 BPM | RESPIRATION RATE: 18 BRPM | HEIGHT: 65 IN | WEIGHT: 179 LBS | BODY MASS INDEX: 29.82 KG/M2 | SYSTOLIC BLOOD PRESSURE: 136 MMHG | DIASTOLIC BLOOD PRESSURE: 68 MMHG | TEMPERATURE: 97.8 F | OXYGEN SATURATION: 95 %

## 2025-05-05 DIAGNOSIS — H25.812 COMBINED FORMS OF AGE-RELATED CATARACT OF LEFT EYE: Primary | ICD-10-CM

## 2025-05-05 LAB — GLUCOSE SERPL-MCNC: 181 MG/DL (ref 65–140)

## 2025-05-05 PROCEDURE — V2632 POST CHMBR INTRAOCULAR LENS: HCPCS | Performed by: OPHTHALMOLOGY

## 2025-05-05 PROCEDURE — 82948 REAGENT STRIP/BLOOD GLUCOSE: CPT

## 2025-05-05 DEVICE — STERILE UV AND BLUE LIGHT FILTERING ACRYLIC FOLDABLE ASPHERIC POSTERIOR CHAMBER INTRAOCULAR LENS
Type: IMPLANTABLE DEVICE | Site: EYE | Status: FUNCTIONAL
Brand: CLAREON

## 2025-05-05 RX ORDER — KETOROLAC TROMETHAMINE 5 MG/ML
1 SOLUTION OPHTHALMIC
Status: COMPLETED | OUTPATIENT
Start: 2025-05-05 | End: 2025-05-05

## 2025-05-05 RX ORDER — MIDAZOLAM HYDROCHLORIDE 2 MG/2ML
INJECTION, SOLUTION INTRAMUSCULAR; INTRAVENOUS AS NEEDED
Status: DISCONTINUED | OUTPATIENT
Start: 2025-05-05 | End: 2025-05-05

## 2025-05-05 RX ORDER — GATIFLOXACIN 5 MG/ML
SOLUTION/ DROPS OPHTHALMIC AS NEEDED
Status: DISCONTINUED | OUTPATIENT
Start: 2025-05-05 | End: 2025-05-05 | Stop reason: HOSPADM

## 2025-05-05 RX ORDER — TETRACAINE HYDROCHLORIDE 5 MG/ML
1 SOLUTION OPHTHALMIC ONCE
Status: COMPLETED | OUTPATIENT
Start: 2025-05-05 | End: 2025-05-05

## 2025-05-05 RX ORDER — POVIDONE-IODINE 5 %
SOLUTION, NON-ORAL OPHTHALMIC (EYE) AS NEEDED
Status: DISCONTINUED | OUTPATIENT
Start: 2025-05-05 | End: 2025-05-05 | Stop reason: HOSPADM

## 2025-05-05 RX ORDER — BALANCED SALT SOLUTION 6.4; .75; .48; .3; 3.9; 1.7 MG/ML; MG/ML; MG/ML; MG/ML; MG/ML; MG/ML
SOLUTION OPHTHALMIC AS NEEDED
Status: DISCONTINUED | OUTPATIENT
Start: 2025-05-05 | End: 2025-05-05 | Stop reason: HOSPADM

## 2025-05-05 RX ORDER — GATIFLOXACIN 5 MG/ML
1 SOLUTION/ DROPS OPHTHALMIC 2 TIMES DAILY
Start: 2025-05-05

## 2025-05-05 RX ORDER — KETOROLAC TROMETHAMINE 5 MG/ML
1 SOLUTION OPHTHALMIC 4 TIMES DAILY
Start: 2025-05-05

## 2025-05-05 RX ORDER — SODIUM CHLORIDE, SODIUM LACTATE, POTASSIUM CHLORIDE, CALCIUM CHLORIDE 600; 310; 30; 20 MG/100ML; MG/100ML; MG/100ML; MG/100ML
20 INJECTION, SOLUTION INTRAVENOUS CONTINUOUS
Status: DISCONTINUED | OUTPATIENT
Start: 2025-05-05 | End: 2025-05-06 | Stop reason: HOSPADM

## 2025-05-05 RX ORDER — LIDOCAINE HYDROCHLORIDE 20 MG/ML
1 JELLY TOPICAL
Status: COMPLETED | OUTPATIENT
Start: 2025-05-05 | End: 2025-05-05

## 2025-05-05 RX ORDER — TETRACAINE HYDROCHLORIDE 5 MG/ML
SOLUTION OPHTHALMIC AS NEEDED
Status: DISCONTINUED | OUTPATIENT
Start: 2025-05-05 | End: 2025-05-05 | Stop reason: HOSPADM

## 2025-05-05 RX ORDER — CYCLOPENTOLATE HYDROCHLORIDE 10 MG/ML
1 SOLUTION/ DROPS OPHTHALMIC
Status: COMPLETED | OUTPATIENT
Start: 2025-05-05 | End: 2025-05-05

## 2025-05-05 RX ORDER — PREDNISOLONE ACETATE 10 MG/ML
1 SUSPENSION/ DROPS OPHTHALMIC 4 TIMES DAILY
Start: 2025-05-05

## 2025-05-05 RX ORDER — PHENYLEPHRINE HYDROCHLORIDE 25 MG/ML
1 SOLUTION/ DROPS OPHTHALMIC
Status: COMPLETED | OUTPATIENT
Start: 2025-05-05 | End: 2025-05-05

## 2025-05-05 RX ADMIN — PHENYLEPHRINE HYDROCHLORIDE 1 DROP: 25 SOLUTION/ DROPS OPHTHALMIC at 11:30

## 2025-05-05 RX ADMIN — PHENYLEPHRINE HYDROCHLORIDE 1 DROP: 25 SOLUTION/ DROPS OPHTHALMIC at 11:01

## 2025-05-05 RX ADMIN — MIDAZOLAM 2 MG: 1 INJECTION INTRAMUSCULAR; INTRAVENOUS at 11:36

## 2025-05-05 RX ADMIN — TETRACAINE HYDROCHLORIDE 1 DROP: 5 SOLUTION OPHTHALMIC at 10:46

## 2025-05-05 RX ADMIN — KETOROLAC TROMETHAMINE 1 DROP: 5 SOLUTION OPHTHALMIC at 10:46

## 2025-05-05 RX ADMIN — CYCLOPENTOLATE HYDROCHLORIDE 1 DROP: 10 SOLUTION/ DROPS OPHTHALMIC at 11:01

## 2025-05-05 RX ADMIN — LIDOCAINE HYDROCHLORIDE 1 APPLICATION: 20 JELLY TOPICAL at 11:01

## 2025-05-05 RX ADMIN — KETOROLAC TROMETHAMINE 1 DROP: 5 SOLUTION OPHTHALMIC at 11:01

## 2025-05-05 RX ADMIN — LIDOCAINE HYDROCHLORIDE 1 APPLICATION: 20 JELLY TOPICAL at 11:15

## 2025-05-05 RX ADMIN — KETOROLAC TROMETHAMINE 1 DROP: 5 SOLUTION OPHTHALMIC at 11:30

## 2025-05-05 RX ADMIN — PHENYLEPHRINE HYDROCHLORIDE 1 DROP: 25 SOLUTION/ DROPS OPHTHALMIC at 11:15

## 2025-05-05 RX ADMIN — CYCLOPENTOLATE HYDROCHLORIDE 1 DROP: 10 SOLUTION/ DROPS OPHTHALMIC at 11:30

## 2025-05-05 RX ADMIN — PHENYLEPHRINE HYDROCHLORIDE 1 DROP: 25 SOLUTION/ DROPS OPHTHALMIC at 10:46

## 2025-05-05 RX ADMIN — CYCLOPENTOLATE HYDROCHLORIDE 1 DROP: 10 SOLUTION/ DROPS OPHTHALMIC at 11:15

## 2025-05-05 RX ADMIN — KETOROLAC TROMETHAMINE 1 DROP: 5 SOLUTION OPHTHALMIC at 11:15

## 2025-05-05 RX ADMIN — CYCLOPENTOLATE HYDROCHLORIDE 1 DROP: 10 SOLUTION/ DROPS OPHTHALMIC at 10:46

## 2025-05-05 RX ADMIN — LIDOCAINE HYDROCHLORIDE 1 APPLICATION: 20 JELLY TOPICAL at 10:46

## 2025-05-05 NOTE — DISCHARGE INSTR - AVS FIRST PAGE
Dr. Kaplan Cataract Instructions    Activity:     1.  No Driving until instructed   2.  Keep shield on until seen tomorrow except when administering drops   3.  No heavy lifting   4.  No water in eye     Diet:     1.  Resume normal diet    Normal Symptoms:     1.  Mild Headache   2. Scratchy or picky feeling around eye    Call the office if:     1.  You have any questions or concerns   2.  If eye pain is not relieved by extra strength tylenol    Office phone number:  [phone]      Next appointment:     1.  See Dr Kaplan at his office tomorrow as scheduled   __________________________________________________________   2.  Bring blue eye kit with you and eyedrops to the office    A new set of comprehensive instructions will be given and reviewed with you during your office visit tomorrow.

## 2025-05-05 NOTE — OP NOTE
OPERATIVE REPORT    PATIENT NAME: Moris Doshi    :  1950  MRN: [id number]  Pt Location: [facility] OR ROOM 01    Surgery Date: 2025    Surgeons and Role:     * Zaid Kaplan MD - Primary    Age-related nuclear cataract, left eye [H25.12]  Posterior subcapsular polar age-related cataract, left eye [H25.042]    Post-Op Diagnosis Codes:     * Age-related nuclear cataract, left eye [H25.12]     * Posterior subcapsular polar age-related cataract, left eye [H25.042]    Procedure(s):  EXTRACTION EXTRACAPSULAR CATARACT PHACO INTRAOCULAR LENS (IOL)    Anesthesia Type:   IV Sedation with Anesthesia    Operative Indications:  Age-related nuclear cataract, left eye [H25.12]  Posterior subcapsular polar age-related cataract, left eye [H25.042]  Decreased vision to 20/40. With problems driving.  Pt requested cataract sx the left eye    Procedure and Technique:    Procedure Details     The patient was brought in the OR in stable condition and placed on the operative table. The left eye was prepped and draped in the usual sterile manner. Attention was directed to the left eye where a lid speculum was placed. A 2.4 mm clear corneal incision was made temperally. 1 cc of Shugarcaine was irrigated into the anterior chamber followed by viscoat. The side port incision was placed superiorly. The capsularrhexis was made and the nucleus was hydrodissected with BSS. The nucleus was then removed with the phaco handpiece followed by removal of the cortical material with the I/A handpiece. The capsular bag was then filled with Provisc. The IOL was folded and placed in to the capsular bag and centered well. The remaining Provisc was removed from the eye with the I/A. The wounds were hydrated with BSS and found to be water tight. The lid speculum was removed and 2 drops of Gatifloxicin were placed over the cornea. A protective eye shield was taped over the eye and the patient went to PACU in stable condition. I will see the patient in  the office tomorrow and the expected post op period is a few weeks.       Complications: None        Disposition: PACU   Condition: Stable    SIGNATURE: Zaid Kaplan MD  DATE: May 5, 2025  TIME: 11:59 AM

## 2025-05-05 NOTE — ANESTHESIA POSTPROCEDURE EVALUATION
Post-Op Assessment Note    CV Status:  Stable  Pain Score: 0    Pain management: adequate       Mental Status:  Sleepy   Hydration Status:  Stable   PONV Controlled:  None   Airway Patency:  Patent  Two or more mitigation strategies used for obstructive sleep apnea   Post Op Vitals Reviewed: Yes    No anethesia notable event occurred.    Staff: CRNA           Last Filed PACU Vitals:  Vitals Value Taken Time   Temp     Pulse 60    /78    Resp 20    SpO2 96

## 2025-05-05 NOTE — ANESTHESIA PREPROCEDURE EVALUATION
Procedure:  EXTRACTION EXTRACAPSULAR CATARACT PHACO INTRAOCULAR LENS (IOL) (Left: Eye)    Relevant Problems   CARDIO   (+) Coronary artery disease involving native coronary artery of native heart without angina pectoris   (+) Hypertension, accelerated   (+) Mixed hyperlipidemia      ENDO   (+) Type 2 diabetes mellitus, without long-term current use of insulin (HCC)      /RENAL   (+) Stage 3 chronic kidney disease, unspecified whether stage 3a or 3b CKD (HCC)      PULMONARY   (+) MOHIT on CPAP   (+) Obstructive sleep apnea syndrome        Physical Exam    Airway    Mallampati score: II  TM Distance: >3 FB  Neck ROM: full     Dental   No notable dental hx     Cardiovascular      Pulmonary      Other Findings        Anesthesia Plan  ASA Score- 3     Anesthesia Type- IV sedation with anesthesia with ASA Monitors.         Additional Monitors:     Airway Plan:            Plan Factors-    Chart reviewed.    Patient summary reviewed.                  Induction- intravenous.    Postoperative Plan-     Perioperative Resuscitation Plan - Level 1 - Full Code.       Informed Consent- Anesthetic plan and risks discussed with patient.  I personally reviewed this patient with the CRNA. Discussed and agreed on the Anesthesia Plan with the CRNA..      NPO Status:  No vitals data found for the desired time range.

## 2025-05-05 NOTE — ANESTHESIA POSTPROCEDURE EVALUATION
Post-Op Assessment Note            No anethesia notable event occurred.            Last Filed PACU Vitals:  Vitals Value Taken Time   Temp     Pulse 48 05/05/25 1158   /68 05/05/25 1158   Resp 18 05/05/25 1158   SpO2 95 % 05/05/25 1158       Modified Luisa:     Vitals Value Taken Time   Activity 2 05/05/25 1158   Respiration 2 05/05/25 1158   Circulation 2 05/05/25 1158   Consciousness 2 05/05/25 1158   Oxygen Saturation 2 05/05/25 1158     Modified Luisa Score: 10

## 2025-05-09 ENCOUNTER — APPOINTMENT (OUTPATIENT)
Dept: LAB | Age: 75
End: 2025-05-09
Payer: MEDICARE

## 2025-05-09 DIAGNOSIS — R53.83 FATIGUE, UNSPECIFIED TYPE: ICD-10-CM

## 2025-05-09 DIAGNOSIS — E08.00 DIABETES MELLITUS DUE TO UNDERLYING CONDITION WITH HYPEROSMOLARITY WITHOUT COMA, WITHOUT LONG-TERM CURRENT USE OF INSULIN (HCC): ICD-10-CM

## 2025-05-09 LAB
ALBUMIN SERPL BCG-MCNC: 4.2 G/DL (ref 3.5–5)
ALP SERPL-CCNC: 51 U/L (ref 34–104)
ALT SERPL W P-5'-P-CCNC: 7 U/L (ref 7–52)
ANION GAP SERPL CALCULATED.3IONS-SCNC: 7 MMOL/L (ref 4–13)
AST SERPL W P-5'-P-CCNC: 23 U/L (ref 13–39)
BASOPHILS # BLD AUTO: 0.04 THOUSANDS/ÂΜL (ref 0–0.1)
BASOPHILS NFR BLD AUTO: 1 % (ref 0–1)
BILIRUB SERPL-MCNC: 0.62 MG/DL (ref 0.2–1)
BUN SERPL-MCNC: 22 MG/DL (ref 5–25)
CALCIUM SERPL-MCNC: 9.7 MG/DL (ref 8.4–10.2)
CHLORIDE SERPL-SCNC: 105 MMOL/L (ref 96–108)
CO2 SERPL-SCNC: 29 MMOL/L (ref 21–32)
CREAT SERPL-MCNC: 1.2 MG/DL (ref 0.6–1.3)
EOSINOPHIL # BLD AUTO: 0.15 THOUSAND/ÂΜL (ref 0–0.61)
EOSINOPHIL NFR BLD AUTO: 3 % (ref 0–6)
ERYTHROCYTE [DISTWIDTH] IN BLOOD BY AUTOMATED COUNT: 12.3 % (ref 11.6–15.1)
EST. AVERAGE GLUCOSE BLD GHB EST-MCNC: 134 MG/DL
GFR SERPL CREATININE-BSD FRML MDRD: 58 ML/MIN/1.73SQ M
GLUCOSE P FAST SERPL-MCNC: 118 MG/DL (ref 65–99)
HBA1C MFR BLD: 6.3 %
HCT VFR BLD AUTO: 38.7 % (ref 36.5–49.3)
HGB BLD-MCNC: 13.5 G/DL (ref 12–17)
IMM GRANULOCYTES # BLD AUTO: 0.06 THOUSAND/UL (ref 0–0.2)
IMM GRANULOCYTES NFR BLD AUTO: 1 % (ref 0–2)
LYMPHOCYTES # BLD AUTO: 1.7 THOUSANDS/ÂΜL (ref 0.6–4.47)
LYMPHOCYTES NFR BLD AUTO: 32 % (ref 14–44)
MCH RBC QN AUTO: 33.5 PG (ref 26.8–34.3)
MCHC RBC AUTO-ENTMCNC: 34.9 G/DL (ref 31.4–37.4)
MCV RBC AUTO: 96 FL (ref 82–98)
MONOCYTES # BLD AUTO: 0.53 THOUSAND/ÂΜL (ref 0.17–1.22)
MONOCYTES NFR BLD AUTO: 10 % (ref 4–12)
NEUTROPHILS # BLD AUTO: 2.86 THOUSANDS/ÂΜL (ref 1.85–7.62)
NEUTS SEG NFR BLD AUTO: 53 % (ref 43–75)
NRBC BLD AUTO-RTO: 0 /100 WBCS
PLATELET # BLD AUTO: 172 THOUSANDS/UL (ref 149–390)
PMV BLD AUTO: 10.8 FL (ref 8.9–12.7)
POTASSIUM SERPL-SCNC: 4 MMOL/L (ref 3.5–5.3)
PROT SERPL-MCNC: 6.6 G/DL (ref 6.4–8.4)
RBC # BLD AUTO: 4.03 MILLION/UL (ref 3.88–5.62)
SODIUM SERPL-SCNC: 141 MMOL/L (ref 135–147)
T3 SERPL-MCNC: 0.8 NG/ML (ref 0.9–1.8)
T4 FREE SERPL-MCNC: 0.84 NG/DL (ref 0.61–1.12)
TSH SERPL DL<=0.05 MIU/L-ACNC: 2.12 UIU/ML (ref 0.45–4.5)
WBC # BLD AUTO: 5.34 THOUSAND/UL (ref 4.31–10.16)

## 2025-05-09 PROCEDURE — 36415 COLL VENOUS BLD VENIPUNCTURE: CPT

## 2025-05-09 PROCEDURE — 84480 ASSAY TRIIODOTHYRONINE (T3): CPT

## 2025-05-09 PROCEDURE — 80053 COMPREHEN METABOLIC PANEL: CPT

## 2025-05-09 PROCEDURE — 84443 ASSAY THYROID STIM HORMONE: CPT

## 2025-05-09 PROCEDURE — 84439 ASSAY OF FREE THYROXINE: CPT

## 2025-05-09 PROCEDURE — 83036 HEMOGLOBIN GLYCOSYLATED A1C: CPT

## 2025-05-09 PROCEDURE — 85025 COMPLETE CBC W/AUTO DIFF WBC: CPT

## 2025-05-22 PROBLEM — G47.33 OSA (OBSTRUCTIVE SLEEP APNEA): Status: ACTIVE | Noted: 2025-05-22

## 2025-06-30 ENCOUNTER — ANESTHESIA (OUTPATIENT)
Dept: PERIOP | Facility: AMBULARY SURGERY CENTER | Age: 75
End: 2025-06-30
Payer: MEDICARE

## 2025-06-30 ENCOUNTER — HOSPITAL ENCOUNTER (OUTPATIENT)
Facility: AMBULARY SURGERY CENTER | Age: 75
Setting detail: OUTPATIENT SURGERY
Discharge: HOME | End: 2025-06-30
Attending: OPHTHALMOLOGY | Admitting: OPHTHALMOLOGY
Payer: MEDICARE

## 2025-06-30 ENCOUNTER — ANESTHESIA EVENT (OUTPATIENT)
Dept: PERIOP | Facility: AMBULARY SURGERY CENTER | Age: 75
End: 2025-06-30
Payer: MEDICARE

## 2025-06-30 VITALS
SYSTOLIC BLOOD PRESSURE: 143 MMHG | TEMPERATURE: 97.8 F | RESPIRATION RATE: 18 BRPM | DIASTOLIC BLOOD PRESSURE: 65 MMHG | HEART RATE: 54 BPM | OXYGEN SATURATION: 96 %

## 2025-06-30 DIAGNOSIS — H25.812 COMBINED FORMS OF AGE-RELATED CATARACT OF LEFT EYE: ICD-10-CM

## 2025-06-30 LAB — GLUCOSE SERPL-MCNC: 170 MG/DL (ref 65–140)

## 2025-06-30 PROCEDURE — V2632 POST CHMBR INTRAOCULAR LENS: HCPCS | Performed by: OPHTHALMOLOGY

## 2025-06-30 PROCEDURE — 82948 REAGENT STRIP/BLOOD GLUCOSE: CPT

## 2025-06-30 DEVICE — STERILE UV AND BLUE LIGHT FILTERING ACRYLIC FOLDABLE ASPHERIC POSTERIOR CHAMBER INTRAOCULAR LENS
Type: IMPLANTABLE DEVICE | Site: EYE | Status: FUNCTIONAL
Brand: CLAREON

## 2025-06-30 RX ORDER — TETRACAINE HYDROCHLORIDE 5 MG/ML
SOLUTION OPHTHALMIC AS NEEDED
Status: DISCONTINUED | OUTPATIENT
Start: 2025-06-30 | End: 2025-06-30 | Stop reason: HOSPADM

## 2025-06-30 RX ORDER — POVIDONE-IODINE 5 %
SOLUTION, NON-ORAL OPHTHALMIC (EYE) AS NEEDED
Status: DISCONTINUED | OUTPATIENT
Start: 2025-06-30 | End: 2025-06-30 | Stop reason: HOSPADM

## 2025-06-30 RX ORDER — TETRACAINE HYDROCHLORIDE 5 MG/ML
1 SOLUTION OPHTHALMIC ONCE
Status: COMPLETED | OUTPATIENT
Start: 2025-06-30 | End: 2025-06-30

## 2025-06-30 RX ORDER — PHENYLEPHRINE HYDROCHLORIDE 25 MG/ML
1 SOLUTION/ DROPS OPHTHALMIC
Status: COMPLETED | OUTPATIENT
Start: 2025-06-30 | End: 2025-06-30

## 2025-06-30 RX ORDER — GATIFLOXACIN 5 MG/ML
SOLUTION/ DROPS OPHTHALMIC AS NEEDED
Status: DISCONTINUED | OUTPATIENT
Start: 2025-06-30 | End: 2025-06-30 | Stop reason: HOSPADM

## 2025-06-30 RX ORDER — CYCLOPENTOLATE HYDROCHLORIDE 10 MG/ML
1 SOLUTION/ DROPS OPHTHALMIC
Status: COMPLETED | OUTPATIENT
Start: 2025-06-30 | End: 2025-06-30

## 2025-06-30 RX ORDER — BALANCED SALT SOLUTION 6.4; .75; .48; .3; 3.9; 1.7 MG/ML; MG/ML; MG/ML; MG/ML; MG/ML; MG/ML
SOLUTION OPHTHALMIC AS NEEDED
Status: DISCONTINUED | OUTPATIENT
Start: 2025-06-30 | End: 2025-06-30 | Stop reason: HOSPADM

## 2025-06-30 RX ORDER — MIDAZOLAM HYDROCHLORIDE 2 MG/2ML
INJECTION, SOLUTION INTRAMUSCULAR; INTRAVENOUS AS NEEDED
Status: DISCONTINUED | OUTPATIENT
Start: 2025-06-30 | End: 2025-06-30

## 2025-06-30 RX ORDER — KETOROLAC TROMETHAMINE 5 MG/ML
1 SOLUTION OPHTHALMIC 4 TIMES DAILY
Start: 2025-06-30

## 2025-06-30 RX ORDER — PREDNISOLONE ACETATE 10 MG/ML
1 SUSPENSION/ DROPS OPHTHALMIC 4 TIMES DAILY
Start: 2025-06-30

## 2025-06-30 RX ORDER — KETOROLAC TROMETHAMINE 5 MG/ML
1 SOLUTION OPHTHALMIC
Status: COMPLETED | OUTPATIENT
Start: 2025-06-30 | End: 2025-06-30

## 2025-06-30 RX ORDER — SODIUM CHLORIDE, SODIUM LACTATE, POTASSIUM CHLORIDE, CALCIUM CHLORIDE 600; 310; 30; 20 MG/100ML; MG/100ML; MG/100ML; MG/100ML
20 INJECTION, SOLUTION INTRAVENOUS CONTINUOUS
Status: DISCONTINUED | OUTPATIENT
Start: 2025-06-30 | End: 2025-06-30 | Stop reason: HOSPADM

## 2025-06-30 RX ORDER — GATIFLOXACIN 5 MG/ML
1 SOLUTION/ DROPS OPHTHALMIC 2 TIMES DAILY
Start: 2025-06-30

## 2025-06-30 RX ORDER — LIDOCAINE HYDROCHLORIDE 20 MG/ML
1 JELLY TOPICAL
Status: COMPLETED | OUTPATIENT
Start: 2025-06-30 | End: 2025-06-30

## 2025-06-30 RX ADMIN — LIDOCAINE HYDROCHLORIDE 1 APPLICATION: 20 JELLY TOPICAL at 11:11

## 2025-06-30 RX ADMIN — PHENYLEPHRINE HYDROCHLORIDE 1 DROP: 25 SOLUTION/ DROPS OPHTHALMIC at 10:55

## 2025-06-30 RX ADMIN — PHENYLEPHRINE HYDROCHLORIDE 1 DROP: 25 SOLUTION/ DROPS OPHTHALMIC at 11:36

## 2025-06-30 RX ADMIN — CYCLOPENTOLATE HYDROCHLORIDE 1 DROP: 10 SOLUTION OPHTHALMIC at 11:21

## 2025-06-30 RX ADMIN — PHENYLEPHRINE HYDROCHLORIDE 1 DROP: 25 SOLUTION/ DROPS OPHTHALMIC at 11:11

## 2025-06-30 RX ADMIN — PHENYLEPHRINE HYDROCHLORIDE 1 DROP: 25 SOLUTION/ DROPS OPHTHALMIC at 11:21

## 2025-06-30 RX ADMIN — LIDOCAINE HYDROCHLORIDE 1 APPLICATION: 20 JELLY TOPICAL at 10:55

## 2025-06-30 RX ADMIN — KETOROLAC TROMETHAMINE 1 DROP: 5 SOLUTION OPHTHALMIC at 11:11

## 2025-06-30 RX ADMIN — CYCLOPENTOLATE HYDROCHLORIDE 1 DROP: 10 SOLUTION OPHTHALMIC at 10:55

## 2025-06-30 RX ADMIN — KETOROLAC TROMETHAMINE 1 DROP: 5 SOLUTION OPHTHALMIC at 10:55

## 2025-06-30 RX ADMIN — CYCLOPENTOLATE HYDROCHLORIDE 1 DROP: 10 SOLUTION OPHTHALMIC at 11:36

## 2025-06-30 RX ADMIN — KETOROLAC TROMETHAMINE 1 DROP: 5 SOLUTION OPHTHALMIC at 11:36

## 2025-06-30 RX ADMIN — KETOROLAC TROMETHAMINE 1 DROP: 5 SOLUTION OPHTHALMIC at 11:21

## 2025-06-30 RX ADMIN — TETRACAINE HYDROCHLORIDE 1 DROP: 5 SOLUTION OPHTHALMIC at 10:55

## 2025-06-30 RX ADMIN — CYCLOPENTOLATE HYDROCHLORIDE 1 DROP: 10 SOLUTION OPHTHALMIC at 11:11

## 2025-06-30 RX ADMIN — LIDOCAINE HYDROCHLORIDE 1 APPLICATION: 20 JELLY TOPICAL at 11:21

## 2025-06-30 RX ADMIN — MIDAZOLAM 2 MG: 1 INJECTION INTRAMUSCULAR; INTRAVENOUS at 12:20

## 2025-06-30 NOTE — ANESTHESIA POSTPROCEDURE EVALUATION
Post-Op Assessment Note    CV Status:  Stable    Pain management: adequate       Mental Status:  Alert and awake   Hydration Status:  Euvolemic   PONV Controlled:  Controlled   Airway Patency:  Patent     Post Op Vitals Reviewed: Yes    No anethesia notable event occurred.            Last Filed PACU Vitals:  Vitals Value Taken Time   Temp     Pulse 54 06/30/25 12:45   /65 06/30/25 12:45   Resp 18 06/30/25 12:45   SpO2 96 % 06/30/25 12:45       Modified Luisa:     Vitals Value Taken Time   Activity 2 06/30/25 12:45   Respiration 2 06/30/25 12:45   Circulation 2 06/30/25 12:45   Consciousness 2 06/30/25 12:45   Oxygen Saturation 2 06/30/25 12:45     Modified Luisa Score: 10

## 2025-06-30 NOTE — ANESTHESIA POSTPROCEDURE EVALUATION
Post-Op Assessment Note    CV Status:  Stable  Pain Score: 0    Pain management: adequate       Mental Status:  Alert   Hydration Status:  Stable   PONV Controlled:  None   Airway Patency:  Patent     Post Op Vitals Reviewed: Yes    No anethesia notable event occurred.    Staff: Anesthesiologist, CRNA           Last Filed PACU Vitals:  Vitals Value Taken Time   Temp     Pulse 70    /78    Resp 14    SpO2 98

## 2025-06-30 NOTE — ANESTHESIA PREPROCEDURE EVALUATION
Procedure:  EXTRACTION EXTRACAPSULAR CATARACT PHACO INTRAOCULAR LENS (IOL) (Right: Eye)    Relevant Problems   CARDIO   (+) Coronary artery disease involving native coronary artery of native heart without angina pectoris   (+) Hypertension, accelerated   (+) Mixed hyperlipidemia      ENDO   (+) Type 2 diabetes mellitus, without long-term current use of insulin (HCC)      /RENAL   (+) Stage 3 chronic kidney disease, unspecified whether stage 3a or 3b CKD (HCC)      PULMONARY   (+) MOHIT (obstructive sleep apnea)   (+) MOHIT on CPAP   (+) Obstructive sleep apnea syndrome      Neurology/Sleep   (+) Parkinson disease (HCC)      Surgery/Wound/Pain   (+) History of coronary artery stent placement      Other   (+) History of acute anterior wall MI        Physical Exam    Airway     Mallampati score: III  TM Distance: >3 FB  Neck ROM: full      Cardiovascular  Cardiovascular exam normal    Dental   Comment: Denies loose teeth     Pulmonary  Pulmonary exam normal     Neurological    He appears awake, alert and oriented x3.      Other Findings        Anesthesia Plan  ASA Score- 3     Anesthesia Type- IV sedation with anesthesia with ASA Monitors.         Additional Monitors:     Airway Plan:            Plan Factors-Exercise tolerance (METS): >4 METS.    Chart reviewed.   Existing labs reviewed. Patient summary reviewed.    Patient is not a current smoker.              Induction- intravenous.    Postoperative Plan- .   Monitoring Plan - Monitoring plan - standard ASA monitoring          Informed Consent- Anesthetic plan and risks discussed with patient.  I personally reviewed this patient with the CRNA. Discussed and agreed on the Anesthesia Plan with the CRNA..      NPO Status:  Vitals Value Taken Time   Date of last liquid 06/30/25 06/30/25 11:03   Time of last liquid 0800 06/30/25 11:03   Date of last solid 06/29/25 06/30/25 11:03   Time of last solid 2200 06/30/25 11:03

## 2025-06-30 NOTE — OP NOTE
OPERATIVE REPORT    PATIENT NAME: Moris Doshi    :  1950  MRN: [id number]  Pt Location: [facility] OR ROOM 01    Surgery Date: 2025    Surgeons and Role:     * Zaid Kaplan MD - Primary    Age-related nuclear cataract, right eye [H25.11]  Posterior subcapsular polar age-related cataract, right eye [H25.041]    Post-Op Diagnosis Codes:     * Age-related nuclear cataract, right eye [H25.11]     * Posterior subcapsular polar age-related cataract, right eye [H25.041]    Procedure(s):  EXTRACTION EXTRACAPSULAR CATARACT PHACO INTRAOCULAR LENS (IOL)    Anesthesia Type:   IV Sedation with Anesthesia    Operative Indications:  Age-related nuclear cataract, right eye [H25.11]  Posterior subcapsular polar age-related cataract, right eye [H25.041]  Decreased vision to 20/40. With problems driving.  Pt requested cataract sx the right eye    Procedure and Technique:    Procedure Details     The patient was brought in the OR in stable condition and placed on the operative table. The right eye was prepped and draped in the usual sterile manner. Attention was directed to the right eye where a lid speculum was placed. A 2.4 mm clear corneal incision was made temperally. 1/2 cc of 1% MPF Lidocaine was irrigated into the anterior chamber followed by viscoat. The side port incision was placed superiorly. The capsularrhexis was made and the nucleus was hydrodissected with BSS. The nucleus was then removed with the phaco handpiece followed by removal of the cortical material with the I/A handpiece. The capsular bag was then filled with Provisc. The IOL was folded and placed in to the capsular bag and centered well. The remaining Provisc was removed from the eye with the I/A. The wounds were hydrated with BSS and found to be water tight. The lid speculum was removed and 2 drops of Gatifloxicin were placed over the cornea. A protective eye shield was taped over the eye and the patient went to PACU in stable condition. I will  see the patient in the office tomorrow and the expected post op period is a few weeks.       Complications: None        Disposition: PACU   Condition: Stable    SIGNATURE: Zaid Kaplan MD  DATE: June 30, 2025  TIME: 12:44 PM

## 2025-07-09 RX ORDER — ATROPINE SULFATE 10 MG/ML
1 SOLUTION/ DROPS OPHTHALMIC 3 TIMES DAILY
COMMUNITY
Start: 2025-06-25

## 2025-07-09 RX ORDER — FENOFIBRATE 200 MG/1
200 CAPSULE ORAL
COMMUNITY
Start: 2025-04-10

## 2025-07-09 RX ORDER — ALBUTEROL SULFATE 90 UG/1
2 INHALANT RESPIRATORY (INHALATION)
COMMUNITY
Start: 2025-06-23

## 2025-07-09 RX ORDER — CEFUROXIME AXETIL 500 MG/1
1 TABLET ORAL 2 TIMES DAILY
COMMUNITY
Start: 2025-06-23

## 2025-07-14 ENCOUNTER — OFFICE VISIT (OUTPATIENT)
Dept: UROLOGY | Facility: AMBULATORY SURGERY CENTER | Age: 75
End: 2025-07-14
Payer: MEDICARE

## 2025-07-14 ENCOUNTER — TELEPHONE (OUTPATIENT)
Dept: UROLOGY | Facility: AMBULATORY SURGERY CENTER | Age: 75
End: 2025-07-14

## 2025-07-14 VITALS
DIASTOLIC BLOOD PRESSURE: 56 MMHG | HEART RATE: 60 BPM | OXYGEN SATURATION: 95 % | SYSTOLIC BLOOD PRESSURE: 110 MMHG | BODY MASS INDEX: 29.82 KG/M2 | WEIGHT: 179 LBS | HEIGHT: 65 IN

## 2025-07-14 DIAGNOSIS — Z12.5 SCREENING FOR PROSTATE CANCER: ICD-10-CM

## 2025-07-14 DIAGNOSIS — N18.30 STAGE 3 CHRONIC KIDNEY DISEASE, UNSPECIFIED WHETHER STAGE 3A OR 3B CKD (HCC): ICD-10-CM

## 2025-07-14 DIAGNOSIS — R39.12 BENIGN PROSTATIC HYPERPLASIA WITH WEAK URINARY STREAM: Primary | ICD-10-CM

## 2025-07-14 DIAGNOSIS — N40.1 BENIGN PROSTATIC HYPERPLASIA WITH WEAK URINARY STREAM: Primary | ICD-10-CM

## 2025-07-14 DIAGNOSIS — E11.9 TYPE 2 DIABETES MELLITUS WITHOUT COMPLICATION, WITHOUT LONG-TERM CURRENT USE OF INSULIN (HCC): ICD-10-CM

## 2025-07-14 DIAGNOSIS — R33.9 URINARY RETENTION: ICD-10-CM

## 2025-07-14 LAB — POST-VOID RESIDUAL VOLUME, ML POC: 15 ML

## 2025-07-14 PROCEDURE — 99213 OFFICE O/P EST LOW 20 MIN: CPT

## 2025-07-14 PROCEDURE — 51798 US URINE CAPACITY MEASURE: CPT

## 2025-07-14 RX ORDER — ROSUVASTATIN CALCIUM 10 MG/1
10 TABLET, COATED ORAL EVERY EVENING
COMMUNITY
Start: 2025-06-27

## 2025-07-14 NOTE — ASSESSMENT & PLAN NOTE
Patient denies a known family history for prostate cancer  Patient's last PSA was performed 9/13/2023 and found to be 0.25.  DAX deferred in the office today.  Reviewed that the patient is overdue for repeat PSA.  AUA guidelines of prostate cancer screening reviewed in the office today.  Patient will obtain a PSA in our office will call for results.    Lab Results   Component Value Date    PSA 0.25 09/13/2023    PSA 0.3 06/08/2022    PSA 0.6 05/05/2022

## 2025-07-14 NOTE — ASSESSMENT & PLAN NOTE
Chronic urinary retention secondary to urethral stricture disease requiring intermittent self catheterization once daily with a 16 Portuguese straight tip prelubed catheter to keep the urethra and bladder outlet patent  Patient reports no issues with performing CIC and continues to report once daily for his history of chronic urinary retention  Patient will continue with intermittent self-catheterization once daily with a 16 Portuguese straight tipped catheter for treatment of his chronic urinary retention.

## 2025-07-14 NOTE — TELEPHONE ENCOUNTER
Patient seen in the office on 7/14/2025.  Patient has a history of chronic urinary retention secondary to urethral stricture disease.  Patient performs intermittent self-catheterization 1 time daily with a 16 Kiswahili straight tip prelubed catheter for treatment of his chronic urinary retention.  Please assist with ordering catheter supplies for patient.  Patient's primary coverage is Medicare.

## 2025-07-14 NOTE — PROGRESS NOTES
7/14/2025      Assessment and Plan    74 y.o. male managed by our office    Urinary retention  Chronic urinary retention secondary to urethral stricture disease requiring intermittent self catheterization once daily with a 16 Persian straight tip prelubed catheter to keep the urethra and bladder outlet patent  Patient reports no issues with performing CIC and continues to report once daily for his history of chronic urinary retention  Patient will continue with intermittent self-catheterization once daily with a 16 Persian straight tipped catheter for treatment of his chronic urinary retention.    Benign prostatic hyperplasia with weak urinary stream  Status post greenlight laser procedure performed 12/12/2022  Cystoscopy performed 3/30/2023 noted a open prostatic channel.  Patient currently content with the voiding pattern and we will continue to monitor for worsening/progression of lower urinary tract symptoms    Screening for prostate cancer  Patient denies a known family history for prostate cancer  Patient's last PSA was performed 9/13/2023 and found to be 0.25.  DAX deferred in the office today.  Reviewed that the patient is overdue for repeat PSA.  AUA guidelines of prostate cancer screening reviewed in the office today.  Patient will obtain a PSA in our office will call for results.    Lab Results   Component Value Date    PSA 0.25 09/13/2023    PSA 0.3 06/08/2022    PSA 0.6 05/05/2022            History of Present Illness  Moris Doshi is a 74 y.o. male here for evaluation of BPH with history with retention/lower urinary tract symptoms and prostate cancer screening.  Patient was last seen in the office on 7/9/2024.    Patient has a significant past urological history for a pendulous urethra and urethral stricture and recurrent UTIs.  Patient underwent cystoscopy in the office on 3/30/23 which noted a pendulous urethra with 2 flimsy urethral strictures each approximately 8 Persian in diameter that were easily  traversed with the cystoscope.  Patient was recommended to pursue CIC to keep the urethra patent.  Patient was recommended to utilize a 16 Solomon Islander straight catheter once every day or every other day ideally to taper down to once a week or once every few weeks to keep the urethra patent.    Patient also has a history of BPH and previously underwent greenlight laser procedure on 12/12/2022.  Cystoscopy thereafter revealed an open prostatic channel.  It was noted that the patient's history of urethral stricture disease is likely contributing to his incomplete bladder emptying.    Patient's last PSA was performed 9/13/2023 and found to be 0.25.    Today, the patient reports that he has had difficulty obtaining his catheter supplies since his last office visit.  Patient notes that he has enough to last until September of this year.  Patient does continue to perform self intermittent catheterization with a 16 Solomon Islander straight tip catheter once daily for his history of chronic urinary retention.  Patient reports that he has no issue performing intermittent self-catheterization and that he is able to pass the catheter with ease.  Patient reports no other lower urinary tract complaints today's office visit.    Review of Systems   Constitutional:  Negative for chills and fever.   HENT:  Negative for ear pain and sore throat.    Eyes:  Negative for pain and visual disturbance.   Respiratory:  Negative for cough and shortness of breath.    Cardiovascular:  Negative for chest pain and palpitations.   Gastrointestinal:  Negative for abdominal pain and vomiting.   Genitourinary:  Negative for decreased urine volume, difficulty urinating, dysuria, flank pain, frequency, hematuria and urgency.   Musculoskeletal:  Negative for arthralgias and back pain.   Skin:  Negative for color change and rash.   Neurological:  Negative for seizures and syncope.   All other systems reviewed and are negative.          AUA SYMPTOM SCORE      Flowsheet  "Row Most Recent Value   AUA SYMPTOM SCORE    How often have you had a sensation of not emptying your bladder completely after you finished urinating? 0 (P)     How often have you had to urinate again less than two hours after you finished urinating? 0 (P)     How often have you found you stopped and started again several times when you urinate? 1 (P)     How often have you found it difficult to postpone urination? 0 (P)     How often have you had a weak urinary stream? 0 (P)     How often have you had to push or strain to begin urination? 1 (P)     How many times did you most typically get up to urinate from the time you went to bed at night until the time you got up in the morning? 1 (P)     Quality of Life: If you were to spend the rest of your life with your urinary condition just the way it is now, how would you feel about that? 1 (P)     AUA SYMPTOM SCORE 3 (P)               Vitals  Vitals:    07/14/25 1055   BP: 110/56   BP Location: Left arm   Patient Position: Sitting   Cuff Size: Standard   Pulse: 60   SpO2: 95%   Weight: 81.2 kg (179 lb)   Height: 5' 5\" (1.651 m)       Physical Exam  Vitals reviewed.   Constitutional:       General: He is not in acute distress.     Appearance: Normal appearance. He is not ill-appearing.   HENT:      Head: Normocephalic and atraumatic.      Nose: Nose normal.     Eyes:      General: No scleral icterus.    Pulmonary:      Effort: No respiratory distress.   Abdominal:      General: Abdomen is flat. There is no distension.      Palpations: Abdomen is soft.      Tenderness: There is no abdominal tenderness.     Musculoskeletal:         General: Normal range of motion.      Cervical back: Normal range of motion.     Skin:     General: Skin is warm.      Coloration: Skin is not jaundiced.     Neurological:      Mental Status: He is alert and oriented to person, place, and time.      Gait: Gait normal.     Psychiatric:         Mood and Affect: Mood normal.         Behavior: " Behavior normal.           Past History  Past Medical History[1]  Social History[2]  Tobacco Use History[3]  Family History[4]    The following portions of the patient's history were reviewed and updated as appropriate: allergies, current medications, past medical history, past social history, past surgical history and problem list.    Results  No results found for this or any previous visit (from the past hour).]  Lab Results   Component Value Date    PSA 0.25 09/13/2023    PSA 0.3 06/08/2022    PSA 0.6 05/05/2022    PSA 0.6 03/24/2022     Lab Results   Component Value Date    CALCIUM 9.7 05/09/2025    K 4.0 05/09/2025    CO2 29 05/09/2025     05/09/2025    BUN 22 05/09/2025    CREATININE 1.20 05/09/2025     Lab Results   Component Value Date    WBC 5.34 05/09/2025    HGB 13.5 05/09/2025    HCT 38.7 05/09/2025    MCV 96 05/09/2025     05/09/2025             [1]   Past Medical History:  Diagnosis Date    Asthma     Coronary artery disease 2016    CPAP (continuous positive airway pressure) dependence     Diabetes mellitus (HCC) 2005    GERD (gastroesophageal reflux disease)     Hyperlipidemia     Hypertension 2005    Interstitial lung disease (HCC)     Kidney stone 1999    Myocardial infarction (HCC)     Parkinson disease (HCC)     Pneumonia     Sleep apnea     Urinary tract infection 1998   [2]   Social History  Socioeconomic History    Marital status: /Civil Union   Tobacco Use    Smoking status: Never     Passive exposure: Never    Smokeless tobacco: Never   Vaping Use    Vaping status: Never Used   Substance and Sexual Activity    Alcohol use: Yes     Comment: occasional once or  twice a mothn a beer    Drug use: Never    Sexual activity: Not Currently     Social Drivers of Health     Financial Resource Strain: Not At Risk (2/3/2025)    Received from [facility]    Financial Insecurity     In the last 12 months did you skip medications to save money?: No     In the last 12  months was there a time when you needed to see a doctor but could not because of cost?: No   Food Insecurity: No Food Insecurity (6/30/2025)    Nursing - Inadequate Food Risk Classification     Ran Out of Food in the Last Year: Never true   Transportation Needs: No Transportation Needs (6/30/2025)    Nursing - Transportation Risk Classification     Lack of Transportation: No   Social Connections: Socially Integrated (2/3/2025)    [facility]    Social Connection     Do you often feel lonely?: No   Intimate Partner Violence: Unknown (6/30/2025)    Nursing IPS     Physically Hurt by Someone: No     Hurt or Threatened by Someone: No   Housing Stability: Unknown (6/30/2025)    Nursing: Inadequate Housing Risk Classification     Unable to Pay for Housing in the Last Year: No     Has Housing: No   [3]   Social History  Tobacco Use   Smoking Status Never    Passive exposure: Never   Smokeless Tobacco Never   [4]   Family History  Problem Relation Name Age of Onset    Lymphoma Father Moris     Cancer Father Moris     Heart disease Father Moris     Urolithiasis Father Moris     Heart defect Mother Flora     Diabetes Mother Flora     Urolithiasis Mother Flora     Asthma Paternal Grandmother      Heart disease Maternal Grandfather Lennox     Hypertension Maternal Grandfather Lennox     Seizures Brother          Epilepsy    Uterine cancer Sister      Asthma Child

## 2025-07-14 NOTE — ASSESSMENT & PLAN NOTE
Status post greenlight laser procedure performed 12/12/2022  Cystoscopy performed 3/30/2023 noted a open prostatic channel.  Patient currently content with the voiding pattern and we will continue to monitor for worsening/progression of lower urinary tract symptoms

## 2025-07-21 ENCOUNTER — RESULTS FOLLOW-UP (OUTPATIENT)
Dept: UROLOGY | Facility: AMBULATORY SURGERY CENTER | Age: 75
End: 2025-07-21

## 2025-07-21 LAB — PSA SERPL-MCNC: 0.22 NG/ML

## 2025-07-21 NOTE — TELEPHONE ENCOUNTER
Pt calling in regards to order for catheter supplies.     Advised that clinical sent order to [facility] on 7/14. Pt understood but sts he is having issues with insurance company covering supplies. Pt is requesting a call back from clinical.    Pt cb- [phone]

## 2025-07-21 NOTE — TELEPHONE ENCOUNTER
Called Sang and notified him that his most recent PSA  t returned normal at a value of 0.22. Per AUA guidelines patient no longer needs PSA testing with no detectable prostate cancer by the age of 74. Per Blu

## 2025-07-22 NOTE — TELEPHONE ENCOUNTER
Called other number listed as it was a hair salon and [facility] home.  Other number given state number not in service.  Called about catheter supplies not sure what is needed he will have to call insurance company to find out what we can do

## 2025-07-25 NOTE — TELEPHONE ENCOUNTER
[employer] called because they received info from our office re: patient's catheter supplies. They wanted to verify the patient's number. They have a number of [phone]. I noticed we have the patient's number in the registration tab as [phone]. She will attempt to contact patient again.     No further action needed at this time.

## (undated) DEVICE — STANDARD SURGICAL GOWN, L: Brand: CONVERTORS

## (undated) DEVICE — SINGLE PORT MANIFOLD: Brand: NEPTUNE 2

## (undated) DEVICE — THE MONARCH® "D" CARTRIDGE IS A SINGLE-USE POLYPROPYLENE CARTRIDGE FOR POSTERIOR CHAMBER IOL DELIVERY: Brand: MONARCH® III

## (undated) DEVICE — AIR INJECT CANNULA 27GA: Brand: OPHTHALMIC CANNULA

## (undated) DEVICE — MICROSURGICAL INSTRUMENT IRR. CYSTITOME 25GA STRAIGHT-REVERSE CUTTING: Brand: ALCON

## (undated) DEVICE — GUIDEWIRE STRGHT TIP 0.035 IN  SOLO PLUS

## (undated) DEVICE — B-H IRRIGATING CAN 19GA FLAT ANGLED 8MM: Brand: OPHTHALMIC CANNULA

## (undated) DEVICE — UROCATCH BAG

## (undated) DEVICE — PREMIUM DRY TRAY LF: Brand: MEDLINE INDUSTRIES, INC.

## (undated) DEVICE — ACTIVE FMS W/ INTREPID* ULTRA SLEEVES, 0.9MM 45° ABS* INTREPID* BALANCED TIP: Brand: ALCON

## (undated) DEVICE — INTREPID® TRANSFORMER IA HP: Brand: INTREPID®

## (undated) DEVICE — CLEARCUT® SLIT KNIFE INTREPID MICRO-COAXIAL SYSTEM 2.4 SB: Brand: CLEARCUT®; INTREPID

## (undated) DEVICE — IV SET 15 DROP 3/Y GRAVITY CARESITE

## (undated) DEVICE — POV-IOD SOLUTION 4OZ BT

## (undated) DEVICE — LUBRICANT SURGILUBE TUBE 4 OZ  FLIP TOP

## (undated) DEVICE — INVIEW CLEAR LEGGINGS: Brand: CONVERTORS

## (undated) DEVICE — Device

## (undated) DEVICE — SCD SEQUENTIAL COMPRESSION COMFORT SLEEVE MEDIUM KNEE LENGTH: Brand: KENDALL SCD

## (undated) DEVICE — CENTURION VISION SYSTEM FMS

## (undated) DEVICE — 60 ML SYRINGE,TOOMEY TYPE: Brand: MONOJECT

## (undated) DEVICE — LASER FIBER: Brand: MOXY

## (undated) DEVICE — EYE PACK CUSTOM -FINNEGAN

## (undated) DEVICE — CATH FOLEY HEMATURIA 24FR 30ML 2 WAY LUBRICATH

## (undated) DEVICE — GLOVE SRG BIOGEL 7.5

## (undated) DEVICE — BAG URINE DRAINAGE 2000ML ANTI RFLX LF

## (undated) DEVICE — PACK TUR

## (undated) DEVICE — TUBING SUCTION 5MM X 12 FT

## (undated) DEVICE — HEYMAN DILATOR SET

## (undated) DEVICE — STERILE POLYISOPRENE POWDER-FREE SURGICAL GLOVES: Brand: PROTEXIS